# Patient Record
Sex: MALE | Race: WHITE | NOT HISPANIC OR LATINO | Employment: FULL TIME | URBAN - METROPOLITAN AREA
[De-identification: names, ages, dates, MRNs, and addresses within clinical notes are randomized per-mention and may not be internally consistent; named-entity substitution may affect disease eponyms.]

---

## 2017-05-04 ENCOUNTER — HISTORICAL (OUTPATIENT)
Dept: ADMINISTRATIVE | Facility: HOSPITAL | Age: 52
End: 2017-05-04

## 2019-05-28 ENCOUNTER — HISTORICAL (OUTPATIENT)
Dept: LAB | Facility: HOSPITAL | Age: 54
End: 2019-05-28

## 2019-11-13 ENCOUNTER — HISTORICAL (OUTPATIENT)
Dept: ADMINISTRATIVE | Facility: HOSPITAL | Age: 54
End: 2019-11-13

## 2019-11-13 LAB
ABS NEUT (OLG): 5.2 X10(3)/MCL (ref 2.1–9.2)
ALBUMIN SERPL-MCNC: 4.4 GM/DL (ref 3.4–5)
ALBUMIN/GLOB SERPL: 1.69 {RATIO} (ref 1.5–2.5)
ALP SERPL-CCNC: 43 UNIT/L (ref 38–126)
ALT SERPL-CCNC: 37 UNIT/L (ref 7–52)
APPEARANCE, UA: CLEAR
AST SERPL-CCNC: 36 UNIT/L (ref 15–37)
BACTERIA #/AREA URNS AUTO: NORMAL /HPF
BILIRUB SERPL-MCNC: 0.7 MG/DL (ref 0.2–1)
BILIRUB UR QL STRIP: NEGATIVE MG/DL
BILIRUBIN DIRECT+TOT PNL SERPL-MCNC: 0.1 MG/DL (ref 0–0.5)
BILIRUBIN DIRECT+TOT PNL SERPL-MCNC: 0.6 MG/DL
BUN SERPL-MCNC: 16 MG/DL (ref 7–18)
CALCIUM SERPL-MCNC: 9 MG/DL (ref 8.5–10)
CHLORIDE SERPL-SCNC: 99 MMOL/L (ref 98–107)
CHOLEST SERPL-MCNC: 232 MG/DL (ref 0–200)
CHOLEST/HDLC SERPL: 4.4 {RATIO}
CO2 SERPL-SCNC: 29 MMOL/L (ref 21–32)
COLOR UR: YELLOW
CREAT SERPL-MCNC: 1 MG/DL (ref 0.6–1.3)
ERYTHROCYTE [DISTWIDTH] IN BLOOD BY AUTOMATED COUNT: 12.1 % (ref 11.5–17)
GLOBULIN SER-MCNC: 2.6 GM/DL (ref 1.2–3)
GLUCOSE (UA): NEGATIVE MG/DL
GLUCOSE SERPL-MCNC: 109 MG/DL (ref 74–106)
HCT VFR BLD AUTO: 41.8 % (ref 42–52)
HDLC SERPL-MCNC: 53 MG/DL (ref 35–60)
HGB BLD-MCNC: 14.4 GM/DL (ref 14–18)
HGB UR QL STRIP: NEGATIVE UNIT/L
KETONES UR QL STRIP: NEGATIVE MG/DL
LDLC SERPL CALC-MCNC: 172 MG/DL (ref 0–129)
LEUKOCYTE ESTERASE UR QL STRIP: NEGATIVE UNIT/L
LYMPHOCYTES # BLD AUTO: 2.9 X10(3)/MCL (ref 0.6–3.4)
LYMPHOCYTES NFR BLD AUTO: 32.4 % (ref 13–40)
MCH RBC QN AUTO: 31.4 PG (ref 27–31.2)
MCHC RBC AUTO-ENTMCNC: 34 GM/DL (ref 32–36)
MCV RBC AUTO: 91 FL (ref 80–94)
MONOCYTES # BLD AUTO: 0.9 X10(3)/MCL (ref 0.1–1.3)
MONOCYTES NFR BLD AUTO: 9.5 % (ref 0.1–24)
NEUTROPHILS NFR BLD AUTO: 58.1 % (ref 47–80)
NITRITE UR QL STRIP.AUTO: NEGATIVE
PH UR STRIP: 5 [PH]
PLATELET # BLD AUTO: 273 X10(3)/MCL (ref 130–400)
PMV BLD AUTO: 8.7 FL (ref 9.4–12.4)
POTASSIUM SERPL-SCNC: 4 MMOL/L (ref 3.5–5.1)
PROT SERPL-MCNC: 7 GM/DL (ref 6.4–8.2)
PROT UR QL STRIP: NEGATIVE MG/DL
PSA SERPL-MCNC: 5.82 NG/ML (ref 0–3.5)
RBC # BLD AUTO: 4.58 X10(6)/MCL (ref 4.7–6.1)
RBC #/AREA URNS HPF: NORMAL /HPF
SODIUM SERPL-SCNC: 134 MMOL/L (ref 136–145)
SP GR UR STRIP: 1.02
SQUAMOUS EPITHELIAL, UA: NORMAL /LPF
TESTOST SERPL-MCNC: 311 NG/DL (ref 300–1060)
TRIGL SERPL-MCNC: 148 MG/DL (ref 30–150)
UROBILINOGEN UR STRIP-ACNC: 0.2 MG/DL
VLDLC SERPL CALC-MCNC: 29.6 MG/DL
WBC # SPEC AUTO: 9 X10(3)/MCL (ref 4.5–11.5)
WBC #/AREA URNS AUTO: NORMAL /[HPF]

## 2020-03-02 ENCOUNTER — HISTORICAL (OUTPATIENT)
Dept: ADMINISTRATIVE | Facility: HOSPITAL | Age: 55
End: 2020-03-02

## 2020-03-02 LAB
FLUAV AG NPH QL IA: POSITIVE
FLUBV AG NPH QL IA: NEGATIVE

## 2022-04-11 ENCOUNTER — HISTORICAL (OUTPATIENT)
Dept: ADMINISTRATIVE | Facility: HOSPITAL | Age: 57
End: 2022-04-11

## 2022-04-27 VITALS
DIASTOLIC BLOOD PRESSURE: 70 MMHG | HEIGHT: 74 IN | SYSTOLIC BLOOD PRESSURE: 120 MMHG | WEIGHT: 240.75 LBS | OXYGEN SATURATION: 95 % | BODY MASS INDEX: 30.9 KG/M2

## 2022-05-05 NOTE — HISTORICAL [facility]ER
This is a historical note converted from Sonia. Formatting and pictures may have been removed.  Please reference Sonia for original formatting and attached multimedia. Chief Complaint  Needs pcp  History of Present Illness  Patient presents to establish care in the internal medicine clinic.? He has a past medical history hypertension, tobacco use, and most recently,?left great toe amputation secondary to acute osteomyelitis?in March 2017.? Patients last surgery clinic appointment was April 11, 2017 at which time he was released to go back to work.? 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 However, he admits to a new?ulceration on his left?2nd toe now.? This 1st started about 2 weeks ago?and he was able to express a significant amount of?pus.? 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 He denies fever, chills, nausea, vomiting, headaches, diarrhea or abdominal pain. ?Has been using topical mupirocin?2% ointment.  Review of Systems  ????Constitutional: No fever, No chills, No weakness, No fatigue.  ?????Eye: No recent visual problem.  ?????Respiratory: No shortness of breath, No cough, No sputum production, No wheezing.  ?????Cardiovascular: No chest pain, No palpitations, No peripheral edema.  ?????Gastrointestinal: No nausea, No vomiting, No diarrhea, No constipation, No heartburn, No abdominal pain.  ?????Genitourinary: No dysuria.  ?????Endocrine: No excessive thirst, No polyuria, No cold intolerance, No heat intolerance.  ?????Musculoskeletal: No back pain, No joint pain, No decreased range of motion.  ?????Integumentary: No rash, No pruritus.? 2nd left toe ulcer.  ?????Neurologic: Alert and oriented X4, No numbness, No tingling, No headache.  ?????Psychiatric: No anxiety, No depression.  Physical Exam  Vitals & Measurements  T:?37.0? ?C ?(Oral)?  RR:?18? BP:?122/77? HT:?188?cm? HT:?188?cm? HT:?188?cm? WT:?94.0?kg? WT:?94.0?kg? WT:?94.0?kg? BMI:?26.6?  General: Alert and oriented, No acute distress.  ?????Eye: Pupils are equal, round and reactive to light, Extraocular movements are intact, Normal conjunctiva.  ?????HENT: Normocephalic, Oral mucosa is moist, No pharyngeal erythema.  ?????Neck: Supple, Non-tender.  ?????Respiratory: Lungs are clear to auscultation, Respirations are non-labored.  ?????Cardiovascular: Normal rate, Regular rhythm, No murmur, Good pulses equal in all extremities, No edema.  ?????Gastrointestinal: Soft, Non-tender, Non-distended, Normal bowel sounds.  ?????Musculoskeletal: Normal range of motion, Normal strength.  ?????Integumentary: Warm, Dry.? Well-healed left great toe amputation site.? 2nd?left?toe?reveals edema, erythema,?1 cm x 1 cm?ulceration?distally?with underlying?yellow?material, but no?discharge could be expressed?on manual?palpation.  ?????Neurologic: Alert, Oriented.  ?????Cognition and Speech: Oriented, Speech clear and coherent.  ?????Psychiatric: Cooperative, Appropriate mood & affect.  Assessment/Plan  Cellulitis of second toe of left foot  Ordered:  clindamycin, 300 mg = 1 cap(s), Oral, q6hr, X 10 day(s), # 40 cap(s), 0 Refill(s), Pharmacy: [facility] [place], [place]  XR Foot Second Digit Left Min 2 Views, Routine, *Est. 05/04/17 3:00:00 CDT, Inflammation, Left second toe cellulitis, Hx of L great toe osteomyelitis, None, Wheelchair, Patient Has IV?, Rad Type, Order for future visit, Cellulitis of second toe of left foot, On Exactly, *Est. 05/04/17 3:00...  ?  Orders:  lisinopril, 10 mg = 1 tab(s), Oral, Daily, # 30 tab(s), 3 Refill(s), [facility] Follow up, *Est. 06/29/17 3:00:00 CDT, Order for future visit, Wellness examination  Hypertension, [facility]  Lipid Panel, Routine collect, *Est. 06/22/17 3:00:00 CDT, Blood, Order for future visit, *Est. Stop date  06/22/17 3:00:00 CDT, Lab Collect, Wellness examination  Hypertension, 7, week(s), In Approximately, 05/04/17 8:14:00 CDT  Occult Blood Stool #2 Screening, Routine collect, *Est. 06/22/17 3:00:00 CDT, Stool, Order for future visit, *Est. Stop date 06/22/17 3:00:00 CDT, Nurse collect, Colon cancer screening, day(s), 7, week(s), In Approximately, 05/04/17 8:14:00 CDT  Occult Blood Stool #3 Screening, Routine collect, *Est. 06/22/17 3:00:00 CDT, Stool, Order for future visit, *Est. Stop date 06/22/17 3:00:00 CDT, Nurse collect, Colon cancer screening, day(s), 7, week(s), In Approximately, 05/04/17 8:14:00 CDT  Occult Blood Stool Screening Test, Routine collect, *Est. 06/22/17 3:00:00 CDT, Stool, Order for future visit, *Est. Stop date 06/22/17 3:00:00 CDT, Nurse collect, Colon cancer screening, day(s), 7, week(s), In Approximately, 05/04/17 8:14:00 CDT  ?  1. ?Hypertension: Well controlled on lisinopril?10 mg by mouth daily, no changes today.  2.? Recent history of?left great toe amputation:?Patient has been released to go back to work by surgeons.  3. ?Wellness: Labs as above.  4. ?Tobacco use: Strongly advised on cessation.  5. 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 X-ray today?to rule out?underlying osteomyelitis. ?Start oral antibiotic?as above,?clindamycin 300 mg by mouth 4 times a day??10 days.  ?  Screening:?FOBT ?3.  ?  Patient voiced understanding.   Problem List/Past Medical History  Essential hypertension  High blood pressure  Neuropathy of lower limb  Tobacco user  Tobacco user  Historical  No historical problems  Procedure/Surgical History  Amputation Toe (Left) (03/13/2017), Detachment at Left 1st Toe, Complete, Open Approach (03/13/2017), herniated disc repaired (01/01/2015), left femur break (01/01/2008), colostomy (1981), sciatic nerve repair (01/01/1980), surgery to left  leg (1980).  Medications  lisinopril 10 mg oral tablet, 10 mg, 1 tab(s), Oral, Daily, 3 refills  MELOXICAM TAB 7.5MG, 7.5 mg, 1 tab(s), Oral, BID  mupirocin 2% topical ointment, 1 adolfo, TOP, TID,? ?Not Taking, Completed Rx  Allergies  No Known Allergies  Social History  Alcohol  Current, Beer, 1-2 times per week  Employment/School  Employed, Highest education level: Some college.  Home/Environment  Lives with Alone. Living situation: Home/Independent. Alcohol abuse in household: No. Substance abuse in household: No. Smoker in household: No. Feels unsafe at home: No. Safe place to go: Yes. Family/Friends available for support: Yes. Concern for family members at home: No. Major illness in household: No.  Nutrition/Health  Regular, Caffeine intake amount: Coffee. Wants to lose weight: No. Sleeping concerns: No. Feels highly stressed: No.  Substance Abuse  Never  Tobacco  Current some day smoker, Cigarettes, 1 per day. Started age 18.0 Years. Ready to change: Yes.  Family History  : Mother and Father.  Health Maintenance  Colonoscopy: UTD at age of 49, within normal limits per patient.      Adding Cipro 500mg PO BID x10 days. Will be seen in wound care clinic today at 2:30pm.

## 2022-05-05 NOTE — HISTORICAL [facility] CERNER
This is a historical note converted from Sonia. Formatting and pictures may have been removed.  Please reference Sonia for original formatting and attached multimedia. Chief Complaint  neuropathic foot wound  History of Present Illness  51M with LLE neuropathy from nerve injury as a child. Had L great toe infection that required amputation 2 months ago. Now has callus and ulcer of 2nd toe. No fevers. Seen in medicine clinic, given clinda rx and sent  Physical Exam  Vitals & Measurements  T:?36.6? ?C ?(Oral)? RR:?20? BP:?139/92? WT:?94.6?kg? WT:?94.0?kg? WT:?94.6?kg?  NAD  reg pulse  resp nonlabored  LLE with calf atrophy, decreased hair, 2+ DP/PT pulses, minimal sensation  1st toe stump well healed  2nd toe erythema with callus at tip, and toe enlargement  ?  Callus and nail removed  underlying ulcer with fibrinous exudate  1x1.4 cm with yellow base  Assessment/Plan  51M with neuropathy and 2nd L toe ulcer  - callus and nail removed  - pt to start abx today  - advised to wear off-loading shoe and daily foot checks  - wound care- silver alginate  - return to wound care clinic next week  ?  Tiffanie Shah, PGY3   Problem List/Past Medical History  Essential hypertension  High blood pressure  Neuropathy of lower limb  Tobacco user  Tobacco user  Tobacco user  Historical  No historical problems  Procedure/Surgical History  Amputation Toe (Left) (03/13/2017), Detachment at Left 1st Toe, Complete, Open Approach (03/13/2017), herniated disc repaired (01/01/2015), left femur break (01/01/2008), colostomy (1981), sciatic nerve repair (01/01/1980), surgery to left leg (01/01/1980).  Medications  Inpatient  No active inpatient medications  Home  Cipro 500 mg oral tablet, 500 mg, 1 tab(s), Oral, q12hr  clindamycin 300 mg oral capsule, 300 mg, 1 cap(s), Oral, q6hr  lisinopril 10 mg oral tablet, 10 mg, 1 tab(s), Oral, Daily, 3 refills  MELOXICAM TAB 7.5MG, 7.5 mg, 1 tab(s), Oral, BID  mupirocin 2% topical ointment, 1 adolfo,  TOP, TID,? ?Not Taking, Completed Rx  Allergies  No Known Allergies  Social History  Alcohol  Current, Beer, 1-2 times per week  Employment/School  Employed, Highest education level: Some college.  Home/Environment  Lives with Alone. Living situation: Home/Independent. Alcohol abuse in household: No. Substance abuse in household: No. Smoker in household: No. Feels unsafe at home: No. Safe place to go: Yes. Family/Friends available for support: Yes. Concern for family members at home: No. Major illness in household: No.  Nutrition/Health  Regular, Caffeine intake amount: Coffee. Wants to lose weight: No. Sleeping concerns: No. Feels highly stressed: No.  Substance Abuse  Never  Tobacco  Current some day smoker, Cigarettes, 1 per day. Started age 18.0 Years. Ready to change: Yes.  Family History  : Mother and Father.

## 2022-05-05 NOTE — HISTORICAL [facility] CERNER
This is a historical note converted from Sonia. Formatting and pictures may have been removed.  Please reference Sonia for original formatting and attached multimedia. Chief Complaint  + Flu; Fell @ home while getting out of the bathtub- left side rib pain.  History of Present Illness  Pt fell and hit his left side on the tub last Thursday. He is having?a good bit of pain, especially with coughing, sneezing and movement.  Review of Systems  He feels like he has the flu. + chills. + emesis x 4. + Body aches. + malaise. + rhinorrhea. + cough. He woke up  morning and felt terrible. He started Theraflu.  Physical Exam  Vitals & Measurements  T:?36.8? ?C (Oral)? HR:?120(Peripheral)? BP:?100/70?  HT:?185?cm? WT:?110?kg? BMI:?32.14?  Gen: well developed, well nourished malewho is appears to be in mild distress.  Head/Neck:? normocephalic, atraumatic, supple,-adenopathy,?-stiffness.?  Mouth/Oropharynx:?-normal dentition,?-tongue abnormalities,-mucosal lesions,? + erythema,-exudate,?-edema of posterior pharynx,-edema of tonsils,??  -?tonsillar exudate,-postnasal drainage,-cobblestoning.  Nares:?+erythema,-edema,-discharge: rhinorrhea.  CV:? regular without murmurs, rubs, gallops.?  Chest:?clearto auscultation bilaterally,?-wheezing,-crackles,-rhonchi.??It is uncomfortable to patient to take a deep breath.  Chest Wall: he has an area of significant?tenderness along his left?inferior lateral rib cage, ecchymosis noted. No palpable deformities.  ?  Assessment/Plan  1.?Contusion of rib on left side?S20.212A  He has a rib contusion on his left side secondary to fall.  X-rays pending.  Norco 7.5 m tablet every 6 hours as needed for pain.  May take Tramadol for less severe pain; refills sent.  Ordered:  Office/Outpatient Visit Level 4 Established [id number] PC, Contusion of rib on left side  Flu, [facility], 20 8:59:00 CST  ?  2.?Flu?J11.1  Tamiflu 50 mg twice daily x5 days.  Multisymptom relief  product.  Backslash fluids.  ?  Ordered:  Office/Outpatient Visit Level 4 Established [id number] PC, Contusion of rib on left side  Flu, [facility], 03/02/20 8:59:00 CST  ?  Orders:  acetaminophen-HYDROcodone, 1 tab(s), Oral, q6hr, # 20 tab(s), 0 Refill(s), [facility] [place], 185, cm, Height/Length Dosing, 03/02/20 8:02:00 CST, 110, kg, Weight Dosing, 03/02/20 8:02:00 CST  oseltamivir, 75 mg = 1 cap(s), Oral, BID, X 5 day(s), # 10 cap(s), 0 Refill(s), [facility] [place], 185, cm, Height/Length Dosing, 03/02/20 8:02:00 CST, 110, kg, Weight Dosing, 03/02/20 8:02:00 CST  traMADol, See Instructions, TAKE ONE TABLET TWICE A DAY, # 60 tab(s), 1 Refill(s), [facility] [place], 185, cm, Height/Length Dosing, 03/02/20 8:02:00 CST, 110, kg, Weight Dosing, 03/02/20 8:02:00 CST  Clinic Follow-up PRN, 03/02/20 8:59:00 CST, [facility], Future Order  XR Ribs Left W PA Chest, Routine, 03/02/20 8:10:00 CST, Follow Up Trauma, None, Ambulatory, Patient Has IV?, Rad Type, Rib pain, [facility], 03/02/20 8:10:00 CST  Referrals  Clinic Follow-up PRN, 03/02/20 8:59:00 CST, [facility], Future Order   Problem List/Past Medical History  Ongoing  Essential hypertension  Neuropathy of lower limb  Obesity  Historical  High blood pressure  Tobacco user  Procedure/Surgical History  Amputation Toe (Left) (03/13/2017)  Detachment at Left 1st Toe, Complete, Open Approach (03/13/2017)  herniated disc repaired (01/01/2015)  left femur break (01/01/2008)  colostomy (1981)  sciatic nerve repair (01/01/1980)  surgery to left leg (01/01/1980)   Medications  lisinopril 10 mg oral tablet, 10 mg= 1 tab(s), Oral, Daily, 5 refills  Norco 7.5 mg-325 mg oral tablet, 1 tab(s), Oral, q6hr  sildenafil 20 mg oral tablet, See Instructions  Tamiflu 75 mg oral capsule, 75 mg= 1 cap(s), Oral, BID  traMADol 50 mg oral tablet, See Instructions, 1 refills  Allergies  No Known  Allergies  Social History  Abuse/Neglect  No, 2020  Alcohol  Current, Beer, 1-2 times per week, 2017  Employment/School  Employed, Work/School description: [employer]., 2019  Employed, Highest education level: Some college., 2017  Exercise  Exercise frequency: 3-4 times/week. Exercise type: Swimming, Weight lifting, BIKE RIDING., 2019  Home/Environment  Lives with Alone. Living situation: Home/Independent. Alcohol abuse in household: No. Substance abuse in household: No. Smoker in household: No. Feels unsafe at home: No. Safe place to go: Yes. Family/Friends available for support: Yes. Concern for family members at home: No. Major illness in household: No., 2017  Nutrition/Health  Regular, Good, 2019  Substance Use  Never, 2017  Tobacco  4 or less cigarettes(less than 1/4 pack)/day in last 30 days, Cigarettes, No, 1 per day. 18 Years (Age started)., 2020  Current some day smoker, Cigarettes, 1 per day. Started age 18.0 Years. Ready to change: Yes., 2017  Family History  : Mother and Father.  Immunizations  Vaccine Date Status   tetanus/diphtheria/pertussis, acel(Tdap) 2019 Given   tetanus/diphtheria/pertussis, acel(Tdap) 2019 Given   influenza virus vaccine, inactivated 2015 Recorded   Health Maintenance  Health Maintenance  ???Pending?(in the next year)  ??? ??OverDue  ??? ? ? ?Diabetes Screening due??and every?  ??? ? ? ?ADL Screening due??18??and every 1??year(s)  ??? ? ? ?Depression Screening due??08/10/18??and every 1??year(s)  ??? ? ? ?Alcohol Misuse Screening due??20??and every 1??year(s)  ??? ??Due?  ??? ? ? ?Aspirin Therapy for CVD Prevention due??20??and every 1??year(s)  ??? ? ? ?Colorectal Screening due??20??and every?  ??? ? ? ?Hypertension Management-Education due??20??and every 1??year(s)  ??? ??Due In Future?  ??? ? ? ?Hypertension Management-BMP not due  until??11/12/20??and every 1??year(s)  ??? ? ? ?Obesity Screening not due until??01/01/21??and every 1??year(s)  ???Satisfied?(in the past 1 year)  ??? ??Satisfied?  ??? ? ? ?Blood Pressure Screening on??03/02/20.??Satisfied by Aida Jasmine LPN  ??? ? ? ?Body Mass Index Check on??03/02/20.??Satisfied by Aida Jasmine LPN  ??? ? ? ?Diabetes Screening on??11/13/19.??Satisfied by Jarad Menchaca  ??? ? ? ?Hypertension Management-Blood Pressure on??03/02/20.??Satisfied by Aida Jasmine LPN  ??? ? ? ?Lipid Screening on??11/13/19.??Satisfied by Jarad Menchaca  ??? ? ? ?Obesity Screening on??03/02/20.??Satisfied by Aida Jasmine LPN  ??? ? ? ?Tetanus Vaccine on??11/13/19.??Satisfied by Aida Jasmine LPN  ?  Lab Results  Test Name Test Result Date/Time   Influ A Ag Positive (Abnormal) 03/02/2020 08:34 CST   Influ B Ag Negative 03/02/2020 08:34 CST       X-rays?reveal nondisplaced?fractures?of the tips of the left eighth and ninth ribs.

## 2022-07-15 PROBLEM — M25.552 BILATERAL HIP PAIN: Status: ACTIVE | Noted: 2022-07-15

## 2022-07-15 PROBLEM — G89.29 CHRONIC MIDLINE LOW BACK PAIN WITHOUT SCIATICA: Status: ACTIVE | Noted: 2022-07-15

## 2022-07-15 PROBLEM — M54.50 CHRONIC MIDLINE LOW BACK PAIN WITHOUT SCIATICA: Status: ACTIVE | Noted: 2022-07-15

## 2022-07-15 PROBLEM — M25.551 BILATERAL HIP PAIN: Status: ACTIVE | Noted: 2022-07-15

## 2022-11-03 PROBLEM — Z00.00 ENCOUNTER FOR WELLNESS EXAMINATION IN ADULT: Status: ACTIVE | Noted: 2022-11-03

## 2022-11-03 PROBLEM — Z23 IMMUNIZATION DUE: Status: ACTIVE | Noted: 2022-11-03

## 2022-11-03 PROCEDURE — 84100 ASSAY OF PHOSPHORUS: CPT | Performed by: FAMILY MEDICINE

## 2022-11-03 PROCEDURE — 85651 RBC SED RATE NONAUTOMATED: CPT | Performed by: FAMILY MEDICINE

## 2022-11-03 PROCEDURE — 86141 C-REACTIVE PROTEIN HS: CPT | Performed by: FAMILY MEDICINE

## 2023-01-08 PROBLEM — Z01.818 PRE-OP EXAMINATION: Status: ACTIVE | Noted: 2023-01-08

## 2023-01-09 PROBLEM — M16.0 PRIMARY OSTEOARTHRITIS OF BOTH HIPS: Status: ACTIVE | Noted: 2023-01-09

## 2023-01-09 PROBLEM — R73.9 HYPERGLYCEMIA: Status: ACTIVE | Noted: 2023-01-09

## 2023-02-06 PROBLEM — Z00.00 ENCOUNTER FOR WELLNESS EXAMINATION IN ADULT: Status: RESOLVED | Noted: 2022-11-03 | Resolved: 2023-02-06

## 2023-02-12 PROBLEM — Z09 HOSPITAL DISCHARGE FOLLOW-UP: Status: ACTIVE | Noted: 2023-02-12

## 2023-02-14 PROBLEM — Z96.642 HISTORY OF TOTAL HIP REPLACEMENT, LEFT: Status: ACTIVE | Noted: 2023-02-14

## 2023-04-10 PROBLEM — E83.52 HYPERCALCEMIA: Status: ACTIVE | Noted: 2023-04-10

## 2023-04-10 PROBLEM — R60.0 EDEMA OF LEFT LOWER EXTREMITY: Status: ACTIVE | Noted: 2023-04-10

## 2023-05-22 PROBLEM — Z09 HOSPITAL DISCHARGE FOLLOW-UP: Status: RESOLVED | Noted: 2023-02-12 | Resolved: 2023-05-22

## 2023-10-03 PROBLEM — M16.11 PRIMARY OSTEOARTHRITIS OF RIGHT HIP: Status: ACTIVE | Noted: 2023-10-03

## 2024-03-04 ENCOUNTER — HOSPITAL ENCOUNTER (EMERGENCY)
Facility: HOSPITAL | Age: 59
Discharge: HOME | End: 2024-03-04
Attending: INTERNAL MEDICINE
Payer: COMMERCIAL

## 2024-03-04 VITALS
BODY MASS INDEX: 33.13 KG/M2 | HEART RATE: 88 BPM | OXYGEN SATURATION: 99 % | WEIGHT: 250 LBS | DIASTOLIC BLOOD PRESSURE: 97 MMHG | RESPIRATION RATE: 20 BRPM | SYSTOLIC BLOOD PRESSURE: 151 MMHG | TEMPERATURE: 97 F | HEIGHT: 73 IN

## 2024-03-04 DIAGNOSIS — J06.9 VIRAL URI WITH COUGH: Primary | ICD-10-CM

## 2024-03-04 DIAGNOSIS — R03.0 ELEVATED BLOOD PRESSURE READING: ICD-10-CM

## 2024-03-04 LAB
FLUAV AG UPPER RESP QL IA.RAPID: NOT DETECTED
FLUBV AG UPPER RESP QL IA.RAPID: NOT DETECTED
SARS-COV-2 RNA RESP QL NAA+PROBE: NOT DETECTED

## 2024-03-04 PROCEDURE — 99284 EMERGENCY DEPT VISIT MOD MDM: CPT | Mod: 25

## 2024-03-04 PROCEDURE — 0240U COVID/FLU A&B PCR: CPT | Performed by: INTERNAL MEDICINE

## 2024-03-04 PROCEDURE — 93005 ELECTROCARDIOGRAM TRACING: CPT

## 2024-03-04 NOTE — ED PROVIDER NOTES
Source of History:  Patient, no limitations    Chief complaint:  Cough (Pt complaint of cough and sinus congestion)      HPI:  Kamaljit Frias is a 58 y.o. male presenting with Cough (Pt complaint of cough and sinus congestion)       Only wants COVID testing, refusing treatment  Patient presents for evaluation of congestion, sore throat. Onset of symptoms was 1 week ago, and has been gradually improving since that time. Symptoms include congestion. The symptoms are worse with change in weather and cold symptoms. The patient denies complaints of fever. The patient has a past medical history of No pertinent additional PMH. Fluid intake has been good. Care prior to arrival consisted of  None      Review of Systems   Constitutional symptoms:  Negative except as documented in HPI.   Skin symptoms:  Negative except as documented in HPI.   HEENT symptoms:  Negative except as documented in HPI.   Respiratory symptoms:  Negative except as documented in HPI.   Cardiovascular symptoms:  Negative except as documented in HPI.   Gastrointestinal symptoms:  Negative except as documented in HPI.    Genitourinary symptoms:  Negative except as documented in HPI.   Musculoskeletal symptoms:  Negative except as documented in HPI.   Neurologic symptoms:  Negative except as documented in HPI.   Psychiatric symptoms:  Negative except as documented in HPI.   Allergy/immunologic symptoms:  Negative except as documented in HPI.             Additional review of systems information: All other systems reviewed and otherwise negative.      Review of patient's allergies indicates:  No Known Allergies    PMH:  As per HPI and below:    Past Medical History:   Diagnosis Date    Essential (primary) hypertension     Neuropathy of lower extremity         Family History   Problem Relation Age of Onset    No Known Problems Mother     Stroke Father     Fibromyalgia Sister     No Known Problems Sister        Past Surgical History:   Procedure  "Laterality Date    COLOSTOMY  1981    Detachment at Left 1st Toe, Complete, Open Approach Left 03/13/2017    herniated disc repaired   2015    left femur break   2008    left hip replacement  02/02/2023    sciatic nerve repair  Left 1980       Social History     Tobacco Use    Smoking status: Former    Smokeless tobacco: Never    Tobacco comments:     Age started: 18; Age stopped: 54   Substance Use Topics    Alcohol use: Yes     Comment: 1-2 times per week    Drug use: Never       Patient Active Problem List   Diagnosis    Essential (primary) hypertension    Chronic midline low back pain without sciatica    Neuropathy of lower extremity    Immunization due    Pre-op examination    Primary osteoarthritis of both hips    Hyperglycemia    History of total hip replacement, left    Edema of left lower extremity    Hypercalcemia    Primary osteoarthritis of right hip        Physical Exam:    BP (!) 151/97   Pulse 88   Temp 97 °F (36.1 °C) (Oral)   Resp 20   Ht 6' 1" (1.854 m)   Wt 113.4 kg (250 lb)   SpO2 99%   BMI 32.98 kg/m²     Nursing note and vital signs reviewed.    General:  Alert, no acute distress.   Skin: Normal for Ethnic Origin, No cyanosis  HEENT: Normocephalic and atraumatic, Vision unchanged, Pupils symmetric, No icterus , Nasal mucosa is pink and moist  Cardiovascular:  Regular rate and rhythm, No edema  Chest Wall: No deformity, equal chest rise  Respiratory:  Lungs are clear to auscultation, respirations are non-labored.    Musculoskeletal:  No deformity, Normal perfusion to all extremities  Gastrointestinal:  Soft, Non distended  Neurological:  Alert and oriented, normal motor observed, normal speech observed.    Psychiatric:  Cooperative, appropriate mood & affect.        Labs that have been ordered have been independently reviewed and interpreted by myself.     Old Chart Reviewed.      Initial Impression/ Differential Dx:  Viral upper respiratory infection, sinusitis, allergic rhinitis, " bronchitis, influenza, pneumonia, dental infection, pharyngitis       MDM:      Reviewed Nurses Note.    Reviewed Pertinent old records.    Orders Placed This Encounter    X-Ray Chest PA And Lateral    COVID/FLU A&B PCR    EKG 12-lead                    Labs Reviewed   COVID/FLU A&B PCR - Normal    Narrative:     The Xpert Xpress SARS-CoV-2/FLU/RSV plus is a rapid, multiplexed real-time PCR test intended for the simultaneous qualitative detection and differentiation of SARS-CoV-2, Influenza A, Influenza B, and respiratory syncytial virus (RSV) viral RNA in either nasopharyngeal swab or nasal swab specimens.                X-Ray Chest PA And Lateral   Final Result      No acute chest disease is identified.         Electronically signed by: Eleazar Davis   Date:    03/04/2024   Time:    08:56           Admission on 03/04/2024, Discharged on 03/04/2024   Component Date Value Ref Range Status    Influenza A PCR 03/04/2024 Not Detected  Not Detected Final    Influenza B PCR 03/04/2024 Not Detected  Not Detected Final    SARS-CoV-2 PCR 03/04/2024 Not Detected  Not Detected, Negative Final       Imaging Results              X-Ray Chest PA And Lateral (Final result)  Result time 03/04/24 08:56:25      Final result by Eleazar Davis MD (03/04/24 08:56:25)                   Impression:      No acute chest disease is identified.      Electronically signed by: Eleazar Davis  Date:    03/04/2024  Time:    08:56               Narrative:    EXAMINATION:  XR CHEST PA AND LATERAL    CLINICAL HISTORY:  cough;, .    COMPARISON:  March 2, 2020    FINDINGS:  No alveolar consolidation, effusion, or pneumothorax is seen.   The thoracic aorta is normal  cardiac silhouette, central pulmonary vessels and mediastinum are normal in size and are grossly unremarkable.   visualized osseous structures are grossly unremarkable.                                        ECG Results              EKG 12-lead (Preliminary result)  Result time  03/04/24 08:50:06      Wet Read by Pedro Ayala DO (03/04/24 08:50:06, [facility], Emergency Medicine)    Independent ECG Interpretation:    Normal sinus rhythm at rate of 90. Normal intervals. Normal QRS. No acute ST or T wave abnormalities. Overall impression: No evidence of acute ischemia or arrhythmia.                                                                      Diagnostic Impression:    1. Viral URI with cough    2. Elevated blood pressure reading         ED Disposition Condition    Discharge Stable             Follow-up Information       [facility]    Specialty: Emergency Medicine  Why: If symptoms worsen  Contact information:  [address]  [facility] [id number] [phone]                            ED Prescriptions    None       Follow-up Information       Follow up With Specialties Details Why Contact Info    [facility] Emergency Medicine  If symptoms worsen [address]  [facility] [id number] [phone]             Pedro Ayala DO  03/04/24 1301

## 2024-03-05 LAB
OHS QRS DURATION: 86 MS
OHS QTC CALCULATION: 430 MS

## 2024-04-04 ENCOUNTER — HOSPITAL ENCOUNTER (EMERGENCY)
Facility: HOSPITAL | Age: 59
Discharge: HOME | End: 2024-04-04
Attending: EMERGENCY MEDICINE
Payer: COMMERCIAL

## 2024-04-04 VITALS
HEART RATE: 104 BPM | SYSTOLIC BLOOD PRESSURE: 140 MMHG | WEIGHT: 250 LBS | RESPIRATION RATE: 20 BRPM | DIASTOLIC BLOOD PRESSURE: 99 MMHG | HEIGHT: 73 IN | TEMPERATURE: 99 F | OXYGEN SATURATION: 96 % | BODY MASS INDEX: 33.13 KG/M2

## 2024-04-04 DIAGNOSIS — M79.672 FOOT PAIN, LEFT: Primary | ICD-10-CM

## 2024-04-04 PROCEDURE — 99282 EMERGENCY DEPT VISIT SF MDM: CPT

## 2024-04-04 PROCEDURE — 25000003 PHARM REV CODE 250: Performed by: EMERGENCY MEDICINE

## 2024-04-04 RX ADMIN — BACITRACIN ZINC, NEOMYCIN SULFATE, AND POLYMYXIN B SULFATE: 400; 3.5; 5 OINTMENT TOPICAL at 02:04

## 2024-04-04 NOTE — ED PROVIDER NOTES
Encounter Date: 4/4/2024       History     Chief Complaint   Patient presents with    Foot Pain     Reports of left foot pain/wound that he first noticed about 3-4 days ago. Patient states when he was taking a shower he thinks it was a blister that popped and he noticed blood. Denies hx of diabetes. Pt states he is living in his car and his feet are always hanging downward so he thinks it may be from the pressure placed on the foot.      58-year-old male comes to emergency room stating that he has been taking a shower at [facility] because he is living in his car.  He states he is on diuretics for lower extremity edema, and when he stepped on a towel at [facility] he felt some pain in the noticed some skin that it come off of the heel of his foot.  He states it hurts to put pressure on this area.  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      Review of patient's allergies indicates:  No Known Allergies  Past Medical History:   Diagnosis Date    Essential (primary) hypertension     Neuropathy of lower extremity      Past Surgical History:   Procedure Laterality Date    COLOSTOMY  1981    Detachment at Left 1st Toe, Complete, Open Approach Left 03/13/2017    herniated disc repaired   2015    left femur break   2008    left hip replacement  02/02/2023    sciatic nerve repair  Left 1980     Family History   Problem Relation Age of Onset    No Known Problems Mother     Stroke Father     Fibromyalgia Sister     No Known Problems Sister      Social History     Tobacco Use    Smoking status: Former    Smokeless tobacco: Never    Tobacco comments:     Age started: 18; Age stopped: 54   Substance Use Topics    Alcohol use: Yes     Comment: 1-2 times per week    Drug use: Never     Review of Systems   All other systems reviewed and are negative.      Physical Exam     Initial Vitals [04/04/24 1238]   BP Pulse Resp Temp SpO2   (!)  140/99 104 20 98.8 °F (37.1 °C) 96 %      MAP       --         Physical Exam    Nursing note and vitals reviewed.  Constitutional: He appears well-developed.   HENT:   Head: Normocephalic.   Eyes: Conjunctivae are normal.   Cardiovascular:  Normal rate, regular rhythm and normal heart sounds.           Pulmonary/Chest: Breath sounds normal.   Abdominal: Abdomen is soft. Bowel sounds are normal. He exhibits no distension. There is no abdominal tenderness.   Musculoskeletal:         General: No edema.      Comments: The heel of the left foot has a 7 cm area consistent with a denuded blister.  The underlying tissue shows no signs of infection.  There is mild tenderness to this area.  There is no ecchymosis, and he is neurovascularly intact     Lymphadenopathy:     He has no cervical adenopathy.   Neurological: He is alert.   Skin: Skin is warm.   Psychiatric: He has a normal mood and affect.         ED Course   Procedures  Labs Reviewed - No data to display       Imaging Results    None          Medications - No data to display  Medical Decision Making  Medical Decision Making  Problem list/ differential diagnosis including but not limited to:  Infection, ischemia, trauma     Patient's chronic illnesses impacting care:  chronic edema        Diagnostic test considered but not ordered: none     Radiology reports  na     Discussion of case with external qualified healthcare professionals:  Not applicable        Review of external notes( inpt, ems, NH, clinic): in epic        Decision rules/scores:     Medications reviewed: all  Medications ordered in the ER: neosporin  Discharge prescriptions: none     Social variables possible impacting patient's healthcare: none     Code status/discussion     Shared decision making:     Consideration for admission versus discharge: stable for discharge      Amount and/or Complexity of Data Reviewed  Meds, old records           Amount and/or Complexity of Data Reviewed  External Data  Reviewed: notes.                                      Clinical Impression:  Final diagnoses:  [M79.672] Foot pain, left - secondary to ruptured blister (Primary)          ED Disposition Condition    Discharge Stable          ED Prescriptions    None       Follow-up Information       Follow up With Specialties Details Why Contact Info    Jian Walter MD Family Medicine In 2 days  [address]  [facility] [address]  [phone]               Paresh Blackwell Jr., MD  04/04/24 8585

## 2024-04-07 NOTE — PROGRESS NOTES
"left foot blister and Edema (X 1 week)       HPI:    Pt has been living in his car for a month. He lost his roommate and he has been working much. His left foot has been swollen x 3 weeks. His foot has gotten red starting 2 days ago. He has had drainage x 1 week from his left heel after blister develop.  He went to the ER at [facility] 1 week ago and had foot cleaned up.  He was given bandages to dress his heel.  No medications were prescribed; no evidence of infection at that time.  Patient has been able to shower once a week.  Patient with history of neuropathy; he has significantly decreased sensation of this foot.  He has a history of left great toe amputation.      Current Outpatient Medications   Medication Instructions    lisinopriL (PRINIVIL,ZESTRIL) 20 mg, Oral    tadalafiL (CIALIS) 20 mg, Oral, Daily         ROS:    See HPI.      PE:    ..Visit Vitals  /74   Pulse 95   Temp 99.1 °F (37.3 °C)   Ht 6' 1" (1.854 m)   Wt 123.8 kg (273 lb)   SpO2 (!) 93%   BMI 36.02 kg/m²        General:  He is well-developed well-nourished obese white male in no apparent distress.  He is alert and oriented.  He interacts appropriately.    Left lower extremity:  Patient has difficulty with getting shoe off secondary to swelling.  He has a sock which is wet with drainage.  Foot is diffusely swollen with cellulitis noted.  There is a large denuded area over his heel.  There is adjacent maceration of margins noted there is foul-smelling drainage.        1. Ulcer of left foot, unspecified ulcer stage  Overview:  Patient with denuded area to left heel; margins with maceration and adjacent cellulitis.  There is foul-smelling drainage noted.  He has cellulitis extending from foot to distal calf region.  Will send to ER to be admitted.      2. Cellulitis of left lower extremity  Overview:  Patient with erythema, pain and swelling to left lower extremity from foot to distal calf.  Patient has had foot swelling for weeks " secondary to living in car.  He subsequently developed a blister on his heel which ruptured.  He now has a denuded area over his left heel adjacent macerated skin and cellulitis.  There is foul-smelling drainage dressing.  Will send patient to hospital to be admitted for further evaluation and wound management.  Patient will need IV antibiotics and wound care.      3. Edema of left lower extremity  Overview:  Patient advised to take Lasix as needed or every other day to control edema.    07/10/2023:  He has not had any issues with lower extremity edema; he does not recall the last time he took Lasix.    04/10/2024: Patient with marked edema to left lower extremity for week secondary to living in automobile.  Patient has 3+ pitting edema from his foot to his proximal shin on examination.                ..Follow up if symptoms worsen or fail to improve.       No future appointments.

## 2024-04-10 ENCOUNTER — OFFICE VISIT (OUTPATIENT)
Dept: PRIMARY CARE CLINIC | Facility: CLINIC | Age: 59
End: 2024-04-10
Payer: COMMERCIAL

## 2024-04-10 ENCOUNTER — HOSPITAL ENCOUNTER (INPATIENT)
Facility: HOSPITAL | Age: 59
LOS: 4 days | Discharge: HOME | DRG: 603 | End: 2024-04-15
Attending: EMERGENCY MEDICINE | Admitting: INTERNAL MEDICINE
Payer: COMMERCIAL

## 2024-04-10 VITALS
BODY MASS INDEX: 36.18 KG/M2 | HEART RATE: 95 BPM | DIASTOLIC BLOOD PRESSURE: 74 MMHG | HEIGHT: 73 IN | WEIGHT: 273 LBS | TEMPERATURE: 99 F | OXYGEN SATURATION: 93 % | SYSTOLIC BLOOD PRESSURE: 132 MMHG

## 2024-04-10 DIAGNOSIS — R60.0 EDEMA OF LEFT LOWER EXTREMITY: ICD-10-CM

## 2024-04-10 DIAGNOSIS — T14.8XXA WOUND INFECTION: ICD-10-CM

## 2024-04-10 DIAGNOSIS — L97.529 ULCER OF LEFT FOOT, UNSPECIFIED ULCER STAGE: ICD-10-CM

## 2024-04-10 DIAGNOSIS — L98.499 INFECTED ULCER OF SKIN, UNSPECIFIED ULCER STAGE: ICD-10-CM

## 2024-04-10 DIAGNOSIS — L97.529 ULCER OF LEFT FOOT, UNSPECIFIED ULCER STAGE: Primary | ICD-10-CM

## 2024-04-10 DIAGNOSIS — L03.818 CELLULITIS OF OTHER SPECIFIED SITE: Primary | ICD-10-CM

## 2024-04-10 DIAGNOSIS — L08.9 WOUND INFECTION: ICD-10-CM

## 2024-04-10 DIAGNOSIS — L08.9 INFECTED ULCER OF SKIN, UNSPECIFIED ULCER STAGE: ICD-10-CM

## 2024-04-10 DIAGNOSIS — L03.90 CELLULITIS, UNSPECIFIED CELLULITIS SITE: ICD-10-CM

## 2024-04-10 DIAGNOSIS — M79.89 LEFT LEG SWELLING: ICD-10-CM

## 2024-04-10 DIAGNOSIS — L03.116 CELLULITIS OF LEFT LOWER EXTREMITY: ICD-10-CM

## 2024-04-10 LAB
ALBUMIN SERPL-MCNC: 3.5 G/DL (ref 3.4–4.8)
ALBUMIN/GLOB SERPL: 0.8 RATIO (ref 1.1–2)
ALP SERPL-CCNC: 62 UNIT/L (ref 40–150)
ALT SERPL-CCNC: 32 UNIT/L (ref 0–55)
AST SERPL-CCNC: 27 UNIT/L (ref 5–34)
BASOPHILS # BLD AUTO: 0.07 X10(3)/MCL
BASOPHILS NFR BLD AUTO: 0.6 %
BILIRUB SERPL-MCNC: 1 MG/DL
BUN SERPL-MCNC: 12.7 MG/DL (ref 8.4–25.7)
CALCIUM SERPL-MCNC: 9.4 MG/DL (ref 8.8–10)
CHLORIDE SERPL-SCNC: 101 MMOL/L (ref 98–107)
CO2 SERPL-SCNC: 23 MMOL/L (ref 23–31)
CREAT SERPL-MCNC: 1.07 MG/DL (ref 0.73–1.18)
EOSINOPHIL # BLD AUTO: 0.29 X10(3)/MCL (ref 0–0.9)
EOSINOPHIL NFR BLD AUTO: 2.7 %
ERYTHROCYTE [DISTWIDTH] IN BLOOD BY AUTOMATED COUNT: 13 % (ref 11.5–17)
GFR SERPLBLD CREATININE-BSD FMLA CKD-EPI: >60 MLS/MIN/1.73/M2
GLOBULIN SER-MCNC: 4.4 GM/DL (ref 2.4–3.5)
GLUCOSE SERPL-MCNC: 112 MG/DL (ref 82–115)
HCT VFR BLD AUTO: 43.4 % (ref 42–52)
HGB BLD-MCNC: 14.7 G/DL (ref 14–18)
IMM GRANULOCYTES # BLD AUTO: 0.07 X10(3)/MCL (ref 0–0.04)
IMM GRANULOCYTES NFR BLD AUTO: 0.6 %
LACTATE SERPL-SCNC: 1.4 MMOL/L (ref 0.5–2.2)
LYMPHOCYTES # BLD AUTO: 2.13 X10(3)/MCL (ref 0.6–4.6)
LYMPHOCYTES NFR BLD AUTO: 19.7 %
MCH RBC QN AUTO: 33 PG (ref 27–31)
MCHC RBC AUTO-ENTMCNC: 33.9 G/DL (ref 33–36)
MCV RBC AUTO: 97.3 FL (ref 80–94)
MONOCYTES # BLD AUTO: 1.07 X10(3)/MCL (ref 0.1–1.3)
MONOCYTES NFR BLD AUTO: 9.9 %
NEUTROPHILS # BLD AUTO: 7.18 X10(3)/MCL (ref 2.1–9.2)
NEUTROPHILS NFR BLD AUTO: 66.5 %
NRBC BLD AUTO-RTO: 0 %
PLATELET # BLD AUTO: 297 X10(3)/MCL (ref 130–400)
PMV BLD AUTO: 8.8 FL (ref 7.4–10.4)
POTASSIUM SERPL-SCNC: 4 MMOL/L (ref 3.5–5.1)
PROT SERPL-MCNC: 7.9 GM/DL (ref 5.8–7.6)
RBC # BLD AUTO: 4.46 X10(6)/MCL (ref 4.7–6.1)
SODIUM SERPL-SCNC: 136 MMOL/L (ref 136–145)
WBC # SPEC AUTO: 10.81 X10(3)/MCL (ref 4.5–11.5)

## 2024-04-10 PROCEDURE — 96374 THER/PROPH/DIAG INJ IV PUSH: CPT

## 2024-04-10 PROCEDURE — 83605 ASSAY OF LACTIC ACID: CPT | Performed by: PHYSICIAN ASSISTANT

## 2024-04-10 PROCEDURE — 25000003 PHARM REV CODE 250: Performed by: PHYSICIAN ASSISTANT

## 2024-04-10 PROCEDURE — 85025 COMPLETE CBC W/AUTO DIFF WBC: CPT | Performed by: PHYSICIAN ASSISTANT

## 2024-04-10 PROCEDURE — 99285 EMERGENCY DEPT VISIT HI MDM: CPT | Mod: 25

## 2024-04-10 PROCEDURE — 80053 COMPREHEN METABOLIC PANEL: CPT | Performed by: PHYSICIAN ASSISTANT

## 2024-04-10 PROCEDURE — 99214 OFFICE O/P EST MOD 30 MIN: CPT | Mod: ,,, | Performed by: FAMILY MEDICINE

## 2024-04-10 PROCEDURE — 87040 BLOOD CULTURE FOR BACTERIA: CPT | Performed by: PHYSICIAN ASSISTANT

## 2024-04-10 PROCEDURE — 96375 TX/PRO/DX INJ NEW DRUG ADDON: CPT

## 2024-04-10 RX ORDER — ACETAMINOPHEN 500 MG
1000 TABLET ORAL
Status: COMPLETED | OUTPATIENT
Start: 2024-04-10 | End: 2024-04-10

## 2024-04-10 RX ADMIN — ACETAMINOPHEN 1000 MG: 500 TABLET ORAL at 05:04

## 2024-04-10 NOTE — FIRST PROVIDER EVALUATION
"Medical screening examination initiated.  I have conducted a focused provider triage encounter, findings are as follows:    Chief Complaint   Patient presents with    Leg Swelling     C/o RLE leg swelling x3 weeks. Swelling, redness and shine noted to LLE. Hx of neuropathy. Denies SOB, CP, & N/V/D. Dopper DP pulse of 119.      Brief history of present illness:  58 y.o. male presents to the ED with LLE leg swelling for the last 3 weeks with worsening wound to the left heel. Hx of neuropathy due to previous injury, no hx of diabetes. Recently went to [facility] where they debrided wound. States he saw PCP today and told to come in     Vitals:    04/10/24 1727 04/10/24 1730   BP: (!) 167/87    Pulse: (!) 120    Resp: (!) 22    Temp: 100.1 °F (37.8 °C) 100.1 °F (37.8 °C)   TempSrc: Oral    SpO2: 95%    Weight: 113.4 kg (250 lb)    Height: 6' 1" (1.854 m)      Pertinent physical exam:  Awake, alert, ambulatory with limp, non-labored respirations, wound to heel with foul odor noted     Brief workup plan:  labs, XR, meds    Preliminary workup initiated; this workup will be continued and followed by the physician or advanced practice provider that is assigned to the patient when roomed.  "

## 2024-04-11 LAB
ALBUMIN SERPL-MCNC: 3 G/DL (ref 3.4–4.8)
ALBUMIN/GLOB SERPL: 1 RATIO (ref 1.1–2)
ALP SERPL-CCNC: 51 UNIT/L (ref 40–150)
ALT SERPL-CCNC: 26 UNIT/L (ref 0–55)
AST SERPL-CCNC: 21 UNIT/L (ref 5–34)
BASOPHILS # BLD AUTO: 0.05 X10(3)/MCL
BASOPHILS NFR BLD AUTO: 0.5 %
BILIRUB SERPL-MCNC: 0.9 MG/DL
BUN SERPL-MCNC: 13.4 MG/DL (ref 8.4–25.7)
CALCIUM SERPL-MCNC: 8 MG/DL (ref 8.8–10)
CHLORIDE SERPL-SCNC: 103 MMOL/L (ref 98–107)
CO2 SERPL-SCNC: 24 MMOL/L (ref 23–31)
CREAT SERPL-MCNC: 0.95 MG/DL (ref 0.73–1.18)
CRP SERPL-MCNC: 69.4 MG/L
EOSINOPHIL # BLD AUTO: 0.25 X10(3)/MCL (ref 0–0.9)
EOSINOPHIL NFR BLD AUTO: 2.5 %
ERYTHROCYTE [DISTWIDTH] IN BLOOD BY AUTOMATED COUNT: 12.8 % (ref 11.5–17)
GFR SERPLBLD CREATININE-BSD FMLA CKD-EPI: >60 MLS/MIN/1.73/M2
GLOBULIN SER-MCNC: 3 GM/DL (ref 2.4–3.5)
GLUCOSE SERPL-MCNC: 159 MG/DL (ref 82–115)
HCT VFR BLD AUTO: 38.2 % (ref 42–52)
HGB BLD-MCNC: 12.7 G/DL (ref 14–18)
IMM GRANULOCYTES # BLD AUTO: 0.05 X10(3)/MCL (ref 0–0.04)
IMM GRANULOCYTES NFR BLD AUTO: 0.5 %
LYMPHOCYTES # BLD AUTO: 1.41 X10(3)/MCL (ref 0.6–4.6)
LYMPHOCYTES NFR BLD AUTO: 14.2 %
MAGNESIUM SERPL-MCNC: 2 MG/DL (ref 1.6–2.6)
MCH RBC QN AUTO: 32.4 PG (ref 27–31)
MCHC RBC AUTO-ENTMCNC: 33.2 G/DL (ref 33–36)
MCV RBC AUTO: 97.4 FL (ref 80–94)
MONOCYTES # BLD AUTO: 0.96 X10(3)/MCL (ref 0.1–1.3)
MONOCYTES NFR BLD AUTO: 9.7 %
MRSA PCR SCRN (OHS): NOT DETECTED
NEUTROPHILS # BLD AUTO: 7.2 X10(3)/MCL (ref 2.1–9.2)
NEUTROPHILS NFR BLD AUTO: 72.6 %
NRBC BLD AUTO-RTO: 0 %
PHOSPHATE SERPL-MCNC: 3.6 MG/DL (ref 2.3–4.7)
PLATELET # BLD AUTO: 259 X10(3)/MCL (ref 130–400)
PMV BLD AUTO: 8.8 FL (ref 7.4–10.4)
POTASSIUM SERPL-SCNC: 4.1 MMOL/L (ref 3.5–5.1)
PROT SERPL-MCNC: 6 GM/DL (ref 5.8–7.6)
RBC # BLD AUTO: 3.92 X10(6)/MCL (ref 4.7–6.1)
SODIUM SERPL-SCNC: 136 MMOL/L (ref 136–145)
WBC # SPEC AUTO: 9.92 X10(3)/MCL (ref 4.5–11.5)

## 2024-04-11 PROCEDURE — 87186 SC STD MICRODIL/AGAR DIL: CPT

## 2024-04-11 PROCEDURE — 84100 ASSAY OF PHOSPHORUS: CPT | Performed by: NURSE PRACTITIONER

## 2024-04-11 PROCEDURE — 86140 C-REACTIVE PROTEIN: CPT | Performed by: EMERGENCY MEDICINE

## 2024-04-11 PROCEDURE — 11000001 HC ACUTE MED/SURG PRIVATE ROOM

## 2024-04-11 PROCEDURE — 87077 CULTURE AEROBIC IDENTIFY: CPT

## 2024-04-11 PROCEDURE — 83735 ASSAY OF MAGNESIUM: CPT | Performed by: NURSE PRACTITIONER

## 2024-04-11 PROCEDURE — 63600175 PHARM REV CODE 636 W HCPCS: Performed by: EMERGENCY MEDICINE

## 2024-04-11 PROCEDURE — 87077 CULTURE AEROBIC IDENTIFY: CPT | Performed by: PHYSICIAN ASSISTANT

## 2024-04-11 PROCEDURE — 25000003 PHARM REV CODE 250: Performed by: PHYSICIAN ASSISTANT

## 2024-04-11 PROCEDURE — 25000003 PHARM REV CODE 250: Performed by: NURSE PRACTITIONER

## 2024-04-11 PROCEDURE — 63600175 PHARM REV CODE 636 W HCPCS: Performed by: NURSE PRACTITIONER

## 2024-04-11 PROCEDURE — 87070 CULTURE OTHR SPECIMN AEROBIC: CPT | Performed by: PHYSICIAN ASSISTANT

## 2024-04-11 PROCEDURE — 63600175 PHARM REV CODE 636 W HCPCS: Performed by: PHYSICIAN ASSISTANT

## 2024-04-11 PROCEDURE — 85025 COMPLETE CBC W/AUTO DIFF WBC: CPT | Performed by: NURSE PRACTITIONER

## 2024-04-11 PROCEDURE — 25000003 PHARM REV CODE 250: Performed by: EMERGENCY MEDICINE

## 2024-04-11 PROCEDURE — 87641 MR-STAPH DNA AMP PROBE: CPT | Performed by: INTERNAL MEDICINE

## 2024-04-11 PROCEDURE — 80053 COMPREHEN METABOLIC PANEL: CPT | Performed by: NURSE PRACTITIONER

## 2024-04-11 RX ORDER — HYDROMORPHONE HYDROCHLORIDE 2 MG/ML
1 INJECTION, SOLUTION INTRAMUSCULAR; INTRAVENOUS; SUBCUTANEOUS
Status: COMPLETED | OUTPATIENT
Start: 2024-04-11 | End: 2024-04-11

## 2024-04-11 RX ORDER — IBUPROFEN 200 MG
24 TABLET ORAL
Status: DISCONTINUED | OUTPATIENT
Start: 2024-04-11 | End: 2024-04-15 | Stop reason: HOSPADM

## 2024-04-11 RX ORDER — ONDANSETRON HYDROCHLORIDE 2 MG/ML
4 INJECTION, SOLUTION INTRAVENOUS EVERY 4 HOURS PRN
Status: DISCONTINUED | OUTPATIENT
Start: 2024-04-11 | End: 2024-04-15 | Stop reason: HOSPADM

## 2024-04-11 RX ORDER — HYDROCODONE BITARTRATE AND ACETAMINOPHEN 7.5; 325 MG/1; MG/1
1 TABLET ORAL EVERY 6 HOURS PRN
Status: DISCONTINUED | OUTPATIENT
Start: 2024-04-11 | End: 2024-04-15 | Stop reason: HOSPADM

## 2024-04-11 RX ORDER — VANCOMYCIN HCL IN 5 % DEXTROSE 1G/250ML
1000 PLASTIC BAG, INJECTION (ML) INTRAVENOUS
Status: DISCONTINUED | OUTPATIENT
Start: 2024-04-11 | End: 2024-04-11

## 2024-04-11 RX ORDER — ENOXAPARIN SODIUM 100 MG/ML
40 INJECTION SUBCUTANEOUS EVERY 24 HOURS
Status: DISCONTINUED | OUTPATIENT
Start: 2024-04-11 | End: 2024-04-15 | Stop reason: HOSPADM

## 2024-04-11 RX ORDER — IBUPROFEN 200 MG
16 TABLET ORAL
Status: DISCONTINUED | OUTPATIENT
Start: 2024-04-11 | End: 2024-04-15 | Stop reason: HOSPADM

## 2024-04-11 RX ORDER — SODIUM CHLORIDE 0.9 % (FLUSH) 0.9 %
10 SYRINGE (ML) INJECTION EVERY 12 HOURS PRN
Status: DISCONTINUED | OUTPATIENT
Start: 2024-04-11 | End: 2024-04-15 | Stop reason: HOSPADM

## 2024-04-11 RX ORDER — ONDANSETRON HYDROCHLORIDE 2 MG/ML
4 INJECTION, SOLUTION INTRAVENOUS
Status: COMPLETED | OUTPATIENT
Start: 2024-04-11 | End: 2024-04-11

## 2024-04-11 RX ORDER — KETOROLAC TROMETHAMINE 30 MG/ML
15 INJECTION, SOLUTION INTRAMUSCULAR; INTRAVENOUS
Status: COMPLETED | OUTPATIENT
Start: 2024-04-11 | End: 2024-04-11

## 2024-04-11 RX ORDER — GLUCAGON 1 MG
1 KIT INJECTION
Status: DISCONTINUED | OUTPATIENT
Start: 2024-04-11 | End: 2024-04-15 | Stop reason: HOSPADM

## 2024-04-11 RX ORDER — ACETAMINOPHEN 325 MG/1
650 TABLET ORAL EVERY 4 HOURS PRN
Status: DISCONTINUED | OUTPATIENT
Start: 2024-04-11 | End: 2024-04-15 | Stop reason: HOSPADM

## 2024-04-11 RX ORDER — ACETAMINOPHEN 325 MG/1
650 TABLET ORAL EVERY 8 HOURS PRN
Status: DISCONTINUED | OUTPATIENT
Start: 2024-04-11 | End: 2024-04-15 | Stop reason: HOSPADM

## 2024-04-11 RX ADMIN — DEXTROSE MONOHYDRATE 1750 MG: 5 INJECTION, SOLUTION INTRAVENOUS at 04:04

## 2024-04-11 RX ADMIN — ENOXAPARIN SODIUM 40 MG: 40 INJECTION SUBCUTANEOUS at 04:04

## 2024-04-11 RX ADMIN — HYDROCODONE BITARTRATE AND ACETAMINOPHEN 1 TABLET: 7.5; 325 TABLET ORAL at 04:04

## 2024-04-11 RX ADMIN — KETOROLAC TROMETHAMINE 15 MG: 30 INJECTION, SOLUTION INTRAMUSCULAR at 02:04

## 2024-04-11 RX ADMIN — ONDANSETRON 4 MG: 2 INJECTION INTRAMUSCULAR; INTRAVENOUS at 02:04

## 2024-04-11 RX ADMIN — SODIUM CHLORIDE 2000 ML: 9 INJECTION, SOLUTION INTRAVENOUS at 02:04

## 2024-04-11 RX ADMIN — ACETAMINOPHEN 650 MG: 325 TABLET, FILM COATED ORAL at 11:04

## 2024-04-11 RX ADMIN — PIPERACILLIN SODIUM AND TAZOBACTAM SODIUM 4.5 G: 4; .5 INJECTION, POWDER, LYOPHILIZED, FOR SOLUTION INTRAVENOUS at 09:04

## 2024-04-11 RX ADMIN — HYDROMORPHONE HYDROCHLORIDE 1 MG: 2 INJECTION INTRAMUSCULAR; INTRAVENOUS; SUBCUTANEOUS at 02:04

## 2024-04-11 RX ADMIN — PIPERACILLIN SODIUM AND TAZOBACTAM SODIUM 4.5 G: 4; .5 INJECTION, POWDER, LYOPHILIZED, FOR SOLUTION INTRAVENOUS at 03:04

## 2024-04-11 RX ADMIN — PIPERACILLIN SODIUM AND TAZOBACTAM SODIUM 4.5 G: 4; .5 INJECTION, POWDER, LYOPHILIZED, FOR SOLUTION INTRAVENOUS at 11:04

## 2024-04-11 RX ADMIN — VANCOMYCIN HYDROCHLORIDE 2500 MG: 1.25 INJECTION, POWDER, LYOPHILIZED, FOR SOLUTION INTRAVENOUS at 04:04

## 2024-04-11 NOTE — H&P
[facility] Hospital Medicine History & Physical Examination       Patient Name: Kamaljit Frias  MRN: [id number]  Patient Class: IP- Inpatient   Admission Date: 4/10/2024   Admitting Physician: Ilsa Hernandez MD   Length of Stay: 0  Attending Physician: Sho Heard DO  Primary Care Provider: Jian Walter MD  Face-to-Face encounter date: 04/11/2024  Code Status: Full Code   Chief Complaint: Leg Swelling (C/o RLE leg swelling x3 weeks. Swelling, redness and shine noted to LLE. Hx of neuropathy. Denies SOB, CP, & N/V/D. Dopper DP pulse of 119. )        Patient information was obtained from patient, patient's family, past medical records and ER records.     HISTORY OF PRESENT ILLNESS:   Kamaljit Frias is a 58 y.o. 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 The patient presented to [facility] on 4/10/2024 with a primary complaint of  left lower extremity swelling and redness with wound left foot with drainage.  He reports noticing a blister to left heel paroxysmally week ago which popped on its own.  Reports having bloody drainage but for the last 2-3 days symptoms worsened prompting him to present to his primary care provider yesterday (4/10/2024) where it was recommended he present to the ED for admission.  Patient reports drinking alcohol since he has been living out of his car but does not quantify how often or how much.  He does report his last drink was a few days ago.    Upon presentation to the ED, temperature 100.1° F, heart rate 120, blood pressure 167/87, respiratory rate 22 and SpO2 95% on room air.  Labs significant for WBC 10.81.  Venous ultrasound of left lower extremity with subcutaneous edema located to the proximal calf and distal calf vein but no DVT noted.  He received Tylenol, Dilaudid, Toradol, Zofran,  Zosyn and vancomycin.  Patient is admitted to hospital medicine services for further medical management.    PAST MEDICAL HISTORY:     Past Medical History:   Diagnosis Date    Essential (primary) hypertension     Neuropathy of lower extremity        PAST SURGICAL HISTORY:     Past Surgical History:   Procedure Laterality Date    COLOSTOMY  1981    Detachment at Left 1st Toe, Complete, Open Approach Left 03/13/2017    herniated disc repaired   2015    left femur break   2008    left hip replacement  02/02/2023    sciatic nerve repair  Left 1980   Bilateral hip replacement       ALLERGIES:   Patient has no known allergies.    FAMILY HISTORY:   Reviewed and negative    SOCIAL HISTORY:   Former smoker  Drinks alcohol   Denies illicit drug use     HOME MEDICATIONS:     Prior to Admission medications    Medication Sig Start Date End Date Taking? Authorizing Provider   lisinopriL (PRINIVIL,ZESTRIL) 20 MG tablet Take 1 tablet by mouth once daily. 3/25/24  Yes Jian Walter MD   tadalafiL (CIALIS) 20 MG Tab Take 1 tablet (20 mg total) by mouth once daily.  Patient not taking: Reported on 4/10/2024 4/25/23 4/24/24  Jian Walter MD       REVIEW OF SYSTEMS:   Except as documented, all other systems reviewed and negative     PHYSICAL EXAM:     VITAL SIGNS: 24 HRS MIN & MAX LAST   Temp  Min: 97.8 °F (36.6 °C)  Max: 100.1 °F (37.8 °C) 97.8 °F (36.6 °C)   BP  Min: 128/78  Max: 167/87 (!) 139/93   Pulse  Min: 81  Max: 120  88   Resp  Min: 18  Max: 22 18   SpO2  Min: 93 %  Max: 97 % 96 %       General appearance: Well-developed, well-nourished male in no apparent distress.  No family at bedside.  HEENT: Atraumatic head. Moist mucous membranes of oral cavity.  Lungs: Clear to auscultation bilaterally.   Heart: Regular rate and rhythm.  2+ pitting edema bilateral lower extremities.  Abdomen: Soft, non-distended.  Extremities: No cyanosis, clubbing. No deformities.  Skin: No Rash. Warm and dry.  Be media for picture of left foot  wound.  Neuro: Awake, alert and oriented. Motor and sensory exams grossly intact.  Psych/mental status: Appropriate mood and affect. Cooperative. Responds appropriately to questions.       LABS AND IMAGING:     Recent Labs   Lab 04/10/24  2308 04/11/24  0529   WBC 10.81 9.92   RBC 4.46* 3.92*   HGB 14.7 12.7*   HCT 43.4 38.2*   MCV 97.3* 97.4*   MCH 33.0* 32.4*   MCHC 33.9 33.2   RDW 13.0 12.8    259   MPV 8.8 8.8       Recent Labs   Lab 04/10/24  2308 04/11/24  0529    136   K 4.0 4.1   CO2 23 24   BUN 12.7 13.4   CREATININE 1.07 0.95   CALCIUM 9.4 8.0*   MG  --  2.00   ALBUMIN 3.5 3.0*   ALKPHOS 62 51   ALT 32 26   AST 27 21   BILITOT 1.0 0.9       Microbiology Results (last 7 days)       Procedure Component Value Units Date/Time    Wound Culture [[id number]]     Order Status: Sent Specimen: Wound from Foot, Heel Left     Blood culture #1 **CANNOT BE ORDERED STAT** [[id number]] Collected: 04/10/24 2308    Order Status: Resulted Specimen: Blood Updated: 04/10/24 2322    Blood culture #2 **CANNOT BE ORDERED STAT** [[id number]] Collected: 04/10/24 2308    Order Status: Resulted Specimen: Blood Updated: 04/10/24 2322             CV Ultrasound doppler venous DVT leg left  There was no evidence of deep or superficial vein thrombosis in the left   lower extremity.  X-Ray Foot Complete Left  Narrative: EXAMINATION:  XR FOOT COMPLETE 3 VIEW LEFT    CLINICAL HISTORY:  Leg swelling.    TECHNIQUE:  Three views    COMPARISON:  None available.    FINDINGS:  There is previous amputation of the great toe.  There is also chronic deformity of distal aspect of the 2nd toe with corticated margins.  No acute lytic destructive changes identified.  No acute fracture or dislocation.  There is soft tissue inflammation.  Impression: No acute osseous abnormality identified.    Electronically signed by: Sandro Lomas  Date:    04/10/2024  Time:    18:28        ASSESSMENT & PLAN:   Assessment:  Left lower extremity  cellulitis  Left foot wound  History of hypertension and neuropathy     Plan:  Presents for leg swelling and currently being treated for cellulitis; nonseptic at this time and labs without evidence of leukocytosis/neutrophilia.  Xrays without destruction noted. No plans for further imaging at this time but will followup with podiatrist recommendations.  Followup on blood culture results.  Continue care as noted.   Followup with recommendations of podiatrist   - Podiatry consulted.  Appreciate recommendations  - Continue with vancomycin and Zosyn   - Follow results of blood and wound culture  - Norco as needed for pain  - Wound care consulted  - Resume appropriate home medications when deemed necessary   - Labs in AM      VTE Prophylaxis: will be placed on Lovenox for DVT prophylaxis and will be advised to be as mobile as possible and sit in a chair as tolerated      __________________________________________________________________________  INPATIENT LIST OF MEDICATIONS     Current Facility-Administered Medications:     acetaminophen tablet 650 mg, 650 mg, Oral, Q8H PRN, Ailyn Morgan, PA-C, 650 mg at 04/11/24 1145    acetaminophen tablet 650 mg, 650 mg, Oral, Q4H PRN, Ailyn Morgan, PA-EVELYNE    dextrose 10% bolus 125 mL 125 mL, 12.5 g, Intravenous, PRN, Ailyn Morgan, PA-C    dextrose 10% bolus 250 mL 250 mL, 25 g, Intravenous, PRNEddie Kallie E., PA-EVELYNE    enoxaparin injection 40 mg, 40 mg, Subcutaneous, Daily, Ailyn Morgan, PA-EVELYNE    glucagon (human recombinant) injection 1 mg, 1 mg, Intramuscular, PRNEddie Kallie E., PA-EVELYNE    glucose chewable tablet 16 g, 16 g, Oral, PRNEddie Kallie E., PA-C    glucose chewable tablet 24 g, 24 g, Oral, PRN, Ailyn Morgan, PA-C    ondansetron injection 4 mg, 4 mg, Intravenous, Q4H PRN, Ailyn Morgan, PA-C    piperacillin-tazobactam (ZOSYN) 4.5 g in dextrose 5 % in water (D5W) 100 mL IVPB (MB+), 4.5 g, Intravenous, Q8H, Ree Shaw, AGABERTP-BC, Last  Rate: 25 mL/hr at 04/11/24 1151, 4.5 g at 04/11/24 1151    sodium chloride 0.9% flush 10 mL, 10 mL, Intravenous, Q12H PRN, Ailyn Morgan PA-C    vancomycin 1.75 g in 5 % dextrose 500 mL IVPB, 1,750 mg, Intravenous, Q12H, Colin Ree G, AGACNP-BC      Scheduled Meds:   enoxparin  40 mg Subcutaneous Daily    piperacillin-tazobactam (Zosyn) IV (PEDS and ADULTS) (extended infusion is not appropriate)  4.5 g Intravenous Q8H    vancomycin (VANCOCIN) IV (PEDS and ADULTS)  1,750 mg Intravenous Q12H     Continuous Infusions:  PRN Meds:.acetaminophen, acetaminophen, dextrose 10%, dextrose 10%, glucagon (human recombinant), glucose, glucose, ondansetron, sodium chloride 0.9%      Discharge Planning and Disposition: Anticipated discharge to be determined.    IAilyn PA, have reviewed and discussed the case with Dr. Sho Heard, DO      Please see the following addendum for further assessment and plan from there attending MD.    Ailyn Morgan PA-C  04/11/2024    MD Addendum:  IDr. Heard assumed care of this patient today.  For the patient encounter, I performed the substantive portion of the visit, I reviewed the NP/PA documentation, treatment plan, and medical decision making.I will continue to monitor closely and make adjustments to medical management as needed.     Dr. Sho Heard DO    I have spent >30 minutes on the day of the visit; time spent includes face to face time and non-face to face time preparing to see the patient (eg, review of tests), independently reviewing and interpreting medical records, both past and current; documenting clinical information in the electronic or other health record, and communicating results to the patient/family/caregiver and care coordinator and nursing team.

## 2024-04-11 NOTE — NURSING
Nurses Note -- 4 Eyes      4/11/2024   10:47 AM      Skin assessed during: Admit      [] No Altered Skin Integrity Present    []Prevention Measures Documented      [x] Yes- Altered Skin Integrity Present or Discovered   [x] LDA Added if Not in Epic (Describe Wound)   [x] New Altered Skin Integrity was Present on Admit and Documented in LDA   [x] Wound Image Taken    Wound Care Consulted? Yes    Attending Nurse: Katlyn Mishra RN/Staff Member:   HUONG Love

## 2024-04-11 NOTE — NURSING
[facility] Observation [facility]  Wound Care    Patient Name:  Kamaljit Frias   MRN:  [id number]  Date: 4/11/2024  Diagnosis: <principal problem not specified>    History:     Past Medical History:   Diagnosis Date    Essential (primary) hypertension     Neuropathy of lower extremity        Social History     Socioeconomic History    Marital status:     Number of children: 0   Occupational History    Occupation: oil    Tobacco Use    Smoking status: Former    Smokeless tobacco: Never    Tobacco comments:     Age started: 18; Age stopped: 54   Substance and Sexual Activity    Alcohol use: Yes     Comment: 1-2 times per week    Drug use: Never       Precautions:     Allergies as of 04/10/2024    (No Known Allergies)       WO Assessment Details/Treatment   Consult received for left heel wound. Patient awake, alert, in bed with left foot elevated on pillows. Patient states this wound started about 3 weeks ago, then developed to a blister, not sure if it was due to him sleeping in his car and having his feet down all the time. Patient with amputation of left first toe states was about 10 years ago, due to getting an infection in the toe. Patient states he has had neuropathy in the left leg since the age of 16 due to nerve damage in back. Bilateral lower legs with 3+edema, palpable pulses. Left heel with dry brown skin periwound, dry skin trimmed to prevent catching on dressings. Instructed patient on need to keep off of heel and protect from pressure. Instructed on the appropriate use of heel boots only to be worn in bed. Patient states understanding.      04/11/24 1205   WOCN Assessment   WOCN Total Time (mins) 60   Visit Date 04/11/24   Visit Time 1205   Consult Type New   WOCN Speciality Wound   Wound neuropathic   Number of Wounds 1   Intervention assessed;changed;applied;chart review;orders;coordination of care   Skin Interventions   Pressure Reduction Techniques heels elevated off  bed   Positioning   Body Position position changed independently   Head of Bed (HOB) Positioning HOB at 30 degrees        Altered Skin Integrity 04/11/24 0130 Left plantar Heel Ulceration   Date First Assessed/Time First Assessed: 04/11/24 0130   Altered Skin Integrity Present on Admission - Did Patient arrive to the hospital with altered skin?: yes  Side: Left  Orientation: plantar  Location: Heel  Primary Wound Type: (c) Ulceration   Wound Image     Dressing Appearance Open to air   Drainage Amount Moderate   Drainage Characteristics/Odor Clear;Serous   Appearance Slough;Eschar;Not granulating   Tissue loss description Full thickness   Black (%), Wound Tissue Color 20 %   Red (%), Wound Tissue Color 30 %   Yellow (%), Wound Tissue Color 50 %   Periwound Area Edematous;Redness   Wound Edges Defined   Wound Length (cm) 5.5 cm   Wound Width (cm) 7 cm   Wound Depth (cm) 0.2 cm   Wound Volume (cm^3) 7.7 cm^3   Wound Surface Area (cm^2) 38.5 cm^2   Care Wound cleanser  (Vashe)   Dressing Applied  (Vashe moistened mesalt, gauze, abd, kerlix, tape)         Recommendations made to primary team for wound care with vashe moistened mesalt, cover with gauze, abd pad, wrap with kerlix. Heel boot to left foot while in bed only, patient is not to walk with boot on. Suggest nurse primary may want consult to podiatry . Orders placed.     04/11/2024

## 2024-04-11 NOTE — ED PROVIDER NOTES
Encounter Date: 4/10/2024    SCRIBE #1 NOTE: I, Maria Ines Vanessa, am scribing for, and in the presence of,  Jared Rodriguez MD. I have scribed the following portions of the note - Other sections scribed: HPI, ROS, PE.       History     Chief Complaint   Patient presents with    Leg Swelling     C/o RLE leg swelling x3 weeks. Swelling, redness and shine noted to LLE. Hx of neuropathy. Denies SOB, CP, & N/V/D. Dopper DP pulse of 119.      58 year old male with history of HTN and neuropathy presents to the ED with complaints of pain and swelling to his left ankle and foot. Pt reports that the swelling began a few weeks ago, and last week he noticed a blister on his left heel. He states that he went to the ED on 4/4/24 and was told to follow up with his PCP. Since the ED visit, the redness has radiated up his leg and the blister is now ruptured with foul smelling drainage. Pt states that his appointment with his PCP was yesterday (4/10) and he sent pt here. Pt also complains of chills. Pt notes that he has been living in his car for the last month.     The history is provided by the patient. No  was used.     Review of patient's allergies indicates:  No Known Allergies  Past Medical History:   Diagnosis Date    Essential (primary) hypertension     Neuropathy of lower extremity      Past Surgical History:   Procedure Laterality Date    COLOSTOMY  1981    Detachment at Left 1st Toe, Complete, Open Approach Left 03/13/2017    herniated disc repaired   2015    left femur break   2008    left hip replacement  02/02/2023    sciatic nerve repair  Left 1980     Family History   Problem Relation Age of Onset    No Known Problems Mother     Stroke Father     Fibromyalgia Sister     No Known Problems Sister      Social History     Tobacco Use    Smoking status: Former    Smokeless tobacco: Never    Tobacco comments:     Age started: 18; Age stopped: 54   Substance Use Topics    Alcohol use: Yes     Comment: 1-2  times per week    Drug use: Never     Review of Systems   Constitutional:  Positive for chills.   Musculoskeletal:         Pain, redness, and swelling to LLE.    Skin:         Drainage and foul odor from blister on left heel.        Physical Exam     Initial Vitals [04/10/24 1727]   BP Pulse Resp Temp SpO2   (!) 167/87 (!) 120 (!) 22 100.1 °F (37.8 °C) 95 %      MAP       --         Physical Exam    Constitutional: He appears well-developed and well-nourished. No distress.   HENT:   Head: Normocephalic and atraumatic.   Cardiovascular:  Normal rate.           Pulmonary/Chest: No respiratory distress. He has no wheezes. He has no rhonchi. He exhibits no tenderness.   Abdominal: Abdomen is soft. He exhibits no distension. There is no abdominal tenderness. There is no rebound and no guarding.   Musculoskeletal:         General: Normal range of motion.     Neurological: He is alert and oriented to person, place, and time. He has normal strength.   Skin: Skin is warm and dry.   Ruptured blister with thick purulent drainage and eschar around the edges on the left heel. Erythema to left heel and ankle with streaking up the leg.      Psychiatric: He has a normal mood and affect.         ED Course   Procedures  Labs Reviewed   COMPREHENSIVE METABOLIC PANEL - Abnormal; Notable for the following components:       Result Value    Protein Total 7.9 (*)     Globulin 4.4 (*)     Albumin/Globulin Ratio 0.8 (*)     All other components within normal limits   CBC WITH DIFFERENTIAL - Abnormal; Notable for the following components:    RBC 4.46 (*)     MCV 97.3 (*)     MCH 33.0 (*)     IG# 0.07 (*)     All other components within normal limits   LACTIC ACID, PLASMA - Normal   BLOOD CULTURE [facility]   BLOOD CULTURE [facility]   CBC W/ AUTO DIFFERENTIAL    Narrative:     The following orders were created for panel order CBC auto differential.  Procedure                               Abnormality         Status                     ---------                                -----------         ------                     CBC with Differential[[id number]]       Abnormal            Final result                 Please view results for these tests on the individual orders.   C-REACTIVE PROTEIN          Imaging Results              X-Ray Foot Complete Left (Final result)  Result time 04/10/24 18:28:24      Final result by Sandro Lomas MD (04/10/24 18:28:24)                   Impression:      No acute osseous abnormality identified.      Electronically signed by: Sandro Lomas  Date:    04/10/2024  Time:    18:28               Narrative:    EXAMINATION:  XR FOOT COMPLETE 3 VIEW LEFT    CLINICAL HISTORY:  Leg swelling.    TECHNIQUE:  Three views    COMPARISON:  None available.    FINDINGS:  There is previous amputation of the great toe.  There is also chronic deformity of distal aspect of the 2nd toe with corticated margins.  No acute lytic destructive changes identified.  No acute fracture or dislocation.  There is soft tissue inflammation.                                       Medications   sodium chloride 0.9% bolus 2,000 mL 2,000 mL (2,000 mLs Intravenous New Bag 4/11/24 0242)   acetaminophen tablet 1,000 mg (1,000 mg Oral Given 4/10/24 1730)   ketorolac injection 15 mg (15 mg Intravenous Given 4/11/24 0243)   HYDROmorphone (PF) injection 1 mg (1 mg Intravenous Given 4/11/24 0243)   ondansetron injection 4 mg (4 mg Intravenous Given 4/11/24 0243)     Medical Decision Making  Differential diagnosis includes but is not limited to trench foot, ulcer, cellulitis       Problems Addressed:  Cellulitis, unspecified cellulitis site: acute illness or injury that poses a threat to life or bodily functions  Infected ulcer of skin, unspecified ulcer stage: acute illness or injury that poses a threat to life or bodily functions    Amount and/or Complexity of Data Reviewed  External Data Reviewed: notes.  Labs: ordered.  Radiology: ordered.    Risk  Prescription drug  management.  Parenteral controlled substances.              Attending Attestation:           Physician Attestation for Scribe:  Physician Attestation Statement for Scribe #1: I, Jared Rodriguez MD, reviewed documentation, as scribed by Maria Ines Vanessa in my presence, and it is both accurate and complete.                                    Clinical Impression:  Final diagnoses:  [T14.8XXA, L08.9] Wound infection  [M79.89] Left leg swelling  [L98.499, L08.9] Infected ulcer of skin, unspecified ulcer stage (Primary)  [L03.90] Cellulitis, unspecified cellulitis site - left lower leg          ED Disposition Condition    Admit Stable                Jared Rodriguez MD  04/11/24 7019

## 2024-04-11 NOTE — PROGRESS NOTES
"Pharmacokinetic Initial Assessment: IV Vancomycin    Assessment/Plan:    Initiate intravenous vancomycin with loading dose of 2500 mg once followed by a maintenance dose of vancomycin 1750 mg IV every 12 hours  Desired empiric serum trough concentration is 15 to 20 mcg/mL  Draw vancomycin trough level 60 min prior to fourth dose on 04/12 at approximately 1500  Pharmacy will continue to follow and monitor vancomycin.      Please contact pharmacy at extension [phone] with any questions regarding this assessment.     Thank you for the consult,   Paresh Valdes       Patient brief summary:  Kamaljit Frias is a 58 y.o. male initiated on antimicrobial therapy with IV Vancomycin for treatment of suspected skin & soft tissue infection    Drug Allergies:   Review of patient's allergies indicates:  No Known Allergies    Actual Body Weight:   113.4kg    Renal Function:   Estimated Creatinine Clearance: 96.9 mL/min (based on SCr of 1.07 mg/dL).,     Dialysis Method (if applicable):  N/A    CBC (last 72 hours):  Recent Labs   Lab Result Units 04/10/24  2308   WBC x10(3)/mcL 10.81   Hgb g/dL 14.7   Hct % 43.4   Platelet x10(3)/mcL 297   Mono % % 9.9   Eos % % 2.7   Basophil % % 0.6       Metabolic Panel (last 72 hours):  Recent Labs   Lab Result Units 04/10/24  2308   Sodium Level mmol/L 136   Potassium Level mmol/L 4.0   Chloride mmol/L 101   Carbon Dioxide mmol/L 23   Glucose Level mg/dL 112   Blood Urea Nitrogen mg/dL 12.7   Creatinine mg/dL 1.07   Albumin Level g/dL 3.5   Bilirubin Total mg/dL 1.0   Alkaline Phosphatase unit/L 62   Aspartate Aminotransferase unit/L 27   Alanine Aminotransferase unit/L 32       Drug levels (last 3 results):  No results for input(s): "VANCOMYCINRA", "VANCORANDOM", "VANCOMYCINPE", "VANCOPEAK", "VANCOMYCINTR", "VANCOTROUGH" in the last 72 hours.    Microbiologic Results:  Microbiology Results (last 7 days)       Procedure Component Value Units Date/Time    Blood culture #1 **CANNOT BE ORDERED STAT** " [[id number]] Collected: 04/10/24 2308    Order Status: Resulted Specimen: Blood Updated: 04/10/24 2322    Blood culture #2 **CANNOT BE ORDERED STAT** [[id number]] Collected: 04/10/24 2308    Order Status: Resulted Specimen: Blood Updated: 04/10/24 2322

## 2024-04-11 NOTE — ED NOTES
Lobby Round. Pt alert and oriented at this time. Pt complaining of left lower extremity swelling and redness x3 days. Vitals assessed. Pt has noted significant swelling, redness, to extremity. Pt reports weeping around ankle and into shoe. Pt foot is bandaged at this time. Pain rated per pt 8/10 on pain scale while standing and 5/10 while sitting. Pt states he had a wound on bottom of foot over last several days that has progressively gotten worse. Pt denies any other complaints at this time.
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
## 2024-04-12 LAB
ALBUMIN SERPL-MCNC: 2.8 G/DL (ref 3.4–4.8)
ALBUMIN/GLOB SERPL: 0.9 RATIO (ref 1.1–2)
ALP SERPL-CCNC: 51 UNIT/L (ref 40–150)
ALT SERPL-CCNC: 21 UNIT/L (ref 0–55)
AST SERPL-CCNC: 19 UNIT/L (ref 5–34)
BASOPHILS # BLD AUTO: 0.06 X10(3)/MCL
BASOPHILS NFR BLD AUTO: 0.8 %
BILIRUB SERPL-MCNC: 0.7 MG/DL
BUN SERPL-MCNC: 11.3 MG/DL (ref 8.4–25.7)
CALCIUM SERPL-MCNC: 8.4 MG/DL (ref 8.8–10)
CHLORIDE SERPL-SCNC: 102 MMOL/L (ref 98–107)
CO2 SERPL-SCNC: 26 MMOL/L (ref 23–31)
CREAT SERPL-MCNC: 1.09 MG/DL (ref 0.73–1.18)
EOSINOPHIL # BLD AUTO: 0.42 X10(3)/MCL (ref 0–0.9)
EOSINOPHIL NFR BLD AUTO: 5.4 %
ERYTHROCYTE [DISTWIDTH] IN BLOOD BY AUTOMATED COUNT: 12.9 % (ref 11.5–17)
GFR SERPLBLD CREATININE-BSD FMLA CKD-EPI: >60 MLS/MIN/1.73/M2
GLOBULIN SER-MCNC: 3.1 GM/DL (ref 2.4–3.5)
GLUCOSE SERPL-MCNC: 134 MG/DL (ref 82–115)
HCT VFR BLD AUTO: 38.7 % (ref 42–52)
HGB BLD-MCNC: 12.8 G/DL (ref 14–18)
IMM GRANULOCYTES # BLD AUTO: 0.04 X10(3)/MCL (ref 0–0.04)
IMM GRANULOCYTES NFR BLD AUTO: 0.5 %
LYMPHOCYTES # BLD AUTO: 1.11 X10(3)/MCL (ref 0.6–4.6)
LYMPHOCYTES NFR BLD AUTO: 14.2 %
MCH RBC QN AUTO: 32.5 PG (ref 27–31)
MCHC RBC AUTO-ENTMCNC: 33.1 G/DL (ref 33–36)
MCV RBC AUTO: 98.2 FL (ref 80–94)
MONOCYTES # BLD AUTO: 0.78 X10(3)/MCL (ref 0.1–1.3)
MONOCYTES NFR BLD AUTO: 10 %
NEUTROPHILS # BLD AUTO: 5.41 X10(3)/MCL (ref 2.1–9.2)
NEUTROPHILS NFR BLD AUTO: 69.1 %
NRBC BLD AUTO-RTO: 0 %
PLATELET # BLD AUTO: 255 X10(3)/MCL (ref 130–400)
PMV BLD AUTO: 8.6 FL (ref 7.4–10.4)
POTASSIUM SERPL-SCNC: 4.5 MMOL/L (ref 3.5–5.1)
PROT SERPL-MCNC: 5.9 GM/DL (ref 5.8–7.6)
RBC # BLD AUTO: 3.94 X10(6)/MCL (ref 4.7–6.1)
SODIUM SERPL-SCNC: 137 MMOL/L (ref 136–145)
VANCOMYCIN TROUGH SERPL-MCNC: 18.4 UG/ML (ref 15–20)
WBC # SPEC AUTO: 7.82 X10(3)/MCL (ref 4.5–11.5)

## 2024-04-12 PROCEDURE — 80202 ASSAY OF VANCOMYCIN: CPT | Performed by: STUDENT IN AN ORGANIZED HEALTH CARE EDUCATION/TRAINING PROGRAM

## 2024-04-12 PROCEDURE — 25000003 PHARM REV CODE 250: Performed by: PHYSICIAN ASSISTANT

## 2024-04-12 PROCEDURE — 85025 COMPLETE CBC W/AUTO DIFF WBC: CPT | Performed by: PHYSICIAN ASSISTANT

## 2024-04-12 PROCEDURE — 80053 COMPREHEN METABOLIC PANEL: CPT | Performed by: PHYSICIAN ASSISTANT

## 2024-04-12 PROCEDURE — 25000003 PHARM REV CODE 250: Performed by: NURSE PRACTITIONER

## 2024-04-12 PROCEDURE — 63600175 PHARM REV CODE 636 W HCPCS: Performed by: PHYSICIAN ASSISTANT

## 2024-04-12 PROCEDURE — 11000001 HC ACUTE MED/SURG PRIVATE ROOM

## 2024-04-12 PROCEDURE — 25000003 PHARM REV CODE 250: Performed by: INTERNAL MEDICINE

## 2024-04-12 PROCEDURE — 63600175 PHARM REV CODE 636 W HCPCS: Performed by: NURSE PRACTITIONER

## 2024-04-12 RX ORDER — LISINOPRIL 20 MG/1
20 TABLET ORAL DAILY
Status: DISCONTINUED | OUTPATIENT
Start: 2024-04-12 | End: 2024-04-15 | Stop reason: HOSPADM

## 2024-04-12 RX ADMIN — ENOXAPARIN SODIUM 40 MG: 40 INJECTION SUBCUTANEOUS at 05:04

## 2024-04-12 RX ADMIN — DEXTROSE MONOHYDRATE 1750 MG: 5 INJECTION, SOLUTION INTRAVENOUS at 06:04

## 2024-04-12 RX ADMIN — LISINOPRIL 20 MG: 20 TABLET ORAL at 09:04

## 2024-04-12 RX ADMIN — HYDROCODONE BITARTRATE AND ACETAMINOPHEN 1 TABLET: 7.5; 325 TABLET ORAL at 05:04

## 2024-04-12 RX ADMIN — HYDROCODONE BITARTRATE AND ACETAMINOPHEN 1 TABLET: 7.5; 325 TABLET ORAL at 06:04

## 2024-04-12 RX ADMIN — PIPERACILLIN SODIUM AND TAZOBACTAM SODIUM 4.5 G: 4; .5 INJECTION, POWDER, LYOPHILIZED, FOR SOLUTION INTRAVENOUS at 04:04

## 2024-04-12 RX ADMIN — PIPERACILLIN SODIUM AND TAZOBACTAM SODIUM 4.5 G: 4; .5 INJECTION, POWDER, LYOPHILIZED, FOR SOLUTION INTRAVENOUS at 01:04

## 2024-04-12 RX ADMIN — PIPERACILLIN SODIUM AND TAZOBACTAM SODIUM 4.5 G: 4; .5 INJECTION, POWDER, LYOPHILIZED, FOR SOLUTION INTRAVENOUS at 08:04

## 2024-04-12 NOTE — PLAN OF CARE
Problem: Adult Inpatient Plan of Care  Goal: Plan of Care Review  Outcome: Ongoing, Progressing  Flowsheets (Taken 4/12/2024 0305)  Plan of Care Reviewed With: patient  Goal: Absence of Hospital-Acquired Illness or Injury  Outcome: Ongoing, Progressing  Goal: Optimal Comfort and Wellbeing  Outcome: Ongoing, Progressing     Problem: Fall Injury Risk  Goal: Absence of Fall and Fall-Related Injury  Outcome: Ongoing, Progressing     Problem: Pain Acute  Goal: Acceptable Pain Control and Functional Ability  Outcome: Ongoing, Progressing
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
## 2024-04-12 NOTE — PLAN OF CARE
Pt is homelss, his sister was present in the room  [place] [phone].  He is looking for a placement, he may end up with his sister, as well

## 2024-04-12 NOTE — PROGRESS NOTES
Pharmacokinetic Assessment Follow Up: IV Vancomycin    Vancomycin serum concentration assessment(s):    The trough level was drawn correctly and can be used to guide therapy at this time. The measurement is within the desired definitive target range of 10 to 20 mcg/mL.    Vancomycin Regimen Plan:    Continue 1750 mg q12h.  Check trough on 4/14 at 1700.    Drug levels (last 3 results):  Recent Labs   Lab Result Units 04/12/24  1631   Vancomycin Trough ug/ml 18.4        Patient brief summary:  Kamaljit Frias is a 58 y.o. male initiated on antimicrobial therapy with IV Vancomycin for treatment of skin & soft tissue infection      Drug Allergies:   Review of patient's allergies indicates:  No Known Allergies    Actual Body Weight:   113.4 kg    Renal Function:   Estimated Creatinine Clearance: 95.1 mL/min (based on SCr of 1.09 mg/dL).,     Dialysis Method (if applicable):  N/A    CBC (last 72 hours):  Recent Labs   Lab Result Units 04/10/24  2308 04/11/24  0529 04/12/24  0528   WBC x10(3)/mcL 10.81 9.92 7.82   Hgb g/dL 14.7 12.7* 12.8*   Hct % 43.4 38.2* 38.7*   Platelet x10(3)/mcL 297 259 255   Mono % % 9.9 9.7 10.0   Eos % % 2.7 2.5 5.4   Basophil % % 0.6 0.5 0.8       Metabolic Panel (last 72 hours):  Recent Labs   Lab Result Units 04/10/24  2308 04/11/24  0529 04/12/24  0528   Sodium Level mmol/L 136 136 137   Potassium Level mmol/L 4.0 4.1 4.5   Chloride mmol/L 101 103 102   Carbon Dioxide mmol/L 23 24 26   Glucose Level mg/dL 112 159* 134*   Blood Urea Nitrogen mg/dL 12.7 13.4 11.3   Creatinine mg/dL 1.07 0.95 1.09   Albumin Level g/dL 3.5 3.0* 2.8*   Bilirubin Total mg/dL 1.0 0.9 0.7   Alkaline Phosphatase unit/L 62 51 51   Aspartate Aminotransferase unit/L 27 21 19   Alanine Aminotransferase unit/L 32 26 21   Magnesium Level mg/dL  --  2.00  --    Phosphorus Level mg/dL  --  3.6  --        Vancomycin Administrations:  vancomycin given in the last 96 hours                     vancomycin 1.75 g in 5 % dextrose 500  mL IVPB (mg) 1,750 mg New Bag 04/12/24 0619     1,750 mg New Bag 04/11/24 1640    vancomycin (VANCOCIN) 2,500 mg in dextrose 5 % (D5W) 500 mL IVPB (mg) 2,500 mg New Bag 04/11/24 0432                    Microbiologic Results:  Microbiology Results (last 7 days)       Procedure Component Value Units Date/Time    Wound Culture [[id number]] Collected: 04/11/24 1538    Order Status: Completed Specimen: Wound from Foot, Heel Left Updated: 04/12/24 0707     Wound Culture No Growth At 24 Hours    Blood culture #1 **CANNOT BE ORDERED STAT** [[id number]]  (Normal) Collected: 04/10/24 2308    Order Status: Completed Specimen: Blood Updated: 04/12/24 0300     CULTURE, BLOOD ([facility]) No Growth At 24 Hours    Blood culture #2 **CANNOT BE ORDERED STAT** [[id number]]  (Normal) Collected: 04/10/24 2308    Order Status: Completed Specimen: Blood Updated: 04/12/24 0300     CULTURE, BLOOD ([facility]) No Growth At 24 Hours

## 2024-04-12 NOTE — PROGRESS NOTES
[facility]                       [address] [place] [address]    PATIENT NAME:       JULISSA SCOTT   YOB: 1965  CSN:                [id number]   MRN:                [id number]  ADMIT DATE:         04/10/2024 17:31:00  PHYSICIAN:          Get Enriquez DPM                            PROGRESS NOTE    DATE:  04/12/2024 00:00:00    SUBJECTIVE:  The patient is seen today.  He is doing much better.  Tolerating   the antibiotics.  No GI complaints.    PHYSICAL EXAMINATION:  VITAL SIGNS:  Stable and afebrile.    Edema is markedly improved.  Erythema diminished, significantly improved from   marked off areas.  Wound is clean and viable.  No pus.  No odor.    ASSESSMENT:  Left heel wound infection with secondary cellulitis.    PLAN:  Continue with current care.  Labs reviewed.  White cell counts remain   normalized at 7.8.  Cultures are pending.  Dressings were changed.  Continue   nonweightbearing on the foot.        ______________________________  Get Enriquez DPM    GAS/AQS  DD:  04/12/2024  Time:  05:20PM  DT:  04/12/2024  Time:  06:46PM  Job #:  [id number]      PROGRESS NOTE

## 2024-04-13 PROCEDURE — 25000003 PHARM REV CODE 250: Performed by: NURSE PRACTITIONER

## 2024-04-13 PROCEDURE — 63600175 PHARM REV CODE 636 W HCPCS: Performed by: NURSE PRACTITIONER

## 2024-04-13 PROCEDURE — 63600175 PHARM REV CODE 636 W HCPCS: Performed by: PHYSICIAN ASSISTANT

## 2024-04-13 PROCEDURE — 11000001 HC ACUTE MED/SURG PRIVATE ROOM

## 2024-04-13 PROCEDURE — 25000003 PHARM REV CODE 250: Performed by: INTERNAL MEDICINE

## 2024-04-13 PROCEDURE — 25000003 PHARM REV CODE 250: Performed by: PHYSICIAN ASSISTANT

## 2024-04-13 PROCEDURE — 97162 PT EVAL MOD COMPLEX 30 MIN: CPT

## 2024-04-13 RX ADMIN — HYDROCODONE BITARTRATE AND ACETAMINOPHEN 1 TABLET: 7.5; 325 TABLET ORAL at 12:04

## 2024-04-13 RX ADMIN — DEXTROSE MONOHYDRATE 1750 MG: 5 INJECTION, SOLUTION INTRAVENOUS at 06:04

## 2024-04-13 RX ADMIN — HYDROCODONE BITARTRATE AND ACETAMINOPHEN 1 TABLET: 7.5; 325 TABLET ORAL at 06:04

## 2024-04-13 RX ADMIN — PIPERACILLIN SODIUM AND TAZOBACTAM SODIUM 4.5 G: 4; .5 INJECTION, POWDER, LYOPHILIZED, FOR SOLUTION INTRAVENOUS at 03:04

## 2024-04-13 RX ADMIN — DEXTROSE MONOHYDRATE 1750 MG: 5 INJECTION, SOLUTION INTRAVENOUS at 05:04

## 2024-04-13 RX ADMIN — LISINOPRIL 20 MG: 20 TABLET ORAL at 09:04

## 2024-04-13 RX ADMIN — ENOXAPARIN SODIUM 40 MG: 40 INJECTION SUBCUTANEOUS at 05:04

## 2024-04-13 RX ADMIN — PIPERACILLIN SODIUM AND TAZOBACTAM SODIUM 4.5 G: 4; .5 INJECTION, POWDER, LYOPHILIZED, FOR SOLUTION INTRAVENOUS at 09:04

## 2024-04-13 RX ADMIN — HYDROCODONE BITARTRATE AND ACETAMINOPHEN 1 TABLET: 7.5; 325 TABLET ORAL at 08:04

## 2024-04-13 RX ADMIN — PIPERACILLIN SODIUM AND TAZOBACTAM SODIUM 4.5 G: 4; .5 INJECTION, POWDER, LYOPHILIZED, FOR SOLUTION INTRAVENOUS at 12:04

## 2024-04-13 NOTE — PROGRESS NOTES
[facility] Hospital Medicine Progress Note        Chief Complaint: Inpatient Follow-up for left heel wound with cellulitis    HPI per admitting team: Kamaljit Frias is a 58 y.o. 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 The patient presented to [facility] on 4/10/2024 with a primary complaint of  left lower extremity swelling and redness with wound left foot with drainage.  He reports noticing a blister to left heel paroxysmally week ago which popped on its own.  Reports having bloody drainage but for the last 2-3 days symptoms worsened prompting him to present to his primary care provider yesterday (4/10/2024) where it was recommended he present to the ED for admission.  Patient reports drinking alcohol since he has been living out of his car but does not quantify how often or how much.  He does report his last drink was a few days ago.  Upon presentation to the ED, temperature 100.1° F, heart rate 120, blood pressure 167/87, respiratory rate 22 and SpO2 95% on room air.  Labs significant for WBC 10.81.  Venous ultrasound of left lower extremity with subcutaneous edema located to the proximal calf and distal calf vein but no DVT noted.  He received Tylenol, Dilaudid, Toradol, Zofran, Zosyn and vancomycin.  Patient is admitted to hospital medicine services for further medical management.    Interval Hx:   Patient is working with therapy this morning, walking with crutches in learning the new ways.  Reports he feels good.  No family is at bedside  Case was discussed with patient's nurse on the floor.    Objective/physical exam:  General: In no acute distress, afebrile, learning to walk with crutches  Chest: Clear to auscultation bilaterally anteriorly  Heart: RRR, +S1, S2, no appreciable murmur  Abdomen: Soft, nontender, BS  +  MSK: Warm, trace pitting edema bilateral lower extremity   Skin:  Redness has markedly regressed from the markings.  Dressing on left foot/heel area    Neurologic: Alert and oriented x4, Cranial nerve II-XII intact, Strength 5/5 in all 4 extremities    VITAL SIGNS: 24 HRS MIN & MAX LAST   Temp  Min: 98.2 °F (36.8 °C)  Max: 99 °F (37.2 °C) 98.6 °F (37 °C)   BP  Min: 126/93  Max: 143/87 137/85   Pulse  Min: 76  Max: 87  76   Resp  Min: 18  Max: 20 18   SpO2  Min: 91 %  Max: 96 % (!) 91 %     I reviewed the labs below:  Recent Labs   Lab 04/10/24  2308 04/11/24  0529 04/12/24  0528   WBC 10.81 9.92 7.82   RBC 4.46* 3.92* 3.94*   HGB 14.7 12.7* 12.8*   HCT 43.4 38.2* 38.7*   MCV 97.3* 97.4* 98.2*   MCH 33.0* 32.4* 32.5*   MCHC 33.9 33.2 33.1   RDW 13.0 12.8 12.9    259 255   MPV 8.8 8.8 8.6     Recent Labs   Lab 04/10/24  2308 04/11/24  0529 04/12/24  0528    136 137   K 4.0 4.1 4.5   CO2 23 24 26   BUN 12.7 13.4 11.3   CREATININE 1.07 0.95 1.09   CALCIUM 9.4 8.0* 8.4*   MG  --  2.00  --    ALBUMIN 3.5 3.0* 2.8*   ALKPHOS 62 51 51   ALT 32 26 21   AST 27 21 19   BILITOT 1.0 0.9 0.7     Microbiology Results (last 7 days)       Procedure Component Value Units Date/Time    Wound Culture [[id number]]  (Abnormal) Collected: 04/11/24 1538    Order Status: Completed Specimen: Wound from Foot, Heel Left Updated: 04/13/24 1132     Wound Culture Moderate Gram-negative Rods     Comment: with normal skin missy       Blood culture #1 **CANNOT BE ORDERED STAT** [[id number]]  (Normal) Collected: 04/10/24 2308    Order Status: Completed Specimen: Blood Updated: 04/13/24 0300     CULTURE, BLOOD ([facility]) No Growth At 48 Hours    Blood culture #2 **CANNOT BE ORDERED STAT** [[id number]]  (Normal) Collected: 04/10/24 2308    Order Status: Completed Specimen: Blood Updated: 04/13/24 0300     CULTURE, BLOOD ([facility]) No Growth At 48 Hours           See below for Radiology    Current Med:  Current Facility-Administered Medications    Medication Dose Route Frequency Provider Last Rate Last Admin    acetaminophen tablet 650 mg  650 mg Oral Q8H PRN Ailyn Morgan PA-C   650 mg at 04/11/24 1145    acetaminophen tablet 650 mg  650 mg Oral Q4H PRN Ailyn Morgan PA-EVELYNE        dextrose 10% bolus 125 mL 125 mL  12.5 g Intravenous PRN Ailyn Morgan, PA-EVELYNE        dextrose 10% bolus 250 mL 250 mL  25 g Intravenous PRN Ailyn Morgan PA-C        enoxaparin injection 40 mg  40 mg Subcutaneous Daily Ailyn Morgan PA-C   40 mg at 04/12/24 1700    glucagon (human recombinant) injection 1 mg  1 mg Intramuscular PRN Ailyn Morgan PA-C        glucose chewable tablet 16 g  16 g Oral PRN Ailyn Morgan, PA-EVELYNE        glucose chewable tablet 24 g  24 g Oral PRN Ailyn Morgan PA-EVELYNE        HYDROcodone-acetaminophen 7.5-325 mg per tablet 1 tablet  1 tablet Oral Q6H PRN Ailyn Morgan PA-EVELYNE   1 tablet at 04/13/24 1245    lisinopriL tablet 20 mg  20 mg Oral Daily Carlos Boudreaux MD   20 mg at 04/13/24 0902    ondansetron injection 4 mg  4 mg Intravenous Q4H PRN Ailyn Morgan PA-EVELYNE        piperacillin-tazobactam (ZOSYN) 4.5 g in dextrose 5 % in water (D5W) 100 mL IVPB (MB+)  4.5 g Intravenous Q8H Ree Shaw AGACNP-BC 25 mL/hr at 04/13/24 1249 4.5 g at 04/13/24 1249    sodium chloride 0.9% flush 10 mL  10 mL Intravenous Q12H PRN Ailyn Morgan PA-C        vancomycin - pharmacy to dose   Intravenous pharmacy to manage frequency Sho Heard DO        vancomycin 1.75 g in 5 % dextrose 500 mL IVPB  1,750 mg Intravenous Q12H Ree Shaw AGACNP-BC   Stopped at 04/13/24 0810      Intake and Output:    Intake/Output Summary (Last 24 hours) at 4/13/2024 1440  Last data filed at 4/13/2024 0622  Gross per 24 hour   Intake 1570 ml   Output 3075 ml   Net -1505 ml       Assessment/Plan:  Left heel wound with secondary cellulitis - POA  History of hypertension and neuropathy due to sciatic nerve damage from accident      Admitted  as inpatient on 04/11  Currently on vancomycin and Zosyn - day 3  No leukocytosis, afebrile  Blood cultures negative at 48 hours  Wound culture- moderate Gram-negative rods- identification and susceptibility pending, will wait for final cultures, if no staph, will discontinue vancomycin  Dr Enriquez on board, appreciate recs, recommended needs to be nonweightbearing on this extremity.  PT doing the weight training now  Xrays left foot without destructive changes or gas in the tissues.  No foreign bodies  Wound care on board, appreciate recommendations  Norco as needed for pain  Resumed appropriate home medication for chronic medical conditions  Morning CBC, BMP ordered    VTE prophylaxis:  Lovenox                       Patient condition:  Stable    Anticipated discharge and Disposition:   Awaiting final cultures, probably in the next 24-48 hours      All diagnosis and differential diagnosis have been reviewed; assessment and plan has been documented; I have personally reviewed the labs and test results that are presently available; I have reviewed the patients medication list; I have reviewed the consulting providers response and recommendations. I have reviewed or attempted to review medical records based upon their availability    All of the patient's questions have been  addressed and answered. Patient's is agreeable to the above stated plan. I will continue to monitor closely and make adjustments to medical management as needed.    Portions of this note dictated using EMR integrated voice recognition software, and may be subject to voice recognition errors not corrected at proofreading. Please contact writer for clarification if needed.   _____________________________________________________________________    Nutrition Status:  -  Radiology:   I have personally reviewed the images and agree with radiologist report  CV Ultrasound doppler venous DVT leg left  There was no evidence of deep or superficial vein  thrombosis in the left   lower extremity.  X-Ray Foot Complete Left  Narrative: EXAMINATION:  XR FOOT COMPLETE 3 VIEW LEFT    CLINICAL HISTORY:  Leg swelling.    TECHNIQUE:  Three views    COMPARISON:  None available.    FINDINGS:  There is previous amputation of the great toe.  There is also chronic deformity of distal aspect of the 2nd toe with corticated margins.  No acute lytic destructive changes identified.  No acute fracture or dislocation.  There is soft tissue inflammation.  Impression: No acute osseous abnormality identified.    Electronically signed by: Sandro Lomas  Date:    04/10/2024  Time:    18:28        Carlos Boudreaux MD  Department of Hospital Medicine   [facility]   04/13/2024

## 2024-04-13 NOTE — PROGRESS NOTES
[facility]                       [address] [address]    PATIENT NAME:       JULISSA SCOTT   YOB: 1965  CSN:                [id number]   MRN:                [id number]  ADMIT DATE:         04/10/2024 17:31:00  PHYSICIAN:          Get Enriquez DPM                            PROGRESS NOTE    DATE:  04/13/2024 00:00:00    SUBJECTIVE:  The patient is seen today.  He is doing well.  He is in good   spirits.  Not voicing any complaints.  No GI discomfort or diarrhea.  Eating   well.  No chest pain or shortness of breath.    PHYSICAL EXAMINATION:  VITAL SIGNS:  Stable and afebrile.    No labs posted for today.    Updates on cultures negative at 24 hours.  Nothing has been updated as yet for   today.    Edema markedly improved.  Erythema very little remaining up around the medial   ankle and heel area.  Wound is clean and viable.  Scant exudate.  No fluctuance,   crepitation, bogginess to the tissues.    ASSESSMENT:  Left heel wound infection and cellulitis, improving.    PLAN:  Continue with current care and strict offloading.  Dressings were   changed.  Await for updates on cultures.  Hopefully can discharge within the   next 24-48 hours.        ______________________________  Get Enriquez DPM    GAS/AQS  DD:  04/13/2024  Time:  10:29AM  DT:  04/13/2024  Time:  10:54AM  Job #:  [id number]      PROGRESS NOTE

## 2024-04-13 NOTE — PT/OT/SLP EVAL
Physical Therapy Evaluation    Patient Name:  Kamaljit Frias   MRN:  [id number]    Recommendations:     Discharge therapy intensity: No Therapy Indicated   Discharge Equipment Recommendations: shower chair, crutches, axillary   Barriers to discharge: Impaired mobility and Ongoing medical needs    Assessment:     Kamaljit Frias is a 58 y.o. male admitted with a medical diagnosis of LLE leg swelling x3 weeks.  He presents with the following impairments/functional limitations: impaired endurance, impaired functional mobility, gait instability, decreased lower extremity function, decreased safety awareness.    Pt very impulsive throughout evaluation. Pt currently homeless living out of his car however he states he will be going to live with his sister upon discharge who lives in a [facility] with 4 steps to enter w/ railing. He states his sister is retired so will be available around the clock to assist him. Pt Mod I supine <> sit, SBA STS w/ crutches, and CGA ambulation 150' w/ hop-to pattern. Pt needing frequent reminders of NWB precautions for LLE.     Rehab Prognosis: Good; patient would benefit from acute skilled PT services to address these deficits and reach maximum level of function.    Recent Surgery: * No surgery found *      Plan:     During this hospitalization, patient to be seen 5 x/week to address the identified rehab impairments via gait training, therapeutic activities, therapeutic exercises and progress toward the following goals:    Plan of Care Expires:  05/13/24    Subjective     Chief Complaint: SOB with ambulation  Patient/Family Comments/goals: none stated  Pain/Comfort:  Location - Side 1: Left  Location 1: foot  Pain Addressed 1: Reposition, Distraction    Patients cultural, spiritual, Restoration conflicts given the current situation: no    Living Environment:  Currently homeless. States will be going to stay with his sister upon d/c.  Prior to admission, patients level of function was independent.   Equipment used at home: none.  DME owned (not currently used): none.  Upon discharge, patient will have assistance from possibly sister.    Objective:     Communicated with nurse prior to session.  Patient found HOB elevated with peripheral IV, telemetry  upon PT entry to room.    General Precautions: Standard, fall  Orthopedic Precautions:LLE non weight bearing   Braces: N/A  Respiratory Status: Room air  Blood Pressure: NT      Exams:  RLE ROM: WNL  RLE Strength: WNL  LLE ROM: WNL except ankle impaired due to wound dressing  LLE Strength: WNL except ankle NT  Skin integrity:  L foot bandaged with gauze      Functional Mobility:  Bed Mobility:     Supine to Sit: independence  Sit to Supine: independence  Transfers:     Sit to Stand:  stand by assistance with axillary crutches  Gait: 150' w/ axillary crutches and CGA, hop-to pattern to maintain LLE NWB precautions. Pt needing short standi ng rest break midway due to fatigue/SOB.      AM-PAC 6 CLICK MOBILITY  Total Score:19       Treatment & Education:  Education Provided:  Role and goals of PT, transfer training, bed mobility, gait training, balance training, safety awareness, assistive device, strengthening exercises, and importance of participating in PT to return to PLOF.      Understanding was verbalized, however additional teaching warranted.     Patient left HOB elevated with all lines intact and call button in reach.    GOALS:   Multidisciplinary Problems       Physical Therapy Goals          Problem: Physical Therapy    Goal Priority Disciplines Outcome Goal Variances Interventions   Physical Therapy Goal     PT, PT/OT Ongoing, Progressing     Description: Goals to be met by: 24     Patient will increase functional independence with mobility by performin. Sit to stand transfer with Modified Meagher  2. Gait  x 200 feet with Modified Meagher using Crutches.   3. Ascend/descend 4 stair with right Handrails Stand-by Assistance using  Crutches.                          History:     Past Medical History:   Diagnosis Date    Essential (primary) hypertension     Neuropathy of lower extremity        Past Surgical History:   Procedure Laterality Date    COLOSTOMY  1981    Detachment at Left 1st Toe, Complete, Open Approach Left 03/13/2017    herniated disc repaired   2015    left femur break   2008    left hip replacement  02/02/2023    sciatic nerve repair  Left 1980       Time Tracking:     PT Received On: 04/13/24  PT Start Time: 0858     PT Stop Time: 0915  PT Total Time (min): 17 min     Billable Minutes: Evaluation 17      04/13/2024

## 2024-04-13 NOTE — PLAN OF CARE
Problem: Physical Therapy  Goal: Physical Therapy Goal  Description: Goals to be met by: 24     Patient will increase functional independence with mobility by performin. Sit to stand transfer with Modified Raymond  2. Gait  x 200 feet with Modified Raymond using Crutches.   3. Ascend/descend 4 stair with right Handrails Stand-by Assistance using Crutches.     Outcome: Ongoing, Progressing

## 2024-04-14 LAB
ALBUMIN SERPL-MCNC: 2.8 G/DL (ref 3.4–4.8)
ALBUMIN/GLOB SERPL: 0.7 RATIO (ref 1.1–2)
ALP SERPL-CCNC: 44 UNIT/L (ref 40–150)
ALT SERPL-CCNC: 20 UNIT/L (ref 0–55)
AST SERPL-CCNC: 21 UNIT/L (ref 5–34)
BILIRUB SERPL-MCNC: 0.6 MG/DL
BUN SERPL-MCNC: 11.4 MG/DL (ref 8.4–25.7)
CALCIUM SERPL-MCNC: 9.1 MG/DL (ref 8.8–10)
CHLORIDE SERPL-SCNC: 100 MMOL/L (ref 98–107)
CO2 SERPL-SCNC: 28 MMOL/L (ref 23–31)
CREAT SERPL-MCNC: 0.99 MG/DL (ref 0.73–1.18)
ERYTHROCYTE [DISTWIDTH] IN BLOOD BY AUTOMATED COUNT: 12.6 % (ref 11.5–17)
GFR SERPLBLD CREATININE-BSD FMLA CKD-EPI: >60 MLS/MIN/1.73/M2
GLOBULIN SER-MCNC: 4 GM/DL (ref 2.4–3.5)
GLUCOSE SERPL-MCNC: 122 MG/DL (ref 82–115)
HCT VFR BLD AUTO: 39.1 % (ref 42–52)
HGB BLD-MCNC: 13.4 G/DL (ref 14–18)
MCH RBC QN AUTO: 33.2 PG (ref 27–31)
MCHC RBC AUTO-ENTMCNC: 34.3 G/DL (ref 33–36)
MCV RBC AUTO: 96.8 FL (ref 80–94)
NRBC BLD AUTO-RTO: 0 %
PLATELET # BLD AUTO: 261 X10(3)/MCL (ref 130–400)
PMV BLD AUTO: 8.4 FL (ref 7.4–10.4)
POTASSIUM SERPL-SCNC: 4.2 MMOL/L (ref 3.5–5.1)
PROT SERPL-MCNC: 6.8 GM/DL (ref 5.8–7.6)
RBC # BLD AUTO: 4.04 X10(6)/MCL (ref 4.7–6.1)
SODIUM SERPL-SCNC: 137 MMOL/L (ref 136–145)
WBC # SPEC AUTO: 6.91 X10(3)/MCL (ref 4.5–11.5)

## 2024-04-14 PROCEDURE — 25000003 PHARM REV CODE 250: Performed by: INTERNAL MEDICINE

## 2024-04-14 PROCEDURE — 25000003 PHARM REV CODE 250: Performed by: PHYSICIAN ASSISTANT

## 2024-04-14 PROCEDURE — 25000003 PHARM REV CODE 250: Performed by: NURSE PRACTITIONER

## 2024-04-14 PROCEDURE — 80053 COMPREHEN METABOLIC PANEL: CPT | Performed by: INTERNAL MEDICINE

## 2024-04-14 PROCEDURE — 11000001 HC ACUTE MED/SURG PRIVATE ROOM

## 2024-04-14 PROCEDURE — 63600175 PHARM REV CODE 636 W HCPCS: Performed by: NURSE PRACTITIONER

## 2024-04-14 PROCEDURE — 85027 COMPLETE CBC AUTOMATED: CPT | Performed by: INTERNAL MEDICINE

## 2024-04-14 RX ADMIN — DEXTROSE MONOHYDRATE 1750 MG: 5 INJECTION, SOLUTION INTRAVENOUS at 10:04

## 2024-04-14 RX ADMIN — PIPERACILLIN SODIUM AND TAZOBACTAM SODIUM 4.5 G: 4; .5 INJECTION, POWDER, LYOPHILIZED, FOR SOLUTION INTRAVENOUS at 03:04

## 2024-04-14 RX ADMIN — HYDROCODONE BITARTRATE AND ACETAMINOPHEN 1 TABLET: 7.5; 325 TABLET ORAL at 12:04

## 2024-04-14 RX ADMIN — DEXTROSE MONOHYDRATE 1750 MG: 5 INJECTION, SOLUTION INTRAVENOUS at 11:04

## 2024-04-14 RX ADMIN — LISINOPRIL 20 MG: 20 TABLET ORAL at 09:04

## 2024-04-14 RX ADMIN — HYDROCODONE BITARTRATE AND ACETAMINOPHEN 1 TABLET: 7.5; 325 TABLET ORAL at 07:04

## 2024-04-14 RX ADMIN — HYDROCODONE BITARTRATE AND ACETAMINOPHEN 1 TABLET: 7.5; 325 TABLET ORAL at 05:04

## 2024-04-14 NOTE — PROGRESS NOTES
[facility]  Hospital Medicine Progress Note        Chief Complaint: Inpatient Follow-up for left heel wound with cellulitis    HPI per admitting team: Kamaljit Frias is a 58 y.o. 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 The patient presented to [facility] on 4/10/2024 with a primary complaint of  left lower extremity swelling and redness with wound left foot with drainage.  He reports noticing a blister to left heel paroxysmally week ago which popped on its own.  Reports having bloody drainage but for the last 2-3 days symptoms worsened prompting him to present to his primary care provider yesterday (4/10/2024) where it was recommended he present to the ED for admission.  Patient reports drinking alcohol since he has been living out of his car but does not quantify how often or how much.  He does report his last drink was a few days ago.  Upon presentation to the ED, temperature 100.1° F, heart rate 120, blood pressure 167/87, respiratory rate 22 and SpO2 95% on room air.  Labs significant for WBC 10.81.  Venous ultrasound of left lower extremity with subcutaneous edema located to the proximal calf and distal calf vein but no DVT noted.  He received Tylenol, Dilaudid, Toradol, Zofran, Zosyn and vancomycin.  Patient is admitted to hospital medicine services for further medical management.    Interval Hx:   Patient is sitting at the side of the bed, eating his breakfast.  Reports he feels much better.  Discussed that therapy recommended he does not need any training as he is doing well.  Patient reported  He wants to be discharged tomorrow because he will be living out of his car and he will visit his sister's home once a day to get the dressing done.  Stated he needs training with the food as well as straining to get up the  stairs  No family is at bedside  Case was discussed with patient's nurse on the floor.    Objective/physical exam:  General: In no acute distress, afebrile, sitting comfortably, feeling good  Chest: Clear to auscultation bilaterally anteriorly  Heart: RRR, +S1, S2, no appreciable murmur  Abdomen: Soft, nontender, BS +  MSK: Warm, trace pitting edema bilateral lower extremity   Skin:  Redness has markedly regressed from the markings.  Dressing on left foot/heel area    Neurologic: Alert and oriented x4, Cranial nerve II-XII intact, Strength 5/5 in all 4 extremities    VITAL SIGNS: 24 HRS MIN & MAX LAST   Temp  Min: 97.5 °F (36.4 °C)  Max: 99.2 °F (37.3 °C) 98.3 °F (36.8 °C)   BP  Min: 122/77  Max: 155/96 (!) 142/92   Pulse  Min: 77  Max: 89  89   Resp  Min: 18  Max: 20 18   SpO2  Min: 92 %  Max: 96 % 95 %     I reviewed the labs below:  Recent Labs   Lab 04/11/24 0529 04/12/24  0528 04/14/24  0401   WBC 9.92 7.82 6.91   RBC 3.92* 3.94* 4.04*   HGB 12.7* 12.8* 13.4*   HCT 38.2* 38.7* 39.1*   MCV 97.4* 98.2* 96.8*   MCH 32.4* 32.5* 33.2*   MCHC 33.2 33.1 34.3   RDW 12.8 12.9 12.6    255 261   MPV 8.8 8.6 8.4     Recent Labs   Lab 04/11/24 0529 04/12/24  0528 04/14/24  0401    137 137   K 4.1 4.5 4.2   CO2 24 26 28   BUN 13.4 11.3 11.4   CREATININE 0.95 1.09 0.99   CALCIUM 8.0* 8.4* 9.1   MG 2.00  --   --    ALBUMIN 3.0* 2.8* 2.8*   ALKPHOS 51 51 44   ALT 26 21 20   AST 21 19 21   BILITOT 0.9 0.7 0.6     Microbiology Results (last 7 days)       Procedure Component Value Units Date/Time    Wound Culture [[id number]]  (Abnormal) Collected: 04/11/24 1538    Order Status: Completed Specimen: Wound from Foot, Heel Left Updated: 04/14/24 0904     Wound Culture Moderate Gram-negative Rods     Comment: with normal skin missy         Rare Staphylococcus aureus    Blood culture #1 **CANNOT BE ORDERED STAT** [[id number]]  (Normal) Collected: 04/10/24 2308    Order Status: Completed Specimen: Blood Updated: 04/14/24  0301     CULTURE, BLOOD ([facility]) No Growth At 72 Hours    Blood culture #2 **CANNOT BE ORDERED STAT** [[id number]]  (Normal) Collected: 04/10/24 2308    Order Status: Completed Specimen: Blood Updated: 04/14/24 0301     CULTURE, BLOOD ([facility]) No Growth At 72 Hours           See below for Radiology    Current Med:  Current Facility-Administered Medications   Medication Dose Route Frequency Provider Last Rate Last Admin    acetaminophen tablet 650 mg  650 mg Oral Q8H PRN Ailyn Morgan, PA-C   650 mg at 04/11/24 1145    acetaminophen tablet 650 mg  650 mg Oral Q4H PRN Ailyn Morgan, PA-C        dextrose 10% bolus 125 mL 125 mL  12.5 g Intravenous PRN Ailyn Morgan, PA-C        dextrose 10% bolus 250 mL 250 mL  25 g Intravenous PRN Ailyn Morgan, PA-C        enoxaparin injection 40 mg  40 mg Subcutaneous Daily Ailyn Morgan PA-C   40 mg at 04/13/24 1727    glucagon (human recombinant) injection 1 mg  1 mg Intramuscular PRN Ailyn Morgan, PA-C        glucose chewable tablet 16 g  16 g Oral PRN Ailyn Morgan, PA-C        glucose chewable tablet 24 g  24 g Oral PRN Ailyn Morgan, PA-C        HYDROcodone-acetaminophen 7.5-325 mg per tablet 1 tablet  1 tablet Oral Q6H PRN Ailyn Morgan, PA-C   1 tablet at 04/14/24 1211    lisinopriL tablet 20 mg  20 mg Oral Daily Carlos Boudreaux MD   20 mg at 04/14/24 0941    ondansetron injection 4 mg  4 mg Intravenous Q4H PRN Ailyn Morgan, PA-C        piperacillin-tazobactam (ZOSYN) 4.5 g in dextrose 5 % in water (D5W) 100 mL IVPB (MB+)  4.5 g Intravenous Q8H Ree Shaw AGACNP-BC   Stopped at 04/14/24 0732    sodium chloride 0.9% flush 10 mL  10 mL Intravenous Q12H PRN Ailyn Morgan PA-C        vancomycin - pharmacy to dose   Intravenous pharmacy to manage frequency Sho Heard,         vancomycin 1.75 g in 5 % dextrose 500 mL IVPB  1,750 mg Intravenous Q12H Ree Shaw, BIBIANAP- mL/hr at 04/14/24 1101 1,750 mg at 04/14/24  1101      Intake and Output:    Intake/Output Summary (Last 24 hours) at 4/14/2024 1317  Last data filed at 4/14/2024 0640  Gross per 24 hour   Intake 1570 ml   Output 2950 ml   Net -1380 ml       Assessment/Plan:  Left heel wound with secondary cellulitis - POA  History of hypertension and neuropathy due to sciatic nerve damage from accident      Admitted as inpatient on 04/11  Currently on vancomycin and Zosyn - day 4  No leukocytosis, afebrile  Blood cultures negative at 72 hours  Wound culture- moderate Gram-negative rods and rare Staphylococcus aureus-final identification and susceptibility pending  Dr Enriquez on board, appreciate recs, recommended needs to be nonweightbearing on this extremity.  PT doing the weight training now  Xrays left foot without destructive changes or gas in the tissues.  No foreign bodies  Wound care on board, appreciate recommendations  Norco as needed for pain  Resumed appropriate home medication for chronic medical conditions  Morning lab stable    VTE prophylaxis:  Lovenox                       Patient condition:  Stable    Anticipated discharge and Disposition:   Awaiting final cultures, probably in the next 24 hours      All diagnosis and differential diagnosis have been reviewed; assessment and plan has been documented; I have personally reviewed the labs and test results that are presently available; I have reviewed the patients medication list; I have reviewed the consulting providers response and recommendations. I have reviewed or attempted to review medical records based upon their availability    All of the patient's questions have been  addressed and answered. Patient's is agreeable to the above stated plan. I will continue to monitor closely and make adjustments to medical management as needed.    Portions of this note dictated using EMR integrated voice recognition software, and may be subject to voice recognition errors not corrected at proofreading. Please contact  writer for clarification if needed.   _____________________________________________________________________    Nutrition Status:  -  Radiology:   I have personally reviewed the images and agree with radiologist report  CV Ultrasound doppler venous DVT leg left  There was no evidence of deep or superficial vein thrombosis in the left   lower extremity.  X-Ray Foot Complete Left  Narrative: EXAMINATION:  XR FOOT COMPLETE 3 VIEW LEFT    CLINICAL HISTORY:  Leg swelling.    TECHNIQUE:  Three views    COMPARISON:  None available.    FINDINGS:  There is previous amputation of the great toe.  There is also chronic deformity of distal aspect of the 2nd toe with corticated margins.  No acute lytic destructive changes identified.  No acute fracture or dislocation.  There is soft tissue inflammation.  Impression: No acute osseous abnormality identified.    Electronically signed by: Sandro Lomas  Date:    04/10/2024  Time:    18:28        Carlos Boudreaux MD  Department of Hospital Medicine   [facility]   04/14/2024

## 2024-04-14 NOTE — PROGRESS NOTES
[facility]                       [address] [place] [address]    PATIENT NAME:       JULISSA SCOTT   YOB: 1965  CSN:                [id number]   MRN:                [id number]  ADMIT DATE:         04/10/2024 17:31:00  PHYSICIAN:          Get Enriquez DPM                            PROGRESS NOTE    DATE:  04/14/2024 00:00:00    SUBJECTIVE:  The patient is seen today.  He is doing well, not voicing any   complaints.  Remains on vancomycin and Zosyn.  No GI complaints.  No fevers or   chills.  No chest pain or shortness of breath.    PHYSICAL EXAMINATION:  Vital signs are stable.  He is currently afebrile.    LABORATORY DATA:  Reviewed.  White cell count today 6.9, H and H 13.4 and 39.1.    Wound cultures preliminary, but growing out both Staph aureus and gram-negative   karin.  Blood cultures negative.    Dressings are intact.  Wound is clean and granular plantar aspect of the heel.    Currently, some evidence of epithelialization at the margins.  Scant exudate.    Inflammatory changes to the leg, markedly improved.  Overall, edema is much   improved to both extremities.    ASSESSMENT:  Left heel wound infection cellulitis, improving.  Cultures pending.    PLAN:  Continue with current care.  Dressings changed.  Offload with ambulation.    Hopefully can discharge within next 24 hours.        ______________________________  Get Enriquez DPM    GAS/AQS  DD:  04/14/2024  Time:  09:46AM  DT:  04/14/2024  Time:  11:01AM  Job #:  [id number]      PROGRESS NOTE

## 2024-04-15 VITALS
HEIGHT: 73 IN | WEIGHT: 250 LBS | DIASTOLIC BLOOD PRESSURE: 84 MMHG | TEMPERATURE: 98 F | SYSTOLIC BLOOD PRESSURE: 136 MMHG | RESPIRATION RATE: 14 BRPM | HEART RATE: 101 BPM | BODY MASS INDEX: 33.13 KG/M2 | OXYGEN SATURATION: 92 %

## 2024-04-15 PROBLEM — L03.90 CELLULITIS: Status: ACTIVE | Noted: 2024-04-15

## 2024-04-15 PROBLEM — L08.9: Status: ACTIVE | Noted: 2024-04-15

## 2024-04-15 PROBLEM — S90.822A: Status: ACTIVE | Noted: 2024-04-15

## 2024-04-15 PROBLEM — L03.90 CELLULITIS: Status: RESOLVED | Noted: 2024-04-15 | Resolved: 2024-04-15

## 2024-04-15 LAB — VANCOMYCIN TROUGH SERPL-MCNC: 18.4 UG/ML (ref 15–20)

## 2024-04-15 PROCEDURE — 80202 ASSAY OF VANCOMYCIN: CPT | Performed by: INTERNAL MEDICINE

## 2024-04-15 PROCEDURE — 25000003 PHARM REV CODE 250: Performed by: INTERNAL MEDICINE

## 2024-04-15 PROCEDURE — 97116 GAIT TRAINING THERAPY: CPT | Mod: CQ

## 2024-04-15 PROCEDURE — 97110 THERAPEUTIC EXERCISES: CPT | Mod: CQ

## 2024-04-15 PROCEDURE — 25000003 PHARM REV CODE 250: Performed by: NURSE PRACTITIONER

## 2024-04-15 PROCEDURE — 25000003 PHARM REV CODE 250: Performed by: PHYSICIAN ASSISTANT

## 2024-04-15 PROCEDURE — 63600175 PHARM REV CODE 636 W HCPCS: Performed by: NURSE PRACTITIONER

## 2024-04-15 RX ORDER — DOXYCYCLINE 100 MG/1
100 CAPSULE ORAL 2 TIMES DAILY
Qty: 28 CAPSULE | Refills: 0 | Status: SHIPPED | OUTPATIENT
Start: 2024-04-15 | End: 2024-04-29

## 2024-04-15 RX ORDER — CIPROFLOXACIN 500 MG/1
500 TABLET ORAL EVERY 12 HOURS
Qty: 28 TABLET | Refills: 0 | Status: SHIPPED | OUTPATIENT
Start: 2024-04-15 | End: 2024-04-29

## 2024-04-15 RX ADMIN — HYDROCODONE BITARTRATE AND ACETAMINOPHEN 1 TABLET: 7.5; 325 TABLET ORAL at 09:04

## 2024-04-15 RX ADMIN — PIPERACILLIN SODIUM AND TAZOBACTAM SODIUM 4.5 G: 4; .5 INJECTION, POWDER, LYOPHILIZED, FOR SOLUTION INTRAVENOUS at 09:04

## 2024-04-15 RX ADMIN — LISINOPRIL 20 MG: 20 TABLET ORAL at 09:04

## 2024-04-15 RX ADMIN — PIPERACILLIN SODIUM AND TAZOBACTAM SODIUM 4.5 G: 4; .5 INJECTION, POWDER, LYOPHILIZED, FOR SOLUTION INTRAVENOUS at 12:04

## 2024-04-15 NOTE — DISCHARGE INSTRUCTIONS
Wound care instructions: Left plantar heel-Cleanse with Vashe. Apply vashe moistened mesalt to open wound bed, cover with gauze, abd, wrap with kerlix. Change daily and as needed.

## 2024-04-15 NOTE — PROGRESS NOTES
[facility]                       [address] [place] [address]    PATIENT NAME:       JULISSA SCOTT   YOB: 1965  CSN:                [id number]   MRN:                [id number]  ADMIT DATE:         04/10/2024 17:31:00  PHYSICIAN:          Get Enriquez DPM                            PROGRESS NOTE    DATE:  04/15/2024 00:00:00    SUBJECTIVE:  The patient was seen today, doing well.  He is getting ready to be   discharged today.  No reported fevers, chills.  No pain.  He tolerated all   treatments thus far.    PHYSICAL EXAMINATION:  VITAL SIGNS:  Stable and afebrile.    Dressings are intact.  Wound looks great.  Inflammatory changes pretty much   resolved and edema is back to baseline.  No fluctuance, crepitation, or   bogginess.  The feet and the legs are warm.  Some decreased anterior compartment   function on the left side.    Cultures grew out MRSA and Bordetella hinzii, which is being shipped out to   reference lab.    PLAN:  The patient instructed to find physical exam.  From my standpoint, the   patient is stable for discharge on doxycycline and Cipro to cover the Bordetella   gram-negative organism.  We will plan on seeing him back in about a week.  My   feeling is it is going to be reference out to [place], which will take at least a   week to get this back.  Continue with all other care.  Unfortunately, he is   being discharged and will be living in his car again.  His sister will be   helping to provide care.        ______________________________  Get Enriquez DPM    GAS/AQS  DD:  04/15/2024  Time:  02:46PM  DT:  04/15/2024  Time:  06:19PM  Job #:  [id number]      PROGRESS NOTE

## 2024-04-15 NOTE — PT/OT/SLP PROGRESS
Physical Therapy Treatment    Patient Name:  Kamaljit Frias   MRN:  [id number]    Recommendations:     Discharge therapy intensity: No Therapy Indicated   Discharge Equipment Recommendations: shower chair, crutches, axillary  Barriers to discharge: None    Assessment:     Kamaljit Frias is a 58 y.o. male.  He presents with the following impairments/functional limitations: impaired endurance, impaired functional mobility, gait instability, decreased lower extremity function, decreased safety awareness.    Rehab Prognosis: Good; patient would benefit from acute skilled PT services to address these deficits and reach maximum level of function.    Recent Surgery: * No surgery found *      Plan:     During this hospitalization, patient to be seen 5 x/week to address the identified rehab impairments via gait training, therapeutic activities, therapeutic exercises and progress toward the following goals:    Plan of Care Expires:  05/13/24    Subjective     Chief Complaint: none    Objective:     Communicated with nurse prior to session.  Patient found supine with   upon PT entry to room.     General Precautions: Standard, fall  Orthopedic Precautions: LLE non weight bearing  Braces: N/A  Respiratory Status: Room air  Blood Pressure:   Skin Integrity:  dressing to LLE      Functional Mobility:  Mod I with bed mobility and transfers  Gait 200 ft RW mod I  Pt performed LE PRE's to increase strenth, ROM, and endurance to improve overall independence.  Educated on donning and doffing pressure relief boots    Education Provided:  Role and goals of PT, transfer training, bed mobility, gait training, balance training, safety awareness, assistive device, strengthening exercises, and importance of participating in PT to return to PLOF.    Patient left sitting edge of bed with call button in reach    GOALS:   Multidisciplinary Problems       Physical Therapy Goals          Problem: Physical Therapy    Goal Priority Disciplines Outcome  Goal Variances Interventions   Physical Therapy Goal     PT, PT/OT Ongoing, Progressing     Description: Goals to be met by: 24     Patient will increase functional independence with mobility by performin. Sit to stand transfer with Modified [place]  2. Gait  x 200 feet with Modified [place] using Crutches.   3. Ascend/descend 4 stair with right Handrails Stand-by Assistance using Crutches.                          Time Tracking:       Billable Minutes: Gait Training 13 and Therapeutic Exercise 12    Treatment Type: Treatment  PT/PTA: PTA     Number of PTA visits since last PT visit: 1     04/15/2024

## 2024-04-15 NOTE — PROGRESS NOTES
Pharmacokinetic Assessment Follow Up: IV Vancomycin    Vancomycin serum concentration assessment(s):    The trough level was drawn correctly and can be used to guide therapy at this time. The measurement is within the desired definitive target range of 10 to 20 mcg/mL.    Vancomycin Regimen Plan:    Continue regimen to Vancomycin 1750 mg IV every 12 hours with next serum trough concentration measured at 1000 prior to   dose on 04/17.    Drug levels (last 3 results):  Recent Labs   Lab Result Units 04/12/24  1631 04/15/24  0845   Vancomycin Trough ug/ml 18.4 18.4       Pharmacy will continue to follow and monitor vancomycin.    Please contact pharmacy at extension [phone] for questions regarding this assessment.    Thank you for the consult,   Kaley Kumar       Patient brief summary:  Kamaljit Frias is a 58 y.o. male initiated on antimicrobial therapy with IV Vancomycin for treatment of skin & soft tissue infection    The patient's current regimen is 1750mg q12h.    Drug Allergies:   Review of patient's allergies indicates:  No Known Allergies    Actual Body Weight:   113.4kg    Renal Function:   Estimated Creatinine Clearance: 104.7 mL/min (based on SCr of 0.99 mg/dL).,     Dialysis Method (if applicable):  N/A    CBC (last 72 hours):  Recent Labs   Lab Result Units 04/14/24  0401   WBC x10(3)/mcL 6.91   Hgb g/dL 13.4*   Hct % 39.1*   Platelet x10(3)/mcL 261       Metabolic Panel (last 72 hours):  Recent Labs   Lab Result Units 04/14/24  0401   Sodium Level mmol/L 137   Potassium Level mmol/L 4.2   Chloride mmol/L 100   Carbon Dioxide mmol/L 28   Glucose Level mg/dL 122*   Blood Urea Nitrogen mg/dL 11.4   Creatinine mg/dL 0.99   Albumin Level g/dL 2.8*   Bilirubin Total mg/dL 0.6   Alkaline Phosphatase unit/L 44   Aspartate Aminotransferase unit/L 21   Alanine Aminotransferase unit/L 20       Vancomycin Administrations:  vancomycin given in the last 96 hours                     vancomycin 1.75 g in 5 % dextrose 500  mL IVPB (mg) 1,750 mg New Bag 04/14/24 2214     1,750 mg New Bag  1101     1,750 mg New Bag 04/13/24 1727     1,750 mg New Bag  0610     1,750 mg New Bag 04/12/24 1829     1,750 mg New Bag  0619     1,750 mg New Bag 04/11/24 1640                    Microbiologic Results:  Microbiology Results (last 7 days)       Procedure Component Value Units Date/Time    Wound Culture [[id number]]  (Abnormal)  (Susceptibility) Collected: 04/11/24 1538    Order Status: Completed Specimen: Wound from Foot, Heel Left Updated: 04/15/24 0834     Wound Culture Moderate Bordetella rossyi     Comment: with normal skin missy         Rare Methicillin Sensitive Staphylococcus aureus    Blood culture #1 **CANNOT BE ORDERED STAT** [[id number]]  (Normal) Collected: 04/10/24 2308    Order Status: Completed Specimen: Blood Updated: 04/15/24 0300     CULTURE, BLOOD ([facility]) No Growth At 96 Hours    Blood culture #2 **CANNOT BE ORDERED STAT** [[id number]]  (Normal) Collected: 04/10/24 2308    Order Status: Completed Specimen: Blood Updated: 04/15/24 0300     CULTURE, BLOOD ([facility]) No Growth At 96 Hours

## 2024-04-15 NOTE — DISCHARGE SUMMARY
[facility] Hospital Medicine Discharge Summary    Admit Date: 4/10/2024  Discharge Date and Time: 4/15/30586:44 AM  Admitting Physician: YAAKOV Team  Discharging Physician: Carlos Boudreaux MD.  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 The patient presented to [facility] on 4/10/2024 with a primary complaint of  left lower extremity swelling and redness with wound left foot with drainage.  He reports noticing a blister to left heel paroxysmally week ago which popped on its own.  Reports having bloody drainage but for the last 2-3 days symptoms worsened prompting him to present to his primary care provider yesterday (4/10/2024) where it was recommended he present to the ED for admission.  Patient reports drinking alcohol since he has been living out of his car but does not quantify how often or how much.  He does report his last drink was a few days ago.  Upon presentation to the ED, temperature 100.1° F, heart rate 120, blood pressure 167/87, respiratory rate 22 and SpO2 95% on room air.  Labs significant for WBC 10.81.  Venous ultrasound of left lower extremity with subcutaneous edema located to the proximal calf and distal calf vein but no DVT noted.  He received Tylenol, Dilaudid, Toradol, Zofran, Zosyn and vancomycin.  Patient is admitted to hospital medicine services for further medical management.  Admitted as inpatient on 04/11  Currently on vancomycin and Zosyn - day 4  No leukocytosis, afebrile  Blood cultures negative at 72 hours  Wound culture- moderate  Bordetella Henzii and rare MSSA.  Case personally discussed with Dr. Enriquez, recommended Cipro and doxy for 14 days.  Dr Enriquez recommended needs to be nonweightbearing on this extremity.  PT doing the weight training now  Xrays left foot without destructive changes or gas in the tissues.  No foreign bodies  Wound care on board, appreciate recommendations  Dr. Genao be cleared patient for discharge home.  Prescription sent to his primary pharmacy.  Wound care instructions: Left plantar heel-Cleanse with Vashe. Apply vashe moistened mesalt to open wound bed, cover with gauze, abd, wrap with kerlix. Change daily and as needed.    Pt was seen and examined on the day of discharge  Vitals:  VITAL SIGNS: 24 HRS MIN & MAX LAST   Temp  Min: 98 °F (36.7 °C)  Max: 99.3 °F (37.4 °C) 98.8 °F (37.1 °C)   BP  Min: 112/73  Max: 144/97 130/89   Pulse  Min: 80  Max: 89  89   Resp  Min: 14  Max: 18 14   SpO2  Min: 91 %  Max: 95 % (!) 92 %       Physical Exam:  General: In no acute distress, afebrile, sitting comfortably, feeling good  Chest: Clear to auscultation bilaterally anteriorly  Heart: RRR, +S1, S2, no appreciable murmur  Abdomen: Soft, nontender, BS +  MSK: Warm, trace pitting edema bilateral lower extremity   Skin:  Redness has markedly regressed from the markings.  Dressing on left foot/heel area    Neurologic: Alert and oriented x4, Cranial nerve II-XII intact, Strength 5/5 in all 4 extremities    Recent Labs   Lab 04/11/24  0529 04/12/24  0528 04/14/24  0401   WBC 9.92 7.82 6.91   RBC 3.92* 3.94* 4.04*   HGB 12.7* 12.8* 13.4*   HCT 38.2* 38.7* 39.1*   MCV 97.4* 98.2* 96.8*   MCH 32.4* 32.5* 33.2*   MCHC 33.2 33.1 34.3   RDW 12.8 12.9 12.6    255 261   MPV 8.8 8.6 8.4       Recent Labs   Lab 04/11/24  0529 04/12/24  0528 04/14/24  0401    137 137   K 4.1 4.5 4.2   CO2 24 26 28   BUN 13.4 11.3 11.4   CREATININE 0.95 1.09 0.99   CALCIUM 8.0* 8.4* 9.1   MG 2.00  --   --    ALBUMIN 3.0* 2.8* 2.8*   ALKPHOS 51 51  44   ALT 26 21 20   AST 21 19 21   BILITOT 0.9 0.7 0.6        Microbiology Results (last 7 days)       Procedure Component Value Units Date/Time    Wound Culture [[id number]]  (Abnormal)  (Susceptibility) Collected: 04/11/24 1538    Order Status: Completed Specimen: Wound from Foot, Heel Left Updated: 04/15/24 0834     Wound Culture Moderate Bordetella rossyi     Comment: with normal skin missy         Rare Methicillin Sensitive Staphylococcus aureus    Blood culture #1 **CANNOT BE ORDERED STAT** [[id number]]  (Normal) Collected: 04/10/24 2308    Order Status: Completed Specimen: Blood Updated: 04/15/24 0300     CULTURE, BLOOD ([facility]) No Growth At 96 Hours    Blood culture #2 **CANNOT BE ORDERED STAT** [[id number]]  (Normal) Collected: 04/10/24 2308    Order Status: Completed Specimen: Blood Updated: 04/15/24 0300     CULTURE, BLOOD ([facility]) No Growth At 96 Hours             CV Ultrasound doppler venous DVT leg left  There was no evidence of deep or superficial vein thrombosis in the left   lower extremity.  X-Ray Foot Complete Left  Narrative: EXAMINATION:  XR FOOT COMPLETE 3 VIEW LEFT    CLINICAL HISTORY:  Leg swelling.    TECHNIQUE:  Three views    COMPARISON:  None available.    FINDINGS:  There is previous amputation of the great toe.  There is also chronic deformity of distal aspect of the 2nd toe with corticated margins.  No acute lytic destructive changes identified.  No acute fracture or dislocation.  There is soft tissue inflammation.  Impression: No acute osseous abnormality identified.    Electronically signed by: Sandro Lomas  Date:    04/10/2024  Time:    18:28         Medication List        START taking these medications      ciprofloxacin HCl 500 MG tablet  Commonly known as: CIPRO  Take 1 tablet (500 mg total) by mouth every 12 (twelve) hours. for 14 days     doxycycline 100 MG Cap  Commonly known as: VIBRAMYCIN  Take 1 capsule (100 mg total) by mouth 2 (two) times daily. for 14 days            CONTINUE  taking these medications      lisinopriL 20 MG tablet  Commonly known as: PRINIVIL,ZESTRIL  Take 1 tablet by mouth once daily.            STOP taking these medications      tadalafiL 20 MG Tab  Commonly known as: CIALIS               Where to Get Your Medications        These medications were sent to [facility] [address]  [address] [address]      Phone: [phone]   ciprofloxacin HCl 500 MG tablet  doxycycline 100 MG Cap          Explained in detail to the patient about the discharge plan, medications, and follow-up visits. Pt understands and agrees with the treatment plan  Discharge Disposition: Home or Self Care   Discharged Condition: stable  Diet-   Dietary Orders (From admission, onward)       Start     Ordered    04/11/24 0628  Diet Heart Healthy  Diet effective now         04/11/24 0627                   Medications Per DC med rec  Activities as tolerated   Follow-up Information       Jina Walter MD. Schedule an appointment as soon as possible for a visit in 2 week(s).    Specialty: Family Medicine  Why: Office will call patient to schedule follow-up appt.  Contact information:  [address] [place] [address].  [place] [address]  [phone]               Get Enriquez DPM. Go on 4/24/2024.    Specialty: Podiatry  Why: @10:30am.  Contact information:  [address]  [place] [address]  [phone]                           For further questions contact hospitalist office    Discharge time 34 minutes    For worsening symptoms, chest pain, shortness of breath, increased abdominal pain, high grade fever, stroke or stroke like symptoms, immediately go to the nearest Emergency Room or call 911 as soon as possible.    Portions of this note dictated using EMR integrated voice recognition software, and may be subject to voice recognition errors not corrected at proofreading. Please contact writer for clarification if needed    Carlos Boudreaux  MD  Department of Hospital Medicine   [facility]   04/15/2024 9:44 AM

## 2024-04-16 ENCOUNTER — TELEPHONE (OUTPATIENT)
Dept: PRIMARY CARE CLINIC | Facility: CLINIC | Age: 59
End: 2024-04-16
Payer: COMMERCIAL

## 2024-04-16 ENCOUNTER — PATIENT OUTREACH (OUTPATIENT)
Dept: ADMINISTRATIVE | Facility: CLINIC | Age: 59
End: 2024-04-16
Payer: COMMERCIAL

## 2024-04-16 PROBLEM — Z09 HOSPITAL DISCHARGE FOLLOW-UP: Status: ACTIVE | Noted: 2024-04-16

## 2024-04-16 LAB
BACTERIA BLD CULT: NORMAL
BACTERIA BLD CULT: NORMAL

## 2024-04-16 RX ORDER — HYDROCODONE BITARTRATE AND ACETAMINOPHEN 7.5; 325 MG/1; MG/1
1 TABLET ORAL EVERY 6 HOURS PRN
Qty: 20 TABLET | Refills: 0 | Status: SHIPPED | OUTPATIENT
Start: 2024-04-16 | End: 2024-04-23 | Stop reason: SDUPTHER

## 2024-04-16 NOTE — TELEPHONE ENCOUNTER
Pt was just released from the hospital. He stated that he is in a lot of pain. He also stated that he would not be able to take the pain until Thursday. He would like to have some pain medication sent in until his appointment. Thank you.

## 2024-04-16 NOTE — PROGRESS NOTES
C3 nurse attempted to contact Kamaljit Frias  for a TCC post hospital discharge follow up call. No answer. Left voicemail with callback information. The patient has a scheduled HOSFU appointment with Jian Walter MD  on 04/18/2024 @ 145 pm.   Appointment with Dr. Enriquez on 04/24/2024 @ 1030 am.

## 2024-04-17 NOTE — PROGRESS NOTES
"Hospital Follow Up       HPI:    Patient seen for office visit on 04/10/2024.  At that time he had left lower extremity swelling and erythema consistent with cellulitis.  He also had a wound over his left heel with drainage.  I recommend he go to the emergency department to be admitted for further evaluation treatment.  Upon presentation in the emergency department his temp was 100.1°, heart rate 120, blood pressure 167/87, RR 22 and pulse ox 95% on room air.  Labs significant for white count 10.81.  Venous ultrasound of left lower extremity significant for subcutaneous edema but no DVT.  Patient received Tylenol, Dilaudid, Toradol, Zofran, Zosyn and vancomycin.  Blood cultures-72 hours.    Wound culture revealed moderate Bordetella Henzii and rare MSSA.  Dr. SOLO was consulted; he recommended Cipro and doxycycline for 14 days.  He also recommended that patient need to be nonweightbearing on this extremity.  X-rays did not reveal any destructive bony changes or gas in the tissue.  No foreign bodies.  Wound care instructions given.      Current Outpatient Medications   Medication Instructions    ciprofloxacin HCl (CIPRO) 500 mg, Oral, Every 12 hours    doxycycline (VIBRAMYCIN) 100 mg, Oral, 2 times daily    HYDROcodone-acetaminophen (NORCO) 7.5-325 mg per tablet 1 tablet, Oral, Every 6 hours PRN    lisinopriL (PRINIVIL,ZESTRIL) 20 mg, Oral         ROS:    See HPI.      PE:    ..Visit Vitals  /69   Pulse 96   Temp 98.1 °F (36.7 °C)   Ht 6' 2" (1.88 m)   Wt 118.1 kg (260 lb 4.8 oz)   SpO2 95%   BMI 33.42 kg/m²      General:  He is well-developed well-nourished obese white male in no apparent distress.  He is alert and oriented.  He appears much better.  Left lower extremity:  Dressing and wrap noted.  No evidence of cellulitis.          1. Hospital discharge follow-up  Overview:  Patient presents for hospital follow-up.    He is status post left total hip replacement on 02/02/2023.  He has had an uneventful " postoperative recovery.  His blood pressure is elevated today; patient advised to monitor pressures at home.    04/18/2024:   See HPI.  Patient is doing much better.  He is staying at his sister's.  His blister is healing.  His cellulitis is much improved; finish course of ciprofloxacin and doxycycline.  Continue nonweightbearing per Dr Enriquez's recommendations.  Patient has follow-up with him in 1 week.    Patient takes Norco as needed for pain; potential risks and side effects discussed with patient.  Patient advised to call with any questions or concerns.        2. Cellulitis of left lower extremity  Overview:  Patient with erythema, pain and swelling to left lower extremity from foot to distal calf.  Patient has had foot swelling for weeks secondary to living in car.  He subsequently developed a blister on his heel which ruptured.  He now has a denuded area over his left heel adjacent macerated skin and cellulitis.  There is foul-smelling drainage dressing.  Will send patient to hospital to be admitted for further evaluation and wound management.  Patient will need IV antibiotics and wound care.    04/18/2024: See hospital discharge follow-up.    His cellulitis is much improved; finish course of ciprofloxacin and doxycycline.  Continue nonweightbearing left lower extremity per Dr. Enriquez recommendations.      3. Blister of left heel with infection, sequela  Overview:  Patient states his healed blister is healing nicely.  Continue nonweightbearing.    Patient has follow-up with Dr. Enriquez in 1 month.      4. Edema of left lower extremity  Overview:  Patient advised to take Lasix as needed or every other day to control edema.    07/10/2023:  He has not had any issues with lower extremity edema; he does not recall the last time he took Lasix.    04/10/2024: Patient with marked edema to left lower extremity for week secondary to living in automobile.  Patient has 3+ pitting edema from his foot to his proximal  shin on examination.    04/18/2024:  Patient has lower extremity edema is much improved.  He still has 2+ pitting edema of left lower extremity secondary to not elevating extremity.  He has no evidence of cellulitis.                ..Follow up if symptoms worsen or fail to improve.       No future appointments.

## 2024-04-17 NOTE — PROGRESS NOTES
C3 nurse attempted to contact Kamaljit Rodriguez  for a TCC post hospital discharge follow up call. No answer. Left voicemail with callback information. Called emergency contact number for Katarzyna. No answer. Left voicemail with call back number. The patient has a scheduled [facility] [place] appointment with Jian Walter MD  on 04/18/2024 @ 145 pm.   Appointment with Dr. Enriquez on 04/24/2024 @ 1030 am.

## 2024-04-18 ENCOUNTER — OFFICE VISIT (OUTPATIENT)
Dept: PRIMARY CARE CLINIC | Facility: CLINIC | Age: 59
End: 2024-04-18
Payer: COMMERCIAL

## 2024-04-18 VITALS
HEART RATE: 96 BPM | HEIGHT: 74 IN | DIASTOLIC BLOOD PRESSURE: 69 MMHG | WEIGHT: 260.31 LBS | BODY MASS INDEX: 33.41 KG/M2 | TEMPERATURE: 98 F | OXYGEN SATURATION: 95 % | SYSTOLIC BLOOD PRESSURE: 114 MMHG

## 2024-04-18 DIAGNOSIS — Z09 HOSPITAL DISCHARGE FOLLOW-UP: Primary | ICD-10-CM

## 2024-04-18 DIAGNOSIS — R60.0 EDEMA OF LEFT LOWER EXTREMITY: ICD-10-CM

## 2024-04-18 DIAGNOSIS — L03.116 CELLULITIS OF LEFT LOWER EXTREMITY: ICD-10-CM

## 2024-04-18 DIAGNOSIS — S90.822S: ICD-10-CM

## 2024-04-18 DIAGNOSIS — L08.9: ICD-10-CM

## 2024-04-18 PROCEDURE — 99214 OFFICE O/P EST MOD 30 MIN: CPT | Mod: ,,, | Performed by: FAMILY MEDICINE

## 2024-04-19 NOTE — PROGRESS NOTES
C3 nurse attempted to contact Kamaljit Frias  for a TCC post hospital discharge follow up call. No answer. Left voicemail with callback information. The patient had a scheduled HOSFU appointment with Jian Walter MD  on 04/18/2024.   Appointment with Dr. Enriquez on 04/24/2024 @ 1030 am.

## 2024-04-23 RX ORDER — HYDROCODONE BITARTRATE AND ACETAMINOPHEN 7.5; 325 MG/1; MG/1
1 TABLET ORAL EVERY 6 HOURS PRN
Qty: 15 TABLET | Refills: 0 | Status: SHIPPED | OUTPATIENT
Start: 2024-04-23 | End: 2024-05-02 | Stop reason: SDUPTHER

## 2024-04-23 RX ORDER — HYDROCODONE BITARTRATE AND ACETAMINOPHEN 7.5; 325 MG/1; MG/1
1 TABLET ORAL EVERY 6 HOURS PRN
Qty: 15 TABLET | Refills: 0 | Status: SHIPPED | OUTPATIENT
Start: 2024-04-23 | End: 2024-04-23 | Stop reason: SDUPTHER

## 2024-04-26 LAB
BACTERIA WND CULT: ABNORMAL
BACTERIA WND CULT: ABNORMAL
MAYO GENERIC ORDERABLE RESULT: NORMAL

## 2024-05-02 DIAGNOSIS — G89.29 CHRONIC MIDLINE LOW BACK PAIN WITHOUT SCIATICA: Primary | ICD-10-CM

## 2024-05-02 DIAGNOSIS — M54.50 CHRONIC MIDLINE LOW BACK PAIN WITHOUT SCIATICA: Primary | ICD-10-CM

## 2024-05-02 RX ORDER — HYDROCODONE BITARTRATE AND ACETAMINOPHEN 7.5; 325 MG/1; MG/1
1 TABLET ORAL EVERY 6 HOURS PRN
Qty: 10 TABLET | Refills: 0 | Status: SHIPPED | OUTPATIENT
Start: 2024-05-02 | End: 2024-05-03 | Stop reason: SDUPTHER

## 2024-05-02 RX ORDER — HYDROCODONE BITARTRATE AND ACETAMINOPHEN 7.5; 325 MG/1; MG/1
1 TABLET ORAL EVERY 6 HOURS PRN
Qty: 10 TABLET | Refills: 0 | OUTPATIENT
Start: 2024-05-02

## 2024-05-03 RX ORDER — HYDROCODONE BITARTRATE AND ACETAMINOPHEN 7.5; 325 MG/1; MG/1
1 TABLET ORAL EVERY 6 HOURS PRN
Qty: 10 TABLET | Refills: 0 | Status: SHIPPED | OUTPATIENT
Start: 2024-05-03

## 2024-07-22 PROBLEM — Z09 HOSPITAL DISCHARGE FOLLOW-UP: Status: RESOLVED | Noted: 2023-02-12 | Resolved: 2024-07-22

## 2024-10-09 ENCOUNTER — OFFICE VISIT (OUTPATIENT)
Dept: PRIMARY CARE CLINIC | Facility: CLINIC | Age: 59
End: 2024-10-09
Payer: COMMERCIAL

## 2024-10-09 VITALS
TEMPERATURE: 99 F | HEIGHT: 74 IN | DIASTOLIC BLOOD PRESSURE: 77 MMHG | WEIGHT: 266.38 LBS | BODY MASS INDEX: 34.19 KG/M2 | OXYGEN SATURATION: 95 % | HEART RATE: 102 BPM | SYSTOLIC BLOOD PRESSURE: 145 MMHG

## 2024-10-09 DIAGNOSIS — R60.0 EDEMA OF LEFT LOWER EXTREMITY: ICD-10-CM

## 2024-10-09 DIAGNOSIS — Z23 IMMUNIZATION DUE: ICD-10-CM

## 2024-10-09 DIAGNOSIS — L97.521 ULCER OF LEFT FOOT, LIMITED TO BREAKDOWN OF SKIN: Primary | ICD-10-CM

## 2024-10-09 DIAGNOSIS — I10 ESSENTIAL (PRIMARY) HYPERTENSION: ICD-10-CM

## 2024-10-09 PROCEDURE — 90656 IIV3 VACC NO PRSV 0.5 ML IM: CPT | Mod: ,,, | Performed by: FAMILY MEDICINE

## 2024-10-09 PROCEDURE — 90471 IMMUNIZATION ADMIN: CPT | Mod: ,,, | Performed by: FAMILY MEDICINE

## 2024-10-09 PROCEDURE — 99214 OFFICE O/P EST MOD 30 MIN: CPT | Mod: 25,,, | Performed by: FAMILY MEDICINE

## 2024-10-09 RX ORDER — CEFDINIR 300 MG/1
300 CAPSULE ORAL 2 TIMES DAILY
Qty: 20 CAPSULE | Refills: 0 | Status: SHIPPED | OUTPATIENT
Start: 2024-10-09 | End: 2024-10-09 | Stop reason: SDUPTHER

## 2024-10-09 RX ORDER — HYDROCODONE BITARTRATE AND ACETAMINOPHEN 7.5; 325 MG/1; MG/1
1 TABLET ORAL EVERY 6 HOURS PRN
Qty: 20 TABLET | Refills: 0 | Status: SHIPPED | OUTPATIENT
Start: 2024-10-09

## 2024-10-09 RX ORDER — CEFDINIR 300 MG/1
300 CAPSULE ORAL 2 TIMES DAILY
Qty: 28 CAPSULE | Refills: 0 | Status: SHIPPED | OUTPATIENT
Start: 2024-10-09 | End: 2024-10-23

## 2024-10-09 NOTE — Clinical Note
Please send referral to podiatrist, Dr. Get Enriquze.  He has cared for patient previously.  Thank you.

## 2024-10-09 NOTE — PROGRESS NOTES
"Wound Infection (Left heel sore x 1 month /Wants flu vaccine)       HPI:    Pt walked on a piece of glass a few months ago. He has a wound to his heel.  He reports slight pain and erythema. No fever/chills.  Patient with decreased sensation to his feet secondary to neuropathy.  Patient reports drainage.  He has left lower extremity edema which he states is normal.            Current Outpatient Medications   Medication Instructions    cefdinir (OMNICEF) 300 mg, Oral, 2 times daily    HYDROcodone-acetaminophen (NORCO) 7.5-325 mg per tablet 1 tablet, Oral, Every 6 hours PRN    lisinopriL (PRINIVIL,ZESTRIL) 20 mg, Oral         ROS:    He still lives in his vehicle at times.  He occasionally stays in hotels.  Patient took his last blood pressure pill yesterday.  He is going to pharmacy today to refill medication.      PE:    ..Visit Vitals  BP (!) 145/77   Pulse 102   Temp 98.7 °F (37.1 °C)   Ht 6' 2" (1.88 m)   Wt 120.8 kg (266 lb 6.4 oz)   SpO2 95%   BMI 34.20 kg/m²        General: He is a well-developed well-nourished obese white male in no apparent distress.  He is alert and oriented.  He has not ill-appearing.    Left lower extremity:  3+ pitting edema over foot, ankle and pretibial areas.    Foot:  Dime-size ulcerated lesion over plantar aspect of heel, no adjacent erythema.  No tenderness noted.  No discharge noted.  Wound base is clean healthy and pink.        1. Ulcer of left foot, limited to breakdown of skin  Overview:  Patient with denuded area to left heel; margins with maceration and adjacent cellulitis.  There is foul-smelling drainage noted.  He has cellulitis extending from foot to distal calf region.  Will send to ER to be admitted.    10/09/2024: Patient presents with ulcerated areas of left heel.  He states area has been draining.  He denies any fever or chills.  Exam reveals dime-sized ulcerated area to plantar aspect of heel.    Patient advised he will need to elevate left lower extremity when he is " not active.    Start Omnicef 300 mg twice daily times 14 days.    ER precautions given for worsening symptoms.    Refer to Get Enriquez, podiatrist.  He cared for patient's last ulcer.    Orders:  -     Ambulatory referral/consult to Podiatry; Future; Expected date: 10/16/2024    2. Essential (primary) hypertension  Overview:  Usually well controlled; elevated today x2.  Probably secondary to pain.  Patient advised to monitor blood pressures and let us know how they are doing.    Patient advised to monitor pressures and let me know how they are doing in 2 weeks.    07/10/2023: His blood pressure is well controlled; continue current medications.  Refills sent.    Limit sodium consumption.    Patient encouraged to increase physical activity, exercise and lose weight.    10/05/2023: His blood pressure is well controlled; continue current medication.    Limit sodium consumption.    Patient encouraged to lose weight.    10/09/2024:  His blood pressure is usually well controlled.  He did not take his blood pressure medication today; he is going to pharmacy to fill his prescription.  Limit sodium consumption.    Patient encouraged to lose weight.      3. Immunization due  Overview:  Influenza vaccine 0.5 IM administered; benefits/potential risks/side effects discussed with patient.    Orders:  -     (VFC) influenza (Flulaval, Fluzone, Fluarix) 45 mcg/0.5 mL IM vaccine (> or = 6 mo) 0.5 mL    4. Edema of left lower extremity  Overview:  Patient advised to take Lasix as needed or every other day to control edema.    07/10/2023:  He has not had any issues with lower extremity edema; he does not recall the last time he took Lasix.    04/10/2024: Patient with marked edema to left lower extremity for week secondary to living in automobile.  Patient has 3+ pitting edema from his foot to his proximal shin on examination.    04/18/2024:  Patient has lower extremity edema is much improved.  He still has 2+ pitting edema of left  lower extremity secondary to not elevating extremity.  He has no evidence of cellulitis.    10/09/2024: Patient still with pitting edema to left foot, ankle and pretibial area.  Patient advised to elevate lower extremity.    Patient advised to consider compression stocking.    Limit sodium consumption.    Patient declines prescription for Lasix at this time.      Other orders  -     Discontinue: cefdinir (OMNICEF) 300 MG capsule; Take 1 capsule (300 mg total) by mouth 2 (two) times daily. for 10 days  Dispense: 20 capsule; Refill: 0  -     cefdinir (OMNICEF) 300 MG capsule; Take 1 capsule (300 mg total) by mouth 2 (two) times daily. for 14 days  Dispense: 28 capsule; Refill: 0  -     HYDROcodone-acetaminophen (NORCO) 7.5-325 mg per tablet; Take 1 tablet by mouth every 6 (six) hours as needed for Pain.  Dispense: 20 tablet; Refill: 0              ..No follow-ups on file.       No future appointments.

## 2024-10-17 ENCOUNTER — TELEPHONE (OUTPATIENT)
Dept: PRIMARY CARE CLINIC | Facility: CLINIC | Age: 59
End: 2024-10-17
Payer: COMMERCIAL

## 2024-10-17 NOTE — TELEPHONE ENCOUNTER
----- Message from Nurse Huynh sent at 10/17/2024  1:49 PM CDT -----    ----- Message -----  From: Jenifer Squires  Sent: 10/17/2024   1:42 PM CDT  To: Jose Angel [facility] Staff    .Type:  Patient Returning Call    Who Called:pt  Who Left Message for Patient:pt  Does the patient know what this is regarding?:referral   Would the patient rather a call back or a response via [facility]?   Best Call Back Number:[phone]   Additional Information: Please call back about referral. Patient stats he has called several times for this      .Type:  RX Refill Request    Who Called: pt  Refill or New Rx:refill  RX Name and Strength:HYDROcodone-acetaminophen (NORCO) 7.5-325 mg per tablet  How is the patient currently taking it? (ex. 1XDay):2/DAY AS NEEDED  Is this a 30 day or 90 day RX:30  Preferred Pharmacy with phone number:[facility]   Local or Mail Order:Local  Ordering Provider:Jose Angel  Would the patient rather a call back or a response via [facility]?   Best Call Back Number:[phone]   Additional Information: HYDROcodone-acetaminophen (NORCO) 7.5-325 mg per tablet

## 2024-10-17 NOTE — TELEPHONE ENCOUNTER
Spoke with pt. Instructed referral was sent to Dr Enriquez 10/10/24. Instructed to call office and call back with any problems

## 2024-10-21 DIAGNOSIS — M16.11 PRIMARY OSTEOARTHRITIS OF RIGHT HIP: Primary | ICD-10-CM

## 2024-10-21 RX ORDER — HYDROCODONE BITARTRATE AND ACETAMINOPHEN 7.5; 325 MG/1; MG/1
1 TABLET ORAL EVERY 6 HOURS PRN
Qty: 16 TABLET | Refills: 0 | Status: SHIPPED | OUTPATIENT
Start: 2024-10-21

## 2024-10-31 DIAGNOSIS — M16.11 PRIMARY OSTEOARTHRITIS OF RIGHT HIP: ICD-10-CM

## 2024-10-31 RX ORDER — HYDROCODONE BITARTRATE AND ACETAMINOPHEN 7.5; 325 MG/1; MG/1
1 TABLET ORAL EVERY 6 HOURS PRN
Qty: 15 TABLET | Refills: 0 | Status: SHIPPED | OUTPATIENT
Start: 2024-10-31

## 2024-12-08 NOTE — PROGRESS NOTES
"Letter for School/Work (Work Release )       HPI:    Patient presents for work-related.  Patient seen on 10/09/2024 for left foot ulcer.  Patient treated by Dr. Enriquez with antibiotics.  Patient stated his sister's a while but is now back living in his car.  Patient denies any heel pain or drainage.  States ulcer has closed up.  He denies any fever or chills.        Current Outpatient Medications   Medication Instructions    HYDROcodone-acetaminophen (NORCO) 7.5-325 mg per tablet 1 tablet, Oral, Every 6 hours PRN    lisinopriL (PRINIVIL,ZESTRIL) 20 mg, Oral         ROS:    See HPI.      PE:    ..Visit Vitals  BP (!) 152/103   Pulse 85   Temp 98.1 °F (36.7 °C)   Ht 6' 2" (1.88 m)   Wt 116 kg (255 lb 12.8 oz)   SpO2 96%   BMI 32.84 kg/m²        General: He is well-developed well-nourished white male in no apparent distress.  He is alert and oriented.    Chest: Clear to auscultation bilaterally.    CV: Regular rate rhythm without murmurs rubs or gallops.    Left ankle/foot:  Diffusely edematous without evidence of cellulitis.  Plantar surface of foot:  Heel:  Healing ulcer brown eschar, nontender, no drainage, no erythema, fluctuance or induration.  Poor hygiene; foot is not clean.  Dry skin with flaking.        1. Ulcer of left foot, limited to breakdown of skin  Overview:  Patient with denuded area to left heel; margins with maceration and adjacent cellulitis.  There is foul-smelling drainage noted.  He has cellulitis extending from foot to distal calf region.  Will send to ER to be admitted.    10/09/2024: Patient presents with ulcerated areas of left heel.  He states area has been draining.  He denies any fever or chills.  Exam reveals dime-sized ulcerated area to plantar aspect of heel.    Patient advised he will need to elevate left lower extremity when he is not active.    Start Omnicef 300 mg twice daily times 14 days.    ER precautions given for worsening symptoms.    Refer to Get Enriquez, podiatrist.  He " cared for patient's last ulcer.    12/11/2024:  Patient presents for possible work release.  He saw Dr. Enriquez for his foot ulcer.  He sees him again on 12/17/2024.  He states ulcers closed in.  Exam does not reveal any drainage, tenderness or erythema.  However, in general, his heel/plantar surface of foot does not look well.  I advise patient to follow-up with his podiatrist his plan and that he will need to get work clearance from him.  ER precautions given.  I advised patient that it would be very difficult to treat this properly with him living in his car.      2. Essential (primary) hypertension  Overview:  Usually well controlled; elevated today x2.  Probably secondary to pain.  Patient advised to monitor blood pressures and let us know how they are doing.    Patient advised to monitor pressures and let me know how they are doing in 2 weeks.    07/10/2023: His blood pressure is well controlled; continue current medications.  Refills sent.    Limit sodium consumption.    Patient encouraged to increase physical activity, exercise and lose weight.    10/05/2023: His blood pressure is well controlled; continue current medication.    Limit sodium consumption.    Patient encouraged to lose weight.    10/09/2024:  His blood pressure is usually well controlled.  He did not take his blood pressure medication today; he is going to pharmacy to fill his prescription.  Limit sodium consumption.    Patient encouraged to lose weight.    Assessment & Plan:  Patient did not take his blood pressure medication today.  Patient advised to take his medications regularly and to monitor his blood pressures at home.  He is to let us know if his blood pressures are elevated.                ..Follow up if symptoms worsen or fail to improve.       No future appointments.

## 2024-12-11 ENCOUNTER — OFFICE VISIT (OUTPATIENT)
Dept: PRIMARY CARE CLINIC | Facility: CLINIC | Age: 59
End: 2024-12-11
Payer: COMMERCIAL

## 2024-12-11 VITALS
BODY MASS INDEX: 32.83 KG/M2 | WEIGHT: 255.81 LBS | SYSTOLIC BLOOD PRESSURE: 152 MMHG | OXYGEN SATURATION: 96 % | TEMPERATURE: 98 F | HEART RATE: 85 BPM | HEIGHT: 74 IN | DIASTOLIC BLOOD PRESSURE: 103 MMHG

## 2024-12-11 DIAGNOSIS — L97.521 ULCER OF LEFT FOOT, LIMITED TO BREAKDOWN OF SKIN: Primary | ICD-10-CM

## 2024-12-11 DIAGNOSIS — I10 ESSENTIAL (PRIMARY) HYPERTENSION: ICD-10-CM

## 2024-12-11 NOTE — LETTER
December 11, 2024      [address] [facility]  [facility] [address]  Phone: [phone]  Fax: [phone]       Patient: Kamaljit Frias   YOB: 1965  Date of Visit: 12/11/2024    To Whom It May Concern:    

## 2024-12-11 NOTE — ASSESSMENT & PLAN NOTE
Patient did not take his blood pressure medication today.  Patient advised to take his medications regularly and to monitor his blood pressures at home.  He is to let us know if his blood pressures are elevated.

## 2024-12-13 ENCOUNTER — PATIENT MESSAGE (OUTPATIENT)
Dept: PRIMARY CARE CLINIC | Facility: CLINIC | Age: 59
End: 2024-12-13
Payer: COMMERCIAL

## 2024-12-13 RX ORDER — CIPROFLOXACIN 500 MG/1
500 TABLET ORAL EVERY 12 HOURS
Qty: 14 TABLET | Refills: 0 | Status: SHIPPED | OUTPATIENT
Start: 2024-12-13 | End: 2024-12-20

## 2024-12-13 RX ORDER — DOXYCYCLINE HYCLATE 100 MG
100 TABLET ORAL EVERY 12 HOURS
Qty: 14 TABLET | Refills: 0 | Status: SHIPPED | OUTPATIENT
Start: 2024-12-13 | End: 2024-12-20

## 2024-12-13 NOTE — TELEPHONE ENCOUNTER
Please advise patient I sent prescriptions for 2 antibiotics for him to take till he sees podiatrist next week.  Thank you.

## 2025-01-02 ENCOUNTER — HOSPITAL ENCOUNTER (EMERGENCY)
Facility: HOSPITAL | Age: 60
Discharge: HOME | End: 2025-01-02
Attending: EMERGENCY MEDICINE

## 2025-01-02 VITALS
WEIGHT: 250 LBS | SYSTOLIC BLOOD PRESSURE: 163 MMHG | HEART RATE: 97 BPM | HEIGHT: 73 IN | RESPIRATION RATE: 20 BRPM | TEMPERATURE: 98 F | BODY MASS INDEX: 33.13 KG/M2 | DIASTOLIC BLOOD PRESSURE: 109 MMHG | OXYGEN SATURATION: 96 %

## 2025-01-02 DIAGNOSIS — L02.212 BACK ABSCESS: Primary | ICD-10-CM

## 2025-01-02 PROCEDURE — 10060 I&D ABSCESS SIMPLE/SINGLE: CPT

## 2025-01-02 PROCEDURE — 63600175 PHARM REV CODE 636 W HCPCS: Performed by: PHYSICIAN ASSISTANT

## 2025-01-02 PROCEDURE — 99284 EMERGENCY DEPT VISIT MOD MDM: CPT | Mod: 25

## 2025-01-02 RX ORDER — SULFAMETHOXAZOLE AND TRIMETHOPRIM 800; 160 MG/1; MG/1
1 TABLET ORAL 2 TIMES DAILY
Qty: 14 TABLET | Refills: 0 | Status: SHIPPED | OUTPATIENT
Start: 2025-01-02 | End: 2025-01-09

## 2025-01-02 RX ORDER — LIDOCAINE HYDROCHLORIDE 10 MG/ML
5 INJECTION, SOLUTION INFILTRATION; PERINEURAL
Status: COMPLETED | OUTPATIENT
Start: 2025-01-02 | End: 2025-01-02

## 2025-01-02 RX ORDER — HYDROCODONE BITARTRATE AND ACETAMINOPHEN 5; 325 MG/1; MG/1
1 TABLET ORAL EVERY 8 HOURS PRN
Qty: 9 TABLET | Refills: 0 | Status: SHIPPED | OUTPATIENT
Start: 2025-01-02 | End: 2025-01-05

## 2025-01-02 RX ADMIN — LIDOCAINE HYDROCHLORIDE 5 ML: 10 INJECTION, SOLUTION INFILTRATION; PERINEURAL at 12:01

## 2025-01-02 NOTE — ED PROVIDER NOTES
Encounter Date: 1/2/2025       History     Chief Complaint   Patient presents with    Abscess     Pt has abscess on back under the right shoulder. Onset x 1.5 weeks ago. With gradual worsening over the past few days. Pt states lisinopril this am prior to arrival.      59-year-old male presents to ED for evaluation of abscess to his back.  Patient reports that he has had multiple abscess and cyst on his back.  States that this abscess.  1.5 weeks ago.  States the pain has increased.  Denies any drainage.  Denies any fever.  States he is not currently on any antibiotics.    The history is provided by the patient. No  was used.     Review of patient's allergies indicates:  No Known Allergies  Past Medical History:   Diagnosis Date    Essential (primary) hypertension     Neuropathy of lower extremity      Past Surgical History:   Procedure Laterality Date    COLOSTOMY  1981    Detachment at Left 1st Toe, Complete, Open Approach Left 03/13/2017    herniated disc repaired   2015    left femur break   2008    left hip replacement  02/02/2023    sciatic nerve repair  Left 1980     Family History   Problem Relation Name Age of Onset    No Known Problems Mother      Stroke Father      Fibromyalgia Sister      No Known Problems Sister       Social History     Tobacco Use    Smoking status: Former    Smokeless tobacco: Never    Tobacco comments:     Age started: 18; Age stopped: 54   Substance Use Topics    Alcohol use: Yes     Comment: 1-2 times per week    Drug use: Never     Review of Systems   Constitutional:  Negative for fever.   HENT:  Negative for sore throat.    Respiratory:  Negative for shortness of breath.    Cardiovascular:  Negative for chest pain.   Gastrointestinal:  Negative for nausea.   Genitourinary:  Negative for dysuria.   Musculoskeletal:  Negative for back pain.   Skin:  Positive for wound. Negative for rash.   Neurological:  Negative for weakness.   Hematological:  Does not  bruise/bleed easily.       Physical Exam     Initial Vitals [01/02/25 1110]   BP Pulse Resp Temp SpO2   (!) 163/109 97 20 98.1 °F (36.7 °C) 96 %      MAP       --         Physical Exam    Nursing note and vitals reviewed.  Constitutional: He appears well-developed and well-nourished. He is cooperative.   HENT:   Head: Normocephalic and atraumatic.   Right Ear: Tympanic membrane and external ear normal.   Left Ear: Tympanic membrane and external ear normal. Mouth/Throat: Uvula is midline, oropharynx is clear and moist and mucous membranes are normal. No trismus in the jaw. No uvula swelling.   Eyes: Conjunctivae are normal. Pupils are equal, round, and reactive to light.   Neck: Neck supple.   Normal range of motion.  Cardiovascular:  Normal rate, regular rhythm and normal heart sounds.           Pulmonary/Chest: Breath sounds normal. No respiratory distress. He has no wheezes. He has no rhonchi. He has no rales.   Abdominal: Abdomen is soft. Bowel sounds are normal. There is no abdominal tenderness.   Musculoskeletal:         General: Normal range of motion.      Cervical back: Normal range of motion and neck supple.     Neurological: He is alert and oriented to person, place, and time.   Skin: Skin is warm and dry. Capillary refill takes less than 2 seconds.   2 cm circular raised erythematous area noted to right upper back.  Fluctuant.   Psychiatric: He has a normal mood and affect.         ED Course   I & D - Incision and Drainage    Date/Time: 1/2/2025 12:26 PM  Location procedure was performed: [facility] EMERGENCY DEPARTMENT    Performed by: Bere Sanches PA  Authorized by: Bere Sanches PA  Consent Done: Yes  Consent: Verbal consent obtained.  Consent given by: patient  Patient understanding: patient states understanding of the procedure being performed  Patient identity confirmed: name and verbally with patient  Type: abscess  Body area: trunk  Location details: back  Anesthesia: local  infiltration    Anesthesia:  Local Anesthetic: lidocaine 1% without epinephrine  Anesthetic total: 2 mL  Scalpel size: 11  Incision type: single straight  Incision depth: dermal  Complexity: simple  Drainage: pus, purulent and bloody  Drainage amount: moderate  Wound treatment: incision, wound left open, wound packed, deloculation, expression of material, drainage and removal of cyst capsule  Packing material: 1/4 in iodoform gauze  Patient tolerance: Patient tolerated the procedure well with no immediate complications    Incision depth: dermal        Labs Reviewed - No data to display       Imaging Results    None          Medications   LIDOcaine HCL 10 mg/ml (1%) injection 5 mL (5 mLs Infiltration Given 1/2/25 1226)     Medical Decision Making  59-year-old male presents to ED for evaluation of abscess to his back.  Patient reports that he has had multiple abscess and cyst on his back.  States that this abscess.  1.5 weeks ago.  States the pain has increased.  Denies any drainage.  Denies any fever.  States he is not currently on any antibiotics.    Differential diagnosis includes but isn't limited to abscess, cyst    Amount and/or Complexity of Data Reviewed  Discussion of management or test interpretation with external provider(s): Patient presents to ED for evaluation right upper abscess to his back over the last week and a.  Denies any drainage or fever.  Areas erythematous raised and fluctuant.  Patient has had multiple I and D's performed 2 different areas of his back.  I&D performed successfully.  Opened and drained.  Expression of material as well as what appears to be the sac of the cyst.  Patient wound packed.  Will place on antibiotics to cover for possible infection.  Discussed using ibuprofen and Tylenol.  Discussed warm compresses.  Discussed removing packing in 2-3 days.  Discussed return ED precautions.  Patient verbalizes understanding.    Risk  OTC drugs.  Prescription drug management.                                       Clinical Impression:  Final diagnoses:  [L02.212] Back abscess (Primary)          ED Disposition Condition    Discharge Stable          ED Prescriptions       Medication Sig Dispense Start Date End Date Auth. Provider    sulfamethoxazole-trimethoprim 800-160mg (BACTRIM DS) 800-160 mg Tab Take 1 tablet by mouth 2 (two) times daily. for 7 days 14 tablet 1/2/2025 1/9/2025 Bere Sanches PA          Follow-up Information       Follow up With Specialties Details Why Contact Info    Jian Walter MD Family Medicine   [address] [place] [address] 6  [facility] [address]  [phone]               Bere Sanches PA  01/02/25 6856

## 2025-01-02 NOTE — DISCHARGE INSTRUCTIONS
Apply warm compresses to area.  Take full course of antibiotics.  Remove packing in 2-3 days.  Follow up with PCP/dermatology as needed for further evaluation cyst/abscess.

## 2025-02-24 RX ORDER — CIPROFLOXACIN 500 MG/1
500 TABLET ORAL EVERY 12 HOURS
Qty: 28 TABLET | Refills: 0 | Status: SHIPPED | OUTPATIENT
Start: 2025-02-24 | End: 2025-03-10

## 2025-02-24 RX ORDER — DOXYCYCLINE HYCLATE 100 MG
100 TABLET ORAL 2 TIMES DAILY
Qty: 28 TABLET | Refills: 0 | Status: SHIPPED | OUTPATIENT
Start: 2025-02-24 | End: 2025-03-10

## 2025-03-09 ENCOUNTER — HOSPITAL ENCOUNTER (INPATIENT)
Facility: HOSPITAL | Age: 60
LOS: 5 days | Discharge: HOME | DRG: 571 | End: 2025-03-14
Attending: EMERGENCY MEDICINE | Admitting: INTERNAL MEDICINE
Payer: COMMERCIAL

## 2025-03-09 DIAGNOSIS — L08.9: ICD-10-CM

## 2025-03-09 DIAGNOSIS — L08.9 BLISTER OF LEFT HEEL WITH INFECTION, INITIAL ENCOUNTER: ICD-10-CM

## 2025-03-09 DIAGNOSIS — T14.8XXA WOUND INFECTION: ICD-10-CM

## 2025-03-09 DIAGNOSIS — S90.822A BLISTER OF LEFT HEEL WITH INFECTION, INITIAL ENCOUNTER: ICD-10-CM

## 2025-03-09 DIAGNOSIS — M79.672 PAIN OF LEFT HEEL: ICD-10-CM

## 2025-03-09 DIAGNOSIS — L03.116 CELLULITIS OF LEFT LOWER EXTREMITY: ICD-10-CM

## 2025-03-09 DIAGNOSIS — L08.9 WOUND INFECTION: ICD-10-CM

## 2025-03-09 DIAGNOSIS — S90.822S: ICD-10-CM

## 2025-03-09 DIAGNOSIS — M16.11 PRIMARY OSTEOARTHRITIS OF RIGHT HIP: ICD-10-CM

## 2025-03-09 DIAGNOSIS — L08.9 LEFT FOOT INFECTION: Primary | ICD-10-CM

## 2025-03-09 DIAGNOSIS — I82.409 DVT (DEEP VENOUS THROMBOSIS): ICD-10-CM

## 2025-03-09 DIAGNOSIS — N17.9 AKI (ACUTE KIDNEY INJURY): ICD-10-CM

## 2025-03-09 DIAGNOSIS — L03.116 LEFT LEG CELLULITIS: ICD-10-CM

## 2025-03-09 LAB
ALBUMIN SERPL-MCNC: 3.5 G/DL (ref 3.4–4.8)
ALBUMIN/GLOB SERPL: 1 RATIO (ref 1.1–2)
ALP SERPL-CCNC: 58 UNIT/L (ref 40–150)
ALT SERPL-CCNC: 28 UNIT/L (ref 0–55)
ANION GAP SERPL CALC-SCNC: 14 MEQ/L
AST SERPL-CCNC: 48 UNIT/L (ref 5–34)
BACTERIA #/AREA URNS AUTO: ABNORMAL /HPF
BASOPHILS # BLD AUTO: 0.04 X10(3)/MCL
BASOPHILS NFR BLD AUTO: 0.5 %
BILIRUB SERPL-MCNC: 1.2 MG/DL
BILIRUB UR QL STRIP.AUTO: NEGATIVE
BUN SERPL-MCNC: 47.7 MG/DL (ref 8.4–25.7)
CALCIUM SERPL-MCNC: 8.7 MG/DL (ref 8.8–10)
CHLORIDE SERPL-SCNC: 104 MMOL/L (ref 98–107)
CLARITY UR: CLEAR
CO2 SERPL-SCNC: 20 MMOL/L (ref 23–31)
COLOR UR AUTO: COLORLESS
CREAT SERPL-MCNC: 2.51 MG/DL (ref 0.72–1.25)
CREAT UR-MCNC: 48.7 MG/DL (ref 63–166)
CREAT/UREA NIT SERPL: 19
CRP SERPL-MCNC: 66.5 MG/L
EOSINOPHIL # BLD AUTO: 0.21 X10(3)/MCL (ref 0–0.9)
EOSINOPHIL NFR BLD AUTO: 2.8 %
ERYTHROCYTE [DISTWIDTH] IN BLOOD BY AUTOMATED COUNT: 13.5 % (ref 11.5–17)
GFR SERPLBLD CREATININE-BSD FMLA CKD-EPI: 28 ML/MIN/1.73/M2
GLOBULIN SER-MCNC: 3.6 GM/DL (ref 2.4–3.5)
GLUCOSE SERPL-MCNC: 126 MG/DL (ref 82–115)
GLUCOSE UR QL STRIP: NORMAL
HCT VFR BLD AUTO: 39.1 % (ref 42–52)
HGB BLD-MCNC: 13.4 G/DL (ref 14–18)
HGB UR QL STRIP: NEGATIVE
IMM GRANULOCYTES # BLD AUTO: 0.04 X10(3)/MCL (ref 0–0.04)
IMM GRANULOCYTES NFR BLD AUTO: 0.5 %
KETONES UR QL STRIP: NEGATIVE
LACTATE SERPL-SCNC: 1.8 MMOL/L (ref 0.5–2.2)
LEUKOCYTE ESTERASE UR QL STRIP: NEGATIVE
LYMPHOCYTES # BLD AUTO: 1.3 X10(3)/MCL (ref 0.6–4.6)
LYMPHOCYTES NFR BLD AUTO: 17.4 %
MCH RBC QN AUTO: 33.1 PG (ref 27–31)
MCHC RBC AUTO-ENTMCNC: 34.3 G/DL (ref 33–36)
MCV RBC AUTO: 96.5 FL (ref 80–94)
MONOCYTES # BLD AUTO: 0.69 X10(3)/MCL (ref 0.1–1.3)
MONOCYTES NFR BLD AUTO: 9.3 %
MRSA PCR SCRN (OHS): NOT DETECTED
MUCOUS THREADS URNS QL MICRO: ABNORMAL /LPF
NEUTROPHILS # BLD AUTO: 5.17 X10(3)/MCL (ref 2.1–9.2)
NEUTROPHILS NFR BLD AUTO: 69.5 %
NITRITE UR QL STRIP: NEGATIVE
NRBC BLD AUTO-RTO: 0 %
PH UR STRIP: 5 [PH]
PLATELET # BLD AUTO: 153 X10(3)/MCL (ref 130–400)
PMV BLD AUTO: 9 FL (ref 7.4–10.4)
POTASSIUM SERPL-SCNC: 4.4 MMOL/L (ref 3.5–5.1)
PROT SERPL-MCNC: 7.1 GM/DL (ref 5.8–7.6)
PROT UR QL STRIP: NEGATIVE
RBC # BLD AUTO: 4.05 X10(6)/MCL (ref 4.7–6.1)
RBC #/AREA URNS AUTO: ABNORMAL /HPF
SODIUM SERPL-SCNC: 138 MMOL/L (ref 136–145)
SODIUM UR-SCNC: 84 MMOL/L
SP GR UR STRIP.AUTO: 1.01 (ref 1–1.03)
SQUAMOUS #/AREA URNS LPF: ABNORMAL /HPF
UROBILINOGEN UR STRIP-ACNC: NORMAL
WBC # BLD AUTO: 7.45 X10(3)/MCL (ref 4.5–11.5)
WBC #/AREA URNS AUTO: ABNORMAL /HPF

## 2025-03-09 PROCEDURE — 87641 MR-STAPH DNA AMP PROBE: CPT | Performed by: NURSE PRACTITIONER

## 2025-03-09 PROCEDURE — 11000001 HC ACUTE MED/SURG PRIVATE ROOM

## 2025-03-09 PROCEDURE — 96367 TX/PROPH/DG ADDL SEQ IV INF: CPT

## 2025-03-09 PROCEDURE — 63600175 PHARM REV CODE 636 W HCPCS: Performed by: NURSE PRACTITIONER

## 2025-03-09 PROCEDURE — 80053 COMPREHEN METABOLIC PANEL: CPT | Performed by: EMERGENCY MEDICINE

## 2025-03-09 PROCEDURE — 85025 COMPLETE CBC W/AUTO DIFF WBC: CPT | Performed by: EMERGENCY MEDICINE

## 2025-03-09 PROCEDURE — 86140 C-REACTIVE PROTEIN: CPT | Performed by: EMERGENCY MEDICINE

## 2025-03-09 PROCEDURE — 84300 ASSAY OF URINE SODIUM: CPT | Performed by: INTERNAL MEDICINE

## 2025-03-09 PROCEDURE — 83605 ASSAY OF LACTIC ACID: CPT | Performed by: EMERGENCY MEDICINE

## 2025-03-09 PROCEDURE — 87040 BLOOD CULTURE FOR BACTERIA: CPT | Performed by: EMERGENCY MEDICINE

## 2025-03-09 PROCEDURE — 25000003 PHARM REV CODE 250

## 2025-03-09 PROCEDURE — 25000003 PHARM REV CODE 250: Performed by: NURSE PRACTITIONER

## 2025-03-09 PROCEDURE — 87070 CULTURE OTHR SPECIMN AEROBIC: CPT | Performed by: EMERGENCY MEDICINE

## 2025-03-09 PROCEDURE — 25000003 PHARM REV CODE 250: Performed by: EMERGENCY MEDICINE

## 2025-03-09 PROCEDURE — 99285 EMERGENCY DEPT VISIT HI MDM: CPT | Mod: 25

## 2025-03-09 PROCEDURE — 63600175 PHARM REV CODE 636 W HCPCS: Performed by: EMERGENCY MEDICINE

## 2025-03-09 PROCEDURE — 82570 ASSAY OF URINE CREATININE: CPT | Performed by: INTERNAL MEDICINE

## 2025-03-09 PROCEDURE — 81001 URINALYSIS AUTO W/SCOPE: CPT | Performed by: INTERNAL MEDICINE

## 2025-03-09 PROCEDURE — 96375 TX/PRO/DX INJ NEW DRUG ADDON: CPT

## 2025-03-09 PROCEDURE — 96365 THER/PROPH/DIAG IV INF INIT: CPT

## 2025-03-09 RX ORDER — ACETAMINOPHEN 325 MG/1
650 TABLET ORAL EVERY 8 HOURS PRN
Status: DISCONTINUED | OUTPATIENT
Start: 2025-03-09 | End: 2025-03-09

## 2025-03-09 RX ORDER — TRAMADOL HYDROCHLORIDE 50 MG/1
50 TABLET ORAL EVERY 6 HOURS PRN
Status: DISCONTINUED | OUTPATIENT
Start: 2025-03-09 | End: 2025-03-09

## 2025-03-09 RX ORDER — SODIUM CHLORIDE, SODIUM LACTATE, POTASSIUM CHLORIDE, CALCIUM CHLORIDE 600; 310; 30; 20 MG/100ML; MG/100ML; MG/100ML; MG/100ML
INJECTION, SOLUTION INTRAVENOUS CONTINUOUS
Status: DISCONTINUED | OUTPATIENT
Start: 2025-03-09 | End: 2025-03-14 | Stop reason: HOSPADM

## 2025-03-09 RX ORDER — ACETAMINOPHEN 500 MG
1000 TABLET ORAL EVERY 6 HOURS PRN
Status: DISCONTINUED | OUTPATIENT
Start: 2025-03-09 | End: 2025-03-14 | Stop reason: HOSPADM

## 2025-03-09 RX ORDER — ONDANSETRON HYDROCHLORIDE 2 MG/ML
4 INJECTION, SOLUTION INTRAVENOUS
Status: COMPLETED | OUTPATIENT
Start: 2025-03-09 | End: 2025-03-09

## 2025-03-09 RX ORDER — ACETAMINOPHEN 325 MG/1
650 TABLET ORAL EVERY 4 HOURS PRN
Status: DISCONTINUED | OUTPATIENT
Start: 2025-03-09 | End: 2025-03-09

## 2025-03-09 RX ORDER — ONDANSETRON HYDROCHLORIDE 2 MG/ML
4 INJECTION, SOLUTION INTRAVENOUS EVERY 8 HOURS PRN
Status: DISCONTINUED | OUTPATIENT
Start: 2025-03-09 | End: 2025-03-14 | Stop reason: HOSPADM

## 2025-03-09 RX ORDER — HYDROCODONE BITARTRATE AND ACETAMINOPHEN 7.5; 325 MG/1; MG/1
1 TABLET ORAL EVERY 6 HOURS PRN
Refills: 0 | Status: DISCONTINUED | OUTPATIENT
Start: 2025-03-09 | End: 2025-03-11

## 2025-03-09 RX ORDER — MORPHINE SULFATE 4 MG/ML
4 INJECTION, SOLUTION INTRAMUSCULAR; INTRAVENOUS
Refills: 0 | Status: COMPLETED | OUTPATIENT
Start: 2025-03-09 | End: 2025-03-09

## 2025-03-09 RX ADMIN — PIPERACILLIN SODIUM AND TAZOBACTAM SODIUM 4.5 G: 4; .5 INJECTION, POWDER, LYOPHILIZED, FOR SOLUTION INTRAVENOUS at 10:03

## 2025-03-09 RX ADMIN — SODIUM CHLORIDE, POTASSIUM CHLORIDE, SODIUM LACTATE AND CALCIUM CHLORIDE: 600; 310; 30; 20 INJECTION, SOLUTION INTRAVENOUS at 08:03

## 2025-03-09 RX ADMIN — SODIUM CHLORIDE, POTASSIUM CHLORIDE, SODIUM LACTATE AND CALCIUM CHLORIDE: 600; 310; 30; 20 INJECTION, SOLUTION INTRAVENOUS at 11:03

## 2025-03-09 RX ADMIN — PIPERACILLIN SODIUM AND TAZOBACTAM SODIUM 4.5 G: 4; .5 INJECTION, POWDER, LYOPHILIZED, FOR SOLUTION INTRAVENOUS at 08:03

## 2025-03-09 RX ADMIN — SODIUM CHLORIDE, POTASSIUM CHLORIDE, SODIUM LACTATE AND CALCIUM CHLORIDE 1000 ML: 600; 310; 30; 20 INJECTION, SOLUTION INTRAVENOUS at 09:03

## 2025-03-09 RX ADMIN — ACETAMINOPHEN 650 MG: 325 TABLET, FILM COATED ORAL at 04:03

## 2025-03-09 RX ADMIN — MORPHINE SULFATE 4 MG: 4 INJECTION INTRAVENOUS at 08:03

## 2025-03-09 RX ADMIN — VANCOMYCIN HYDROCHLORIDE 1000 MG: 1 INJECTION, POWDER, LYOPHILIZED, FOR SOLUTION INTRAVENOUS at 11:03

## 2025-03-09 RX ADMIN — TRAMADOL HYDROCHLORIDE 50 MG: 50 TABLET, COATED ORAL at 05:03

## 2025-03-09 RX ADMIN — HYDROCODONE BITARTRATE AND ACETAMINOPHEN 1 TABLET: 7.5; 325 TABLET ORAL at 08:03

## 2025-03-09 RX ADMIN — PIPERACILLIN SODIUM AND TAZOBACTAM SODIUM 4.5 G: 4; .5 INJECTION, POWDER, LYOPHILIZED, FOR SOLUTION INTRAVENOUS at 04:03

## 2025-03-09 RX ADMIN — ONDANSETRON 4 MG: 2 INJECTION INTRAMUSCULAR; INTRAVENOUS at 08:03

## 2025-03-09 RX ADMIN — VANCOMYCIN HYDROCHLORIDE 1000 MG: 1 INJECTION, POWDER, LYOPHILIZED, FOR SOLUTION INTRAVENOUS at 09:03

## 2025-03-09 NOTE — ED PROVIDER NOTES
Encounter Date: 3/9/2025       History     Chief Complaint   Patient presents with    Foot Pain     Pt wheeled into triage and arrives via POV. States that he has had bilateral foot swelling x 1 year. Also endorses L foot pain and states that he has an open draining wound on the bottom of the L foot that began a few weeks prior but has worsened recently. Denies fevers at home.      59-year-old male presenting with left foot pain.  Reports he was had an ulcer for a long time but a few weeks ago he messed up the wound worse while putting on a pair of boots and since then the wound has continually worsened.  He saw his PCP and has been on Cipro and doxycycline without improvement.  No fever.    The history is provided by the patient.     Review of patient's allergies indicates:  No Known Allergies  Past Medical History:   Diagnosis Date    Essential (primary) hypertension     Neuropathy of lower extremity      Past Surgical History:   Procedure Laterality Date    COLOSTOMY  1981    Detachment at Left 1st Toe, Complete, Open Approach Left 03/13/2017    herniated disc repaired   2015    left femur break   2008    left hip replacement  02/02/2023    sciatic nerve repair  Left 1980     Family History   Problem Relation Name Age of Onset    No Known Problems Mother      Stroke Father      Fibromyalgia Sister      No Known Problems Sister       Social History[1]  Review of Systems   Constitutional:  Negative for fever.   Skin:  Positive for wound.       Physical Exam     Initial Vitals [03/09/25 0759]   BP Pulse Resp Temp SpO2   127/73 (!) 112 18 98.5 °F (36.9 °C) 96 %      MAP       --         Physical Exam    Nursing note and vitals reviewed.  Constitutional: He appears well-developed and well-nourished. He is not diaphoretic. He does not appear ill. No distress.   HENT:   Head: Normocephalic and atraumatic.   Right Ear: External ear normal.   Left Ear: External ear normal.   Nose: Nose normal. Mouth/Throat: Oropharynx is  clear and moist.   Eyes: Conjunctivae are normal.   Neck: Neck supple. No tracheal deviation present.   Cardiovascular:  Normal rate, regular rhythm and normal heart sounds.           Pulmonary/Chest: Breath sounds normal. No respiratory distress. He has no wheezes. He has no rhonchi. He has no rales.   Abdominal: Abdomen is soft. He exhibits no distension. There is no abdominal tenderness.   Musculoskeletal:      Cervical back: Neck supple.      Comments: 2+ pitting edema to LLE with large open wound to left heel, cellulitis to foot and calf      Neurological: He is alert and oriented to person, place, and time. He has normal strength. GCS score is 15. GCS eye subscore is 4. GCS verbal subscore is 5. GCS motor subscore is 6.   Skin: Skin is warm and dry. Capillary refill takes less than 2 seconds. No pallor.   Psychiatric: He has a normal mood and affect. His mood appears not anxious.                   ED Course   Procedures  Labs Reviewed   COMPREHENSIVE METABOLIC PANEL - Abnormal       Result Value    Sodium 138      Potassium 4.4      Chloride 104      CO2 20 (*)     Glucose 126 (*)     Blood Urea Nitrogen 47.7 (*)     Creatinine 2.51 (*)     Calcium 8.7 (*)     Protein Total 7.1      Albumin 3.5      Globulin 3.6 (*)     Albumin/Globulin Ratio 1.0 (*)     Bilirubin Total 1.2      ALP 58      ALT 28      AST 48 (*)     eGFR 28      Anion Gap 14.0      BUN/Creatinine Ratio 19     C-REACTIVE PROTEIN - Abnormal    CRP 66.50 (*)    CBC WITH DIFFERENTIAL - Abnormal    WBC 7.45      RBC 4.05 (*)     Hgb 13.4 (*)     Hct 39.1 (*)     MCV 96.5 (*)     MCH 33.1 (*)     MCHC 34.3      RDW 13.5      Platelet 153      MPV 9.0      Neut % 69.5      Lymph % 17.4      Mono % 9.3      Eos % 2.8      Basophil % 0.5      Imm Grans % 0.5      Neut # 5.17      Lymph # 1.30      Mono # 0.69      Eos # 0.21      Baso # 0.04      Imm Gran # 0.04      NRBC% 0.0     LACTIC ACID, PLASMA - Normal    Lactic Acid Level 1.8     BLOOD CULTURE  [facility]   BLOOD CULTURE [facility]   WOUND CULTURE ([facility])   CBC W/ AUTO DIFFERENTIAL    Narrative:     The following orders were created for panel order CBC auto differential.  Procedure                               Abnormality         Status                     ---------                               -----------         ------                     CBC with Differential[[id number]]       Abnormal            Final result                 Please view results for these tests on the individual orders.   MRSA PCR          Imaging Results              X-Ray Foot Complete Left (Final result)  Result time 03/09/25 08:39:48      Final result by Eduardo Frankel MD (03/09/25 08:39:48)                   Narrative:    EXAMINATION  XR FOOT COMPLETE 3 VIEW LEFT    CLINICAL HISTORY  Pain in left foot    TECHNIQUE  A total of 3 images submitted of the left foot.    COMPARISON  10 April 2024    FINDINGS  No acute osseous abnormality.  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  Visualized joints are congruent.  There is no destructive osseous process.    Widespread soft tissue swelling is present.  No tracking subcutaneous gas or evidence of radiopaque foreign body.  Skin contour irregularity at the heel may represent focal wound.    IMPRESSION  1. Skin contour abnormality of the heel may represent focal wound.  2. Widespread soft tissue swelling without acute osseous abnormality.      Electronically signed by: Eduardo Frankel  Date:    03/09/2025  Time:    08:39                                  X-Rays:   Independently Interpreted Readings:   Other Readings:  XR left foot: no acute abnormalities     Medications   vancomycin (VANCOCIN) 1,000 mg in 0.9% NaCl 250 mL IVPB (admixture device) (0 mg Intravenous Stopped 3/9/25 1057)     Followed by   vancomycin (VANCOCIN) 1,000 mg in 0.9% NaCl 250 mL IVPB (admixture device) (1,000 mg Intravenous Incomplete 3/9/25 1113)   ondansetron injection 4 mg  (has no administration in time range)   acetaminophen tablet 650 mg (has no administration in time range)   acetaminophen tablet 650 mg (has no administration in time range)   piperacillin-tazobactam (ZOSYN) 4.5 g in D5W 100 mL IVPB (MB+) (has no administration in time range)   traMADoL tablet 50 mg (has no administration in time range)   vancomycin (VANCOCIN) 1,000 mg in D5W 250 mL IVPB (admixture device) (has no administration in time range)   lactated ringers infusion ( Intravenous Incomplete 3/9/25 1107)   piperacillin-tazobactam (ZOSYN) 4.5 g in D5W 100 mL IVPB (MB+) (0 g Intravenous Stopped 3/9/25 0921)   morphine injection 4 mg (4 mg Intravenous Given 3/9/25 0844)   ondansetron injection 4 mg (4 mg Intravenous Given 3/9/25 0844)   lactated ringers bolus 1,000 mL (0 mLs Intravenous Stopped 3/9/25 1030)     Medical Decision Making  60 yo male with left foot infection on oral antibiotics     CRP elevated   Wound and blood culture obtained  Vanc and Zosyn  Labs with BROOKS  Given IVF   Admitted to hospitalist     Problems Addressed:  BROOKS (acute kidney injury): acute illness or injury that poses a threat to life or bodily functions  Left foot infection: acute illness or injury that poses a threat to life or bodily functions  Left leg cellulitis: acute illness or injury that poses a threat to life or bodily functions    Amount and/or Complexity of Data Reviewed  External Data Reviewed: notes.     Details: Chart Review- April 2024 patient required admission for wound with cellulitis. Wound culture revealed Bordetella Henzii and rare MSSA, completed Cipro and Doxy for 14 days   In Oct 2024 patient saw PCP and podiatry for ulcer infection and took Cipro with improvement   Labs: ordered. Decision-making details documented in ED Course.  Radiology: ordered and independent interpretation performed. Decision-making details documented in ED Course.    Risk  Prescription drug management.  Decision regarding  hospitalization.               ED Course as of 03/09/25 1116   [place] Mar 09, 2025   0923 CRP(!): 66.50 [KM]   0923 Lactic Acid Level: 1.8 [KM]   0927 [facility].  [KL]      ED Course User Index  [KL] Olivia King  [KM] Arlet Shukla MD                           Clinical Impression:  Final diagnoses:  [M79.672] Pain of left heel  [L08.9] Left foot infection (Primary)  [L03.116] Left leg cellulitis  [N17.9] BROOKS (acute kidney injury)          ED Disposition Condition    Admit Stable                    [1]   Social History  Tobacco Use    Smoking status: Former    Smokeless tobacco: Never    Tobacco comments:     Age started: 18; Age stopped: 54   Substance Use Topics    Alcohol use: Yes     Comment: 1-2 times per week    Drug use: Never        Arlet Shukla MD  03/09/25 1116

## 2025-03-09 NOTE — PROGRESS NOTES
"Pharmacokinetic Initial Assessment: IV Vancomycin    Assessment/Plan:    Initiate intravenous vancomycin with loading dose of 2000 mg once followed by a maintenance dose of vancomycin 1000mg IV every 24 hours  Desired empiric serum trough concentration is 10 to 20 mcg/mL  Draw vancomycin trough level 60 min prior to third dose on 03/11 at approximately 0800  Pharmacy will continue to follow and monitor vancomycin.      Please contact pharmacy at extension [phone] with any questions regarding this assessment.     Thank you for the consult,   Adele Quesada       Patient brief summary:  Kamaljit Frias is a 59 y.o. male initiated on antimicrobial therapy with IV Vancomycin for treatment of suspected skin & soft tissue infection    Drug Allergies:   Review of patient's allergies indicates:  No Known Allergies    Actual Body Weight:   Wt Readings from Last 1 Encounters:   03/09/25 111.1 kg (245 lb)       Renal Function:   Estimated Creatinine Clearance: 40.4 mL/min (A) (based on SCr of 2.51 mg/dL (H)).,     Dialysis Method (if applicable):  N/A    CBC (last 72 hours):  Recent Labs   Lab Result Units 03/09/25  0824   WBC x10(3)/mcL 7.45   Hgb g/dL 13.4*   Hct % 39.1*   Platelet x10(3)/mcL 153   Mono % % 9.3   Eos % % 2.8   Basophil % % 0.5       Metabolic Panel (last 72 hours):  Recent Labs   Lab Result Units 03/09/25  0824   Sodium mmol/L 138   Potassium mmol/L 4.4   Chloride mmol/L 104   CO2 mmol/L 20*   Glucose mg/dL 126*   Blood Urea Nitrogen mg/dL 47.7*   Creatinine mg/dL 2.51*   Albumin g/dL 3.5   Bilirubin Total mg/dL 1.2   ALP unit/L 58   AST unit/L 48*   ALT unit/L 28       Drug levels (last 3 results):  No results for input(s): "VANCOMYCINRA", "VANCORANDOM", "VANCOMYCINPE", "VANCOPEAK", "VANCOMYCINTR", "VANCOTROUGH" in the last 72 hours.    Microbiologic Results:  Microbiology Results (last 7 days)       Procedure Component Value Units Date/Time    Wound Culture [[id number]] Collected: 03/09/25 0855    Order " Status: Sent Specimen: Wound from Foot, Heel Left Updated: 03/09/25 0858    Blood Culture [[id number]] Collected: 03/09/25 0822    Order Status: Sent Specimen: Blood from Forearm, Left Updated: 03/09/25 0831    Blood Culture [[id number]] Collected: 03/09/25 0822    Order Status: Sent Specimen: Blood from Antecubital, Right Updated: 03/09/25 0831
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
## 2025-03-10 ENCOUNTER — TELEPHONE (OUTPATIENT)
Dept: PRIMARY CARE CLINIC | Facility: CLINIC | Age: 60
End: 2025-03-10
Payer: COMMERCIAL

## 2025-03-10 PROBLEM — I82.409 DVT (DEEP VENOUS THROMBOSIS): Status: RESOLVED | Noted: 2025-03-10 | Resolved: 2025-03-10

## 2025-03-10 PROBLEM — N17.9 AKI (ACUTE KIDNEY INJURY): Status: ACTIVE | Noted: 2025-03-10

## 2025-03-10 PROBLEM — I82.409 DVT (DEEP VENOUS THROMBOSIS): Status: ACTIVE | Noted: 2025-03-10

## 2025-03-10 PROBLEM — L08.9 LEFT FOOT INFECTION: Status: ACTIVE | Noted: 2025-03-10

## 2025-03-10 LAB
ALBUMIN SERPL-MCNC: 3.2 G/DL (ref 3.4–4.8)
ALBUMIN/GLOB SERPL: 1 RATIO (ref 1.1–2)
ALP SERPL-CCNC: 52 UNIT/L (ref 40–150)
ALT SERPL-CCNC: 22 UNIT/L (ref 0–55)
ANION GAP SERPL CALC-SCNC: 11 MEQ/L
AST SERPL-CCNC: 28 UNIT/L (ref 5–34)
BASOPHILS # BLD AUTO: 0.05 X10(3)/MCL
BASOPHILS NFR BLD AUTO: 0.7 %
BILIRUB SERPL-MCNC: 1.7 MG/DL
BUN SERPL-MCNC: 48.4 MG/DL (ref 8.4–25.7)
CALCIUM SERPL-MCNC: 8.7 MG/DL (ref 8.8–10)
CHLORIDE SERPL-SCNC: 103 MMOL/L (ref 98–107)
CO2 SERPL-SCNC: 25 MMOL/L (ref 23–31)
CREAT SERPL-MCNC: 2.35 MG/DL (ref 0.72–1.25)
CREAT/UREA NIT SERPL: 21
EOSINOPHIL # BLD AUTO: 0.42 X10(3)/MCL (ref 0–0.9)
EOSINOPHIL NFR BLD AUTO: 6.1 %
ERYTHROCYTE [DISTWIDTH] IN BLOOD BY AUTOMATED COUNT: 13.3 % (ref 11.5–17)
EST. AVERAGE GLUCOSE BLD GHB EST-MCNC: 142.7 MG/DL
GFR SERPLBLD CREATININE-BSD FMLA CKD-EPI: 31 ML/MIN/1.73/M2
GLOBULIN SER-MCNC: 3.2 GM/DL (ref 2.4–3.5)
GLUCOSE SERPL-MCNC: 99 MG/DL (ref 82–115)
HBA1C MFR BLD: 6.6 %
HCT VFR BLD AUTO: 38.6 % (ref 42–52)
HGB BLD-MCNC: 12.9 G/DL (ref 14–18)
IMM GRANULOCYTES # BLD AUTO: 0.03 X10(3)/MCL (ref 0–0.04)
IMM GRANULOCYTES NFR BLD AUTO: 0.4 %
LEFT ABI: 1.27
LEFT ARM BP: 138 MMHG
LEFT CFA PSV: 113 CM/S
LEFT DORSALIS PEDIS PSV: 76 CM/S
LEFT DORSALIS PEDIS: 175 MMHG
LEFT POST TIBIAL SYS PSV: 123 CM/S
LEFT POSTERIOR TIBIAL: 163 MMHG
LEFT PROFUNDA SYS PSV: 96 CM/S
LEFT SUPER FEMORAL DIST SYS PSV: 144 CM/S
LEFT SUPER FEMORAL MID SYS PSV: 136 CM/S
LEFT SUPER FEMORAL PROX SYS PSV: 94 CM/S
LEFT TIB/PER TRUNK SYS PSV: 129 CM/S
LYMPHOCYTES # BLD AUTO: 1.28 X10(3)/MCL (ref 0.6–4.6)
LYMPHOCYTES NFR BLD AUTO: 18.6 %
MCH RBC QN AUTO: 33 PG (ref 27–31)
MCHC RBC AUTO-ENTMCNC: 33.4 G/DL (ref 33–36)
MCV RBC AUTO: 98.7 FL (ref 80–94)
MONOCYTES # BLD AUTO: 0.74 X10(3)/MCL (ref 0.1–1.3)
MONOCYTES NFR BLD AUTO: 10.7 %
NEUTROPHILS # BLD AUTO: 4.37 X10(3)/MCL (ref 2.1–9.2)
NEUTROPHILS NFR BLD AUTO: 63.5 %
NRBC BLD AUTO-RTO: 0 %
OHS CV LEFT LOWER EXTREMITY ABI (NO CALC): 1.27
OHS CV RIGHT ABI LOWER EXTREMITY (NO CALC): 1.36
PLATELET # BLD AUTO: 148 X10(3)/MCL (ref 130–400)
PMV BLD AUTO: 9.2 FL (ref 7.4–10.4)
POTASSIUM SERPL-SCNC: 4.5 MMOL/L (ref 3.5–5.1)
PROT SERPL-MCNC: 6.4 GM/DL (ref 5.8–7.6)
RBC # BLD AUTO: 3.91 X10(6)/MCL (ref 4.7–6.1)
RIGHT ABI: 1.36
RIGHT ARM BP: 135 MMHG
RIGHT CFA PSV: 106 CM/S
RIGHT DORSALIS PEDIS PSV: 49 CM/S
RIGHT DORSALIS PEDIS: 170 MMHG
RIGHT POST TIBIAL SYS PSV: 74 CM/S
RIGHT POSTERIOR TIBIAL: 187 MMHG
RIGHT PROFUNDA SYS PSV: 80 CM/S
RIGHT SUPER FEMORAL DIST SYS PSV: 117 CM/S
RIGHT SUPER FEMORAL MID SYS PSV: 99 CM/S
RIGHT SUPER FEMORAL PROX SYS PSV: 75 CM/S
SODIUM SERPL-SCNC: 139 MMOL/L (ref 136–145)
WBC # BLD AUTO: 6.89 X10(3)/MCL (ref 4.5–11.5)

## 2025-03-10 PROCEDURE — 25000003 PHARM REV CODE 250

## 2025-03-10 PROCEDURE — 25000003 PHARM REV CODE 250: Performed by: NURSE PRACTITIONER

## 2025-03-10 PROCEDURE — 3E0234Z INTRODUCTION OF SERUM, TOXOID AND VACCINE INTO MUSCLE, PERCUTANEOUS APPROACH: ICD-10-PCS | Performed by: INTERNAL MEDICINE

## 2025-03-10 PROCEDURE — 90471 IMMUNIZATION ADMIN: CPT | Performed by: INTERNAL MEDICINE

## 2025-03-10 PROCEDURE — 85025 COMPLETE CBC W/AUTO DIFF WBC: CPT | Performed by: NURSE PRACTITIONER

## 2025-03-10 PROCEDURE — 63600175 PHARM REV CODE 636 W HCPCS: Performed by: NURSE PRACTITIONER

## 2025-03-10 PROCEDURE — 63600175 PHARM REV CODE 636 W HCPCS: Performed by: INTERNAL MEDICINE

## 2025-03-10 PROCEDURE — 36415 COLL VENOUS BLD VENIPUNCTURE: CPT | Performed by: INTERNAL MEDICINE

## 2025-03-10 PROCEDURE — 90715 TDAP VACCINE 7 YRS/> IM: CPT | Performed by: INTERNAL MEDICINE

## 2025-03-10 PROCEDURE — 80053 COMPREHEN METABOLIC PANEL: CPT | Performed by: NURSE PRACTITIONER

## 2025-03-10 PROCEDURE — 25000003 PHARM REV CODE 250: Performed by: INTERNAL MEDICINE

## 2025-03-10 PROCEDURE — 11000001 HC ACUTE MED/SURG PRIVATE ROOM

## 2025-03-10 PROCEDURE — 83036 HEMOGLOBIN GLYCOSYLATED A1C: CPT | Performed by: INTERNAL MEDICINE

## 2025-03-10 RX ORDER — CALCIUM GLUCONATE 20 MG/ML
1 INJECTION, SOLUTION INTRAVENOUS ONCE
Status: COMPLETED | OUTPATIENT
Start: 2025-03-10 | End: 2025-03-10

## 2025-03-10 RX ORDER — AMLODIPINE BESYLATE 5 MG/1
5 TABLET ORAL DAILY
Status: DISCONTINUED | OUTPATIENT
Start: 2025-03-10 | End: 2025-03-11

## 2025-03-10 RX ORDER — ELECTROLYTES/DEXTROSE
200 SOLUTION, ORAL ORAL
Status: DISCONTINUED | OUTPATIENT
Start: 2025-03-10 | End: 2025-03-14 | Stop reason: HOSPADM

## 2025-03-10 RX ORDER — AMOXICILLIN 250 MG
1 CAPSULE ORAL DAILY
Status: DISCONTINUED | OUTPATIENT
Start: 2025-03-10 | End: 2025-03-14 | Stop reason: HOSPADM

## 2025-03-10 RX ADMIN — CLOSTRIDIUM TETANI TOXOID ANTIGEN (FORMALDEHYDE INACTIVATED), CORYNEBACTERIUM DIPHTHERIAE TOXOID ANTIGEN (FORMALDEHYDE INACTIVATED), BORDETELLA PERTUSSIS TOXOID ANTIGEN (GLUTARALDEHYDE INACTIVATED), BORDETELLA PERTUSSIS FILAMENTOUS HEMAGGLUTININ ANTIGEN (FORMALDEHYDE INACTIVATED), BORDETELLA PERTUSSIS PERTACTIN ANTIGEN, AND BORDETELLA PERTUSSIS FIMBRIAE 2/3 ANTIGEN 0.5 ML: 5; 2; 2.5; 5; 3; 5 INJECTION, SUSPENSION INTRAMUSCULAR at 03:03

## 2025-03-10 RX ADMIN — SODIUM CHLORIDE, POTASSIUM CHLORIDE, SODIUM LACTATE AND CALCIUM CHLORIDE: 600; 310; 30; 20 INJECTION, SOLUTION INTRAVENOUS at 06:03

## 2025-03-10 RX ADMIN — PIPERACILLIN SODIUM AND TAZOBACTAM SODIUM 4.5 G: 4; .5 INJECTION, POWDER, LYOPHILIZED, FOR SOLUTION INTRAVENOUS at 10:03

## 2025-03-10 RX ADMIN — Medication 200 ML: at 03:03

## 2025-03-10 RX ADMIN — SODIUM CHLORIDE, POTASSIUM CHLORIDE, SODIUM LACTATE AND CALCIUM CHLORIDE: 600; 310; 30; 20 INJECTION, SOLUTION INTRAVENOUS at 10:03

## 2025-03-10 RX ADMIN — Medication 200 ML: at 10:03

## 2025-03-10 RX ADMIN — HYDROCODONE BITARTRATE AND ACETAMINOPHEN 1 TABLET: 7.5; 325 TABLET ORAL at 06:03

## 2025-03-10 RX ADMIN — SENNOSIDES AND DOCUSATE SODIUM 1 TABLET: 50; 8.6 TABLET ORAL at 11:03

## 2025-03-10 RX ADMIN — PIPERACILLIN SODIUM AND TAZOBACTAM SODIUM 4.5 G: 4; .5 INJECTION, POWDER, LYOPHILIZED, FOR SOLUTION INTRAVENOUS at 05:03

## 2025-03-10 RX ADMIN — CALCIUM GLUCONATE 1 G: 20 INJECTION, SOLUTION INTRAVENOUS at 03:03

## 2025-03-10 RX ADMIN — HYDROCODONE BITARTRATE AND ACETAMINOPHEN 1 TABLET: 7.5; 325 TABLET ORAL at 11:03

## 2025-03-10 RX ADMIN — VANCOMYCIN HYDROCHLORIDE 1000 MG: 1 INJECTION, POWDER, LYOPHILIZED, FOR SOLUTION INTRAVENOUS at 09:03

## 2025-03-10 RX ADMIN — HYDROCODONE BITARTRATE AND ACETAMINOPHEN 1 TABLET: 7.5; 325 TABLET ORAL at 05:03

## 2025-03-10 RX ADMIN — AMLODIPINE BESYLATE 5 MG: 5 TABLET ORAL at 03:03

## 2025-03-10 RX ADMIN — PIPERACILLIN SODIUM AND TAZOBACTAM SODIUM 4.5 G: 4; .5 INJECTION, POWDER, LYOPHILIZED, FOR SOLUTION INTRAVENOUS at 03:03
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
## 2025-03-10 NOTE — PLAN OF CARE
Patient lives in car his sister takes him in when his health is bad but he is afraid to mess up her detention. He is still working but work is slow. He has health insurance through his job. Contacted Kevin BONILLA to give patient resources.

## 2025-03-10 NOTE — PLAN OF CARE
Problem: Adult Inpatient Plan of Care  Goal: Plan of Care Review  3/9/2025 2331 by Glenna Nettles RN  Outcome: Progressing  3/9/2025 2331 by Glenna Nettles RN  Outcome: Progressing  Goal: Patient-Specific Goal (Individualized)  3/9/2025 2331 by Glenna Nettles RN  Outcome: Progressing  3/9/2025 2331 by Glenna Nettles RN  Outcome: Progressing  Goal: Absence of Hospital-Acquired Illness or Injury  3/9/2025 2331 by Glenna Nettles RN  Outcome: Progressing  3/9/2025 2331 by Glenna Nettles RN  Outcome: Progressing  Goal: Optimal Comfort and Wellbeing  3/9/2025 2331 by Glenna Nettles RN  Outcome: Progressing  3/9/2025 2331 by Glenna Nettles RN  Outcome: Progressing  Goal: Readiness for Transition of Care  3/9/2025 2331 by Glenna Nettles RN  Outcome: Progressing  3/9/2025 2331 by Glenna Nettles RN  Outcome: Progressing     Problem: Wound  Goal: Optimal Coping  3/9/2025 2331 by Glenna Nettles RN  Outcome: Progressing  3/9/2025 2331 by Glenna Nettles RN  Outcome: Progressing  Goal: Optimal Functional Ability  3/9/2025 2331 by Glenna Nettles RN  Outcome: Progressing  3/9/2025 2331 by Glenna Nettles RN  Outcome: Progressing  Goal: Absence of Infection Signs and Symptoms  3/9/2025 2331 by Glenna Nettles RN  Outcome: Progressing  3/9/2025 2331 by Glenna Nettles RN  Outcome: Progressing  Goal: Improved Oral Intake  3/9/2025 2331 by Glenna Nettles RN  Outcome: Progressing  3/9/2025 2331 by Glenna Nettles RN  Outcome: Progressing  Goal: Optimal Pain Control and Function  3/9/2025 2331 by Glenna Nettles RN  Outcome: Progressing  3/9/2025 2331 by Glenna Nettles RN  Outcome: Progressing  Goal: Skin Health and Integrity  3/9/2025 2331 by Glnena Nettles RN  Outcome: Progressing  3/9/2025 2331 by Glenna Nettles RN  Outcome: Progressing  Goal: Optimal Wound Healing  3/9/2025 2331 by Glenna Nettles RN  Outcome: Progressing  3/9/2025 2331 by Glenna Nettles RN  Outcome: Progressing

## 2025-03-10 NOTE — TELEPHONE ENCOUNTER
----- Message from Yakelin sent at 3/10/2025 10:37 AM CDT -----  .Who Called: Kamaljit Griggs Method of Contact: Phone CallPatient's Preferred Phone Number on File: [phone] Best Call Back Number, if different:Additional Information: pt call to say he in hospital for same left heel at [facility]
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
## 2025-03-10 NOTE — PLAN OF CARE
03/10/25 1204   Discharge Assessment   Assessment Type Discharge Planning Assessment   Confirmed/corrected address, phone number and insurance   (only has a po box lives in his care and stays with his sister when he is sick)   Source of Information patient   Communicated GAMA with patient/caregiver Date not available/Unable to determine   Reason For Admission left foor tinfection, cellulitis,   People in Home alone   Do you expect to return to your current living situation? Other (see comments)  (will stay with his sister to recover)   Do you have help at home or someone to help you manage your care at home? Yes   Who are your caregiver(s) and their phone number(s)? sister [place]   Prior to hospitilization cognitive status: Alert/Oriented   Current cognitive status: Alert/Oriented   Walking or Climbing Stairs Difficulty no   Dressing/Bathing Difficulty no   Equipment Currently Used at Home none   Readmission within 30 days? No   Do you currently have service(s) that help you manage your care at home? No   Do you take prescription medications? Yes   Do you have prescription coverage? Yes   Coverage private insurance   Do you have any problems affording any of your prescribed medications? No   Is the patient taking medications as prescribed? yes   How do you get to doctors appointments? car, drives self   Are you on dialysis? No   Do you take coumadin? No   Discharge Plan A Home Health   Discharge Plan B Home Health;Home with family   DME Needed Upon Discharge  none   Discharge Plan discussed with: Patient   Transition of Care Barriers Homeless;Social   Financial Resource Strain   How hard is it for you to pay for the very basics like food, housing, medical care, and heating? Very Hard   Housing Stability   In the last 12 months, was there a time when you were not able to pay the mortgage or rent on time? Y   At any time in the past 12 months, were you homeless or living in a shelter (including now)? Y    Transportation Needs   Has the lack of transportation kept you from medical appointments, meetings, work or from getting things needed for daily living? No   Food Insecurity   Within the past 12 months, you worried that your food would run out before you got the money to buy more. Often true   Within the past 12 months, the food you bought just didn't last and you didn't have money to get more. Often true   Stress   Do you feel stress - tense, restless, nervous, or anxious, or unable to sleep at night because your mind is troubled all the time - these days? Very much   Social Isolation   How often do you feel lonely or isolated from those around you?  Often   Utilities   In the past 12 months has the electric, gas, oil, or water company threatened to shut off services in your home? Yes   Health Literacy   How often do you need to have someone help you when you read instructions, pamphlets, or other written material from your doctor or pharmacy? Never

## 2025-03-10 NOTE — PLAN OF CARE
Problem: Adult Inpatient Plan of Care  Goal: Plan of Care Review  Outcome: Progressing  Goal: Patient-Specific Goal (Individualized)  Outcome: Progressing  Goal: Absence of Hospital-Acquired Illness or Injury  Outcome: Progressing  Goal: Optimal Comfort and Wellbeing  Outcome: Progressing  Goal: Readiness for Transition of Care  Outcome: Progressing     Problem: Wound  Goal: Optimal Coping  Outcome: Progressing  Goal: Optimal Functional Ability  Outcome: Progressing  Goal: Absence of Infection Signs and Symptoms  Outcome: Progressing  Goal: Improved Oral Intake  Outcome: Progressing  Goal: Optimal Pain Control and Function  Outcome: Progressing  Goal: Skin Health and Integrity  Outcome: Progressing  Goal: Optimal Wound Healing  Outcome: Progressing     Problem: Acute Kidney Injury/Impairment  Goal: Fluid and Electrolyte Balance  Outcome: Progressing  Goal: Improved Oral Intake  Outcome: Progressing  Goal: Effective Renal Function  Outcome: Progressing

## 2025-03-11 ENCOUNTER — ANESTHESIA (OUTPATIENT)
Dept: SURGERY | Facility: HOSPITAL | Age: 60
End: 2025-03-11
Payer: COMMERCIAL

## 2025-03-11 ENCOUNTER — ANESTHESIA EVENT (OUTPATIENT)
Dept: SURGERY | Facility: HOSPITAL | Age: 60
End: 2025-03-11
Payer: COMMERCIAL

## 2025-03-11 LAB
ANION GAP SERPL CALC-SCNC: 11 MEQ/L
BACTERIA WND CULT: NORMAL
BUN SERPL-MCNC: 39.4 MG/DL (ref 8.4–25.7)
CALCIUM SERPL-MCNC: 9.7 MG/DL (ref 8.8–10)
CHLORIDE SERPL-SCNC: 102 MMOL/L (ref 98–107)
CO2 SERPL-SCNC: 26 MMOL/L (ref 23–31)
CREAT SERPL-MCNC: 2.18 MG/DL (ref 0.72–1.25)
CREAT/UREA NIT SERPL: 18
ERYTHROCYTE [DISTWIDTH] IN BLOOD BY AUTOMATED COUNT: 13 % (ref 11.5–17)
GFR SERPLBLD CREATININE-BSD FMLA CKD-EPI: 34 ML/MIN/1.73/M2
GLUCOSE SERPL-MCNC: 111 MG/DL (ref 82–115)
GRAM STN SPEC: NORMAL
HCT VFR BLD AUTO: 38.6 % (ref 42–52)
HGB BLD-MCNC: 13.2 G/DL (ref 14–18)
MCH RBC QN AUTO: 32.8 PG (ref 27–31)
MCHC RBC AUTO-ENTMCNC: 34.2 G/DL (ref 33–36)
MCV RBC AUTO: 96 FL (ref 80–94)
NRBC BLD AUTO-RTO: 0 %
PLATELET # BLD AUTO: 163 X10(3)/MCL (ref 130–400)
PMV BLD AUTO: 8.6 FL (ref 7.4–10.4)
POTASSIUM SERPL-SCNC: 4.2 MMOL/L (ref 3.5–5.1)
RBC # BLD AUTO: 4.02 X10(6)/MCL (ref 4.7–6.1)
SODIUM SERPL-SCNC: 139 MMOL/L (ref 136–145)
VANCOMYCIN TROUGH SERPL-MCNC: 12.8 UG/ML (ref 15–20)
WBC # BLD AUTO: 6.65 X10(3)/MCL (ref 4.5–11.5)

## 2025-03-11 PROCEDURE — 80202 ASSAY OF VANCOMYCIN: CPT | Performed by: INTERNAL MEDICINE

## 2025-03-11 PROCEDURE — 85027 COMPLETE CBC AUTOMATED: CPT | Performed by: INTERNAL MEDICINE

## 2025-03-11 PROCEDURE — 36000706: Performed by: PODIATRIST

## 2025-03-11 PROCEDURE — D9220A PRA ANESTHESIA: Mod: ANES,,, | Performed by: ANESTHESIOLOGY

## 2025-03-11 PROCEDURE — 25000003 PHARM REV CODE 250: Performed by: NURSE ANESTHETIST, CERTIFIED REGISTERED

## 2025-03-11 PROCEDURE — 63600175 PHARM REV CODE 636 W HCPCS: Mod: TB | Performed by: NURSE ANESTHETIST, CERTIFIED REGISTERED

## 2025-03-11 PROCEDURE — 80048 BASIC METABOLIC PNL TOTAL CA: CPT | Performed by: INTERNAL MEDICINE

## 2025-03-11 PROCEDURE — 63600175 PHARM REV CODE 636 W HCPCS: Performed by: NURSE PRACTITIONER

## 2025-03-11 PROCEDURE — 37000009 HC ANESTHESIA EA ADD 15 MINS: Performed by: PODIATRIST

## 2025-03-11 PROCEDURE — 25000003 PHARM REV CODE 250: Performed by: INTERNAL MEDICINE

## 2025-03-11 PROCEDURE — 87075 CULTR BACTERIA EXCEPT BLOOD: CPT | Performed by: PODIATRIST

## 2025-03-11 PROCEDURE — 63600175 PHARM REV CODE 636 W HCPCS: Performed by: PODIATRIST

## 2025-03-11 PROCEDURE — 36415 COLL VENOUS BLD VENIPUNCTURE: CPT | Performed by: INTERNAL MEDICINE

## 2025-03-11 PROCEDURE — 87102 FUNGUS ISOLATION CULTURE: CPT | Performed by: PODIATRIST

## 2025-03-11 PROCEDURE — 0JBR0ZZ EXCISION OF LEFT FOOT SUBCUTANEOUS TISSUE AND FASCIA, OPEN APPROACH: ICD-10-PCS | Performed by: PODIATRIST

## 2025-03-11 PROCEDURE — 37000008 HC ANESTHESIA 1ST 15 MINUTES: Performed by: PODIATRIST

## 2025-03-11 PROCEDURE — 25000003 PHARM REV CODE 250: Performed by: NURSE PRACTITIONER

## 2025-03-11 PROCEDURE — 25000003 PHARM REV CODE 250

## 2025-03-11 PROCEDURE — 87205 SMEAR GRAM STAIN: CPT | Performed by: PODIATRIST

## 2025-03-11 PROCEDURE — 25000003 PHARM REV CODE 250: Performed by: PODIATRIST

## 2025-03-11 PROCEDURE — 63600175 PHARM REV CODE 636 W HCPCS: Performed by: INTERNAL MEDICINE

## 2025-03-11 PROCEDURE — 11000001 HC ACUTE MED/SURG PRIVATE ROOM

## 2025-03-11 PROCEDURE — 36000707: Performed by: PODIATRIST

## 2025-03-11 PROCEDURE — 87070 CULTURE OTHR SPECIMN AEROBIC: CPT | Performed by: PODIATRIST

## 2025-03-11 PROCEDURE — D9220A PRA ANESTHESIA: Mod: CRNA,,, | Performed by: NURSE ANESTHETIST, CERTIFIED REGISTERED

## 2025-03-11 RX ORDER — PROCHLORPERAZINE EDISYLATE 5 MG/ML
5 INJECTION INTRAMUSCULAR; INTRAVENOUS EVERY 30 MIN PRN
Status: CANCELLED | OUTPATIENT
Start: 2025-03-11

## 2025-03-11 RX ORDER — GLYCOPYRROLATE 0.2 MG/ML
INJECTION INTRAMUSCULAR; INTRAVENOUS
Status: DISCONTINUED | OUTPATIENT
Start: 2025-03-11 | End: 2025-03-11

## 2025-03-11 RX ORDER — AMLODIPINE BESYLATE 5 MG/1
10 TABLET ORAL DAILY
Status: DISCONTINUED | OUTPATIENT
Start: 2025-03-11 | End: 2025-03-14 | Stop reason: HOSPADM

## 2025-03-11 RX ORDER — GLUCAGON 1 MG
1 KIT INJECTION
Status: CANCELLED | OUTPATIENT
Start: 2025-03-11

## 2025-03-11 RX ORDER — PROPOFOL 10 MG/ML
VIAL (ML) INTRAVENOUS CONTINUOUS PRN
Status: DISCONTINUED | OUTPATIENT
Start: 2025-03-11 | End: 2025-03-11

## 2025-03-11 RX ORDER — ENOXAPARIN SODIUM 100 MG/ML
40 INJECTION SUBCUTANEOUS EVERY 24 HOURS
Status: DISCONTINUED | OUTPATIENT
Start: 2025-03-11 | End: 2025-03-14 | Stop reason: HOSPADM

## 2025-03-11 RX ORDER — KETOROLAC TROMETHAMINE 30 MG/ML
15 INJECTION, SOLUTION INTRAMUSCULAR; INTRAVENOUS EVERY 6 HOURS PRN
Status: DISCONTINUED | OUTPATIENT
Start: 2025-03-11 | End: 2025-03-14 | Stop reason: HOSPADM

## 2025-03-11 RX ORDER — HYDROCODONE BITARTRATE AND ACETAMINOPHEN 7.5; 325 MG/1; MG/1
1 TABLET ORAL EVERY 4 HOURS PRN
Refills: 0 | Status: DISCONTINUED | OUTPATIENT
Start: 2025-03-11 | End: 2025-03-14 | Stop reason: HOSPADM

## 2025-03-11 RX ORDER — LIDOCAINE HYDROCHLORIDE 20 MG/ML
INJECTION, SOLUTION EPIDURAL; INFILTRATION; INTRACAUDAL; PERINEURAL
Status: DISCONTINUED | OUTPATIENT
Start: 2025-03-11 | End: 2025-03-11

## 2025-03-11 RX ORDER — BUPIVACAINE HYDROCHLORIDE 5 MG/ML
INJECTION, SOLUTION EPIDURAL; INTRACAUDAL; PERINEURAL
Status: DISCONTINUED | OUTPATIENT
Start: 2025-03-11 | End: 2025-03-11 | Stop reason: HOSPADM

## 2025-03-11 RX ORDER — MORPHINE SULFATE 4 MG/ML
2 INJECTION, SOLUTION INTRAMUSCULAR; INTRAVENOUS EVERY 5 MIN PRN
Refills: 0 | Status: CANCELLED | OUTPATIENT
Start: 2025-03-11

## 2025-03-11 RX ADMIN — VANCOMYCIN HYDROCHLORIDE 1000 MG: 1 INJECTION, POWDER, LYOPHILIZED, FOR SOLUTION INTRAVENOUS at 08:03

## 2025-03-11 RX ADMIN — AMLODIPINE BESYLATE 10 MG: 5 TABLET ORAL at 08:03

## 2025-03-11 RX ADMIN — PROPOFOL 99 MCG/KG/MIN: 10 INJECTION, EMULSION INTRAVENOUS at 04:03

## 2025-03-11 RX ADMIN — Medication 200 ML: at 08:03

## 2025-03-11 RX ADMIN — HYDROCODONE BITARTRATE AND ACETAMINOPHEN 1 TABLET: 7.5; 325 TABLET ORAL at 06:03

## 2025-03-11 RX ADMIN — PIPERACILLIN SODIUM AND TAZOBACTAM SODIUM 4.5 G: 4; .5 INJECTION, POWDER, LYOPHILIZED, FOR SOLUTION INTRAVENOUS at 06:03

## 2025-03-11 RX ADMIN — GLYCOPYRROLATE 0.1 MG: 0.2 INJECTION INTRAMUSCULAR; INTRAVENOUS at 04:03

## 2025-03-11 RX ADMIN — SODIUM CHLORIDE, SODIUM GLUCONATE, SODIUM ACETATE, POTASSIUM CHLORIDE AND MAGNESIUM CHLORIDE: 526; 502; 368; 37; 30 INJECTION, SOLUTION INTRAVENOUS at 04:03

## 2025-03-11 RX ADMIN — PIPERACILLIN SODIUM AND TAZOBACTAM SODIUM 4.5 G: 4; .5 INJECTION, POWDER, LYOPHILIZED, FOR SOLUTION INTRAVENOUS at 02:03

## 2025-03-11 RX ADMIN — HYDROCODONE BITARTRATE AND ACETAMINOPHEN 1 TABLET: 7.5; 325 TABLET ORAL at 12:03

## 2025-03-11 RX ADMIN — SENNOSIDES AND DOCUSATE SODIUM 1 TABLET: 50; 8.6 TABLET ORAL at 08:03

## 2025-03-11 RX ADMIN — LIDOCAINE HYDROCHLORIDE 80 MG: 20 INJECTION, SOLUTION INTRAVENOUS at 04:03

## 2025-03-11 RX ADMIN — PIPERACILLIN SODIUM AND TAZOBACTAM SODIUM 4.5 G: 4; .5 INJECTION, POWDER, LYOPHILIZED, FOR SOLUTION INTRAVENOUS at 11:03

## 2025-03-11 RX ADMIN — PROPOFOL 50 MG: 10 INJECTION, EMULSION INTRAVENOUS at 04:03

## 2025-03-11 RX ADMIN — HYDROCODONE BITARTRATE AND ACETAMINOPHEN 1 TABLET: 7.5; 325 TABLET ORAL at 11:03

## 2025-03-11 NOTE — OP NOTE
[facility]                       [address] [place]                      [place] [address]    PATIENT NAME:      JULISSA SCOTT   YOB: 1965  CSN:               [id number]  MRN:               [id number]  ADMIT DATE:        03/09/2025 08:01:00  PHYSICIAN:         Get Enriquez DPM                          OPERATIVE REPORT      DATE OF SURGERY:    03/11/2025 00:00:00    SURGEON:  Get Enriquez DPM    PREOPERATIVE DIAGNOSIS:  Necrotic left heel wound with possible infectious   process.    POSTOPERATIVE DIAGNOSIS:  Necrotic left heel wound with possible infectious   process.    PROCEDURE:  Excisional debridement, left heel wound.    ANESTHESIA:  Local with MAC.    HEMOSTASIS:  None.    ESTIMATED BLOOD LOSS:  Less than 10 cc.    MATERIALS:  None.    INJECTABLES:  None.    PATHOLOGY:  Debridement products sent for gross and micro along with culture and   sensitivity.    COMPLICATIONS:  None.    DESCRIPTION OF PROCEDURE:  The patient was transferred from the floor to   holding, had been n.p.o. past midnight.  History and physical, preop studies   reviewed and there were no contraindications noted.  While in holding,   appropriate IVs were started.  The patient was taken to the OR and placed in   supine position, after which appropriate anesthetics were administered.  This   was supplemented with a total of 10 cc of 0.5% Marcaine plain infiltrated in the   left ankle block.    Extremity was then prepped and draped aseptically.    Attention was directed to the left heel, where there was a large wound to the   plantar aspect of the heel with a necrotic central aspect.  Margins were viable   extending out over approximately a 6 cm diameter, the central area, again   nonviable.  Utilizing scalpel forceps, we incised circumferentially around the   nonviable area, which there was necrotic tissue extending down into the mid   subcutaneous layer  approximately about 1 cm in depth.  There was good viability   and bleeding throughout.  No evidence of any retained abscess.  No tracking or   undermining to the area.  No crepitation to the surrounding tissues.  Cultures   were obtained of the tissues.  Good viability throughout.  Wounds were copiously   irrigated, after which hemostasis was obtained.  Again, wound dimensions were   approximately 6 x 6.  Once we had adequate hemostasis, the wounds were then   dressed aseptically.    The patient tolerated the procedure and anesthesia well, left the OR to recovery   room with vital signs stable and vascular status intact to the left lower   extremity.  Once recovered, the patient will be transferred back to the floor   for postop management.        ______________________________  DANIEL Monroe/TERRI  DD:  03/11/2025  Time:  05:24PM  DT:  03/11/2025  Time:  05:50PM  Job #:  [id number]      OPERATIVE REPORT

## 2025-03-11 NOTE — PLAN OF CARE
Community Health Worker introduced self and role to pt. 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 Pt also states if it gets to cold or he is sick his sister does allow him to stay at her house. Provided pt with a list of financial and food resources. Provided pt with 232-HELP number to get him started with low income housing. Also provided pt with a list of homeless shelters, pt states he has been to [facility] and feels more comfortable staying in his car.

## 2025-03-11 NOTE — PROGRESS NOTES
Pharmacokinetic Assessment Follow Up: IV Vancomycin    Vancomycin serum concentration assessment(s):    The trough level was drawn correctly and can be used to guide therapy at this time. The measurement is within the desired definitive target range of 10 to 20 mcg/mL.    Vancomycin Regimen Plan:    Continue regimen to Vancomycin 1000 mg IV every 24 hours with next serum trough concentration measured at 0800 prior to 3rd dose on 03/13    Scheduled Administration Times        Drug levels (last 3 results):  Recent Labs   Lab Result Units 03/11/25  0753   Vancomycin Trough ug/ml 12.8*       Vancomycin Administrations:  vancomycin given in the last 96 hours                     vancomycin (VANCOCIN) 1,000 mg in D5W 250 mL IVPB (admixture device) (mg) 1,000 mg New Bag 03/10/25 0944    vancomycin (VANCOCIN) 1,000 mg in 0.9% NaCl 250 mL IVPB (admixture device) (mg) 1,000 mg New Bag 03/09/25 1118    vancomycin (VANCOCIN) 1,000 mg in 0.9% NaCl 250 mL IVPB (admixture device) (mg) 1,000 mg New Bag 03/09/25 0927                    Pharmacy will continue to follow and monitor vancomycin.    Please contact pharmacy at extension [phone] for questions regarding this assessment.    Thank you for the consult,   Cornelio Huggins       Patient brief summary:  Kamaljit Frias is a 59 y.o. male initiated on antimicrobial therapy with IV Vancomycin for treatment of skin & soft tissue infection    The patient's current regimen is 1000mg Q24    Drug Allergies:   Review of patient's allergies indicates:  No Known Allergies    Actual Body Weight:  Wt Readings from Last 1 Encounters:   03/09/25 111.1 kg (244 lb 14.9 oz)       Renal Function:   Estimated Creatinine Clearance: 46.5 mL/min (A) (based on SCr of 2.18 mg/dL (H)).,     Dialysis Method (if applicable):  N/A    CBC (last 72 hours):  Recent Labs   Lab Result Units 03/09/25  0824 03/10/25  0344 03/11/25  0350   WBC x10(3)/mcL 7.45 6.89 6.65   Hgb g/dL 13.4* 12.9* 13.2*   Hemoglobin A1c %  --   6.6  --    Hct % 39.1* 38.6* 38.6*   Platelet x10(3)/mcL 153 148 163   Mono % % 9.3 10.7  --    Eos % % 2.8 6.1  --    Basophil % % 0.5 0.7  --        Metabolic Panel (last 72 hours):  Recent Labs   Lab Result Units 03/09/25  0824 03/09/25  1543 03/10/25  0344 03/11/25  0350   Sodium mmol/L 138  --  139 139   Urine Sodium mmol/L  --  84.0  --   --    Potassium mmol/L 4.4  --  4.5 4.2   Chloride mmol/L 104  --  103 102   CO2 mmol/L 20*  --  25 26   Glucose mg/dL 126*  --  99 111   Glucose, UA   --  Normal  --   --    Blood Urea Nitrogen mg/dL 47.7*  --  48.4* 39.4*   Creatinine mg/dL 2.51*  --  2.35* 2.18*   Urine Creatinine mg/dL  --  48.7*  --   --    Albumin g/dL 3.5  --  3.2*  --    Bilirubin Total mg/dL 1.2  --  1.7*  --    ALP unit/L 58  --  52  --    AST unit/L 48*  --  28  --    ALT unit/L 28  --  22  --        Microbiologic Results:  Microbiology Results (last 7 days)       Procedure Component Value Units Date/Time    Blood Culture [[id number]]  (Normal) Collected: 03/09/25 0822    Order Status: Completed Specimen: Blood from Forearm, Left Updated: 03/10/25 1502     Blood Culture No Growth At 24 Hours    Blood Culture [[id number]]  (Normal) Collected: 03/09/25 0822    Order Status: Completed Specimen: Blood from Antecubital, Right Updated: 03/10/25 1502     Blood Culture No Growth At 24 Hours    Wound Culture [[id number]] Collected: 03/09/25 0855    Order Status: Completed Specimen: Wound from Foot, Heel Left Updated: 03/10/25 1027     Wound Culture Normal Skin Delma, no further workup.
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
## 2025-03-11 NOTE — ANESTHESIA PREPROCEDURE EVALUATION
03/11/2025  Kamaljit Frias is a 59 y.o., male , who presents for the following:    Procedure: DEBRIDEMENT, FOOT (Left: Foot)   Anesthesia type: Local MAC   Diagnosis: Wound infection [T14.8XXA, L08.9]   Pre-op diagnosis: Wound infection [T14.8XXA, L08.9]   Location: [facility] OR  / [facility] OR   Surgeons: Get Enriquez DPM       HISTORY OF PRESENT ILLNESS:   59 y.o. 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  Reportedly saw his PCP and has been on Cipro and doxycycline (prescribed 02/24/2025) without improvement.  Denied any fevers.  Patient presents for I and D of left heel      Past Medical History:   Diagnosis Date    Essential (primary) hypertension     Neuropathy of lower extremity      LAB:          Pre-op Assessment    I have reviewed the Patient Summary Reports.     I have reviewed the Nursing Notes. I have reviewed the NPO Status.   I have reviewed the Medications.     Review of Systems  Anesthesia Hx:  No problems with previous Anesthesia             Denies Family Hx of Anesthesia complications.    Denies Personal Hx of Anesthesia complications.                    Social:  Former Smoker       Cardiovascular:     Hypertension, poorly controlled                                          Pulmonary:        Sleep Apnea Does not use CPAP               Renal/:  Chronic Renal Disease   BROOKS             Musculoskeletal:  Arthritis   OA  significant sciatic injury of the left lower extremity with resultant neuropathy and lower extremity dysfunction              Endocrine:     Prediabetes      Obesity / BMI > 30      Physical Exam  General: Alert and Oriented    Airway:  Mallampati: II   Mouth Opening:  Normal  TM Distance: Normal  Tongue: Normal  Neck ROM: Normal ROM    Dental:  Intact    Chest/Lungs:  Normal Respiratory Rate    Heart:  Rate: Normal  Rhythm: Regular Rhythm        Anesthesia Plan  Type of Anesthesia, risks & benefits discussed:    Anesthesia Type: Gen Natural Airway  Intra-op Monitoring Plan: Standard ASA Monitors  Post Op Pain Control Plan: IV/PO Opioids PRN  Induction:  IV  Airway Plan: Direct  Informed Consent: Informed consent signed with the Patient and all parties understand the risks and agree with anesthesia plan.  All questions answered. Patient consented to blood products? No  ASA Score: 3  Day of Surgery Review of History & Physical: H&P Update referred to the surgeon/provider.  Anesthesia Plan Notes: Nasal cannula vs facemask supplemental oxygenation   TIVA, w/ local anesthetic block per podiatrist      Ready For Surgery From Anesthesia Perspective.     .

## 2025-03-11 NOTE — TRANSFER OF CARE
"Anesthesia Transfer of Care Note    Patient: Kamaljit Frias    Procedure(s) Performed: Procedure(s) (LRB):  DEBRIDEMENT, FOOT (Left)    Patient location: [facility] Surgical Floor    Anesthesia Type: MAC    Transport from OR: Transported from OR on room air with adequate spontaneous ventilation    Post pain: adequate analgesia    Post assessment: no apparent anesthetic complications    Post vital signs: stable    Level of consciousness: awake    Nausea/Vomiting: no nausea/vomiting    Complications: none    Transfer of care protocol was followed      Last vitals: Visit Vitals  BP (!) 173/115   Pulse 79   Temp 36.7 °C (98.1 °F) (Oral)   Resp 18   Ht 6' 1" (1.854 m)   Wt 111.1 kg (244 lb 14.9 oz)   SpO2 95%   BMI 32.31 kg/m²     "

## 2025-03-11 NOTE — PLAN OF CARE
Problem: Adult Inpatient Plan of Care  Goal: Plan of Care Review  Outcome: Progressing  Goal: Patient-Specific Goal (Individualized)  Outcome: Progressing  Goal: Absence of Hospital-Acquired Illness or Injury  Outcome: Progressing  Goal: Optimal Comfort and Wellbeing  Outcome: Progressing  Goal: Readiness for Transition of Care  Outcome: Progressing     Problem: Wound  Goal: Optimal Coping  Outcome: Progressing  Goal: Optimal Functional Ability  Outcome: Progressing  Goal: Absence of Infection Signs and Symptoms  Outcome: Progressing  Goal: Improved Oral Intake  Outcome: Progressing  Goal: Optimal Pain Control and Function  Outcome: Progressing  Goal: Skin Health and Integrity  Outcome: Progressing  Goal: Optimal Wound Healing  Outcome: Progressing     Problem: Acute Kidney Injury/Impairment  Goal: Fluid and Electrolyte Balance  Outcome: Progressing  Goal: Improved Oral Intake  Outcome: Progressing  Goal: Effective Renal Function  Outcome: Progressing     Problem: Fall Injury Risk  Goal: Absence of Fall and Fall-Related Injury  Outcome: Progressing

## 2025-03-12 LAB
ANION GAP SERPL CALC-SCNC: 11 MEQ/L
BUN SERPL-MCNC: 30 MG/DL (ref 8.4–25.7)
CALCIUM SERPL-MCNC: 9.1 MG/DL (ref 8.8–10)
CHLORIDE SERPL-SCNC: 99 MMOL/L (ref 98–107)
CO2 SERPL-SCNC: 27 MMOL/L (ref 23–31)
CREAT SERPL-MCNC: 2.12 MG/DL (ref 0.72–1.25)
CREAT/UREA NIT SERPL: 14
ERYTHROCYTE [DISTWIDTH] IN BLOOD BY AUTOMATED COUNT: 13.1 % (ref 11.5–17)
GFR SERPLBLD CREATININE-BSD FMLA CKD-EPI: 35 ML/MIN/1.73/M2
GLUCOSE SERPL-MCNC: 102 MG/DL (ref 82–115)
HCT VFR BLD AUTO: 36.9 % (ref 42–52)
HGB BLD-MCNC: 12.7 G/DL (ref 14–18)
MCH RBC QN AUTO: 32.8 PG (ref 27–31)
MCHC RBC AUTO-ENTMCNC: 34.4 G/DL (ref 33–36)
MCV RBC AUTO: 95.3 FL (ref 80–94)
NRBC BLD AUTO-RTO: 0 %
PLATELET # BLD AUTO: 188 X10(3)/MCL (ref 130–400)
PMV BLD AUTO: 8.7 FL (ref 7.4–10.4)
POTASSIUM SERPL-SCNC: 4 MMOL/L (ref 3.5–5.1)
RBC # BLD AUTO: 3.87 X10(6)/MCL (ref 4.7–6.1)
SODIUM SERPL-SCNC: 137 MMOL/L (ref 136–145)
WBC # BLD AUTO: 6.77 X10(3)/MCL (ref 4.5–11.5)

## 2025-03-12 PROCEDURE — 36415 COLL VENOUS BLD VENIPUNCTURE: CPT | Performed by: INTERNAL MEDICINE

## 2025-03-12 PROCEDURE — 11000001 HC ACUTE MED/SURG PRIVATE ROOM

## 2025-03-12 PROCEDURE — 63600175 PHARM REV CODE 636 W HCPCS: Performed by: INTERNAL MEDICINE

## 2025-03-12 PROCEDURE — 25000003 PHARM REV CODE 250: Performed by: INTERNAL MEDICINE

## 2025-03-12 PROCEDURE — 97161 PT EVAL LOW COMPLEX 20 MIN: CPT

## 2025-03-12 PROCEDURE — 80048 BASIC METABOLIC PNL TOTAL CA: CPT | Performed by: INTERNAL MEDICINE

## 2025-03-12 PROCEDURE — 85027 COMPLETE CBC AUTOMATED: CPT | Performed by: INTERNAL MEDICINE

## 2025-03-12 PROCEDURE — 63600175 PHARM REV CODE 636 W HCPCS: Performed by: NURSE PRACTITIONER

## 2025-03-12 PROCEDURE — 25000003 PHARM REV CODE 250: Performed by: PODIATRIST

## 2025-03-12 PROCEDURE — 25000003 PHARM REV CODE 250: Performed by: NURSE PRACTITIONER

## 2025-03-12 RX ORDER — LABETALOL HYDROCHLORIDE 5 MG/ML
10 INJECTION, SOLUTION INTRAVENOUS EVERY 6 HOURS PRN
Status: DISCONTINUED | OUTPATIENT
Start: 2025-03-12 | End: 2025-03-14 | Stop reason: HOSPADM

## 2025-03-12 RX ADMIN — PIPERACILLIN SODIUM AND TAZOBACTAM SODIUM 4.5 G: 4; .5 INJECTION, POWDER, LYOPHILIZED, FOR SOLUTION INTRAVENOUS at 09:03

## 2025-03-12 RX ADMIN — HYDROCODONE BITARTRATE AND ACETAMINOPHEN 1 TABLET: 7.5; 325 TABLET ORAL at 01:03

## 2025-03-12 RX ADMIN — HYDROCODONE BITARTRATE AND ACETAMINOPHEN 1 TABLET: 7.5; 325 TABLET ORAL at 07:03

## 2025-03-12 RX ADMIN — SODIUM CHLORIDE, POTASSIUM CHLORIDE, SODIUM LACTATE AND CALCIUM CHLORIDE: 600; 310; 30; 20 INJECTION, SOLUTION INTRAVENOUS at 05:03

## 2025-03-12 RX ADMIN — Medication 200 ML: at 08:03

## 2025-03-12 RX ADMIN — AMLODIPINE BESYLATE 10 MG: 5 TABLET ORAL at 09:03

## 2025-03-12 RX ADMIN — Medication 200 ML: at 09:03

## 2025-03-12 RX ADMIN — ENOXAPARIN SODIUM 40 MG: 40 INJECTION SUBCUTANEOUS at 04:03

## 2025-03-12 RX ADMIN — SENNOSIDES AND DOCUSATE SODIUM 1 TABLET: 50; 8.6 TABLET ORAL at 09:03

## 2025-03-12 RX ADMIN — HYDROCODONE BITARTRATE AND ACETAMINOPHEN 1 TABLET: 7.5; 325 TABLET ORAL at 06:03

## 2025-03-12 RX ADMIN — VANCOMYCIN HYDROCHLORIDE 1000 MG: 1 INJECTION, POWDER, LYOPHILIZED, FOR SOLUTION INTRAVENOUS at 09:03

## 2025-03-12 RX ADMIN — PIPERACILLIN SODIUM AND TAZOBACTAM SODIUM 4.5 G: 4; .5 INJECTION, POWDER, LYOPHILIZED, FOR SOLUTION INTRAVENOUS at 02:03

## 2025-03-12 RX ADMIN — Medication 200 ML: at 02:03
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
## 2025-03-12 NOTE — PLAN OF CARE
Problem: Physical Therapy  Goal: Physical Therapy Goal  Description: Goals to be met by: 25     Patient will increase functional independence with mobility by performin. Stand to Sit transfer with Modified [place] NWB LLE  2. Bed to chair transfer with Modified [place] using Rolling Walker NWB LLE  3. Gait  x 150 feet with Modified [place] using Rolling Walker. NWB LLE  4. Ascend/descend 7 stair with no Handrails Modified [place] Laird using LRAD NWB LLE    Outcome: Progressing

## 2025-03-12 NOTE — PROGRESS NOTES
[facility]                       [address] [place] [id number]    PATIENT NAME:       JULISSA SCOTT   YOB: 1965  CSN:                [id number]   MRN:                [id number]  ADMIT DATE:         03/09/2025 08:01:00  PHYSICIAN:          Get Enriquez DPM                            PROGRESS NOTE    DATE:  03/12/2025 00:00:00    SUBJECTIVE:  The patient is seen today.  He is one day status post excisional   debridement of his left heel wound.  He is doing well overall.  He did work with   Physical Therapy, to be nonweightbearing on that foot.    PHYSICAL EXAMINATION:  VITAL SIGNS:  Stable, and he is afebrile.  SKIN:  Wound site is clean and viable.  There is no paco necrosis.  Pinpoint   bleeding, but no purulence.  No fluctuance or crepitation.  Edema is improved to   the leg as are previous inflammatory changes.    LABORATORY DATA:  Reviewed.  White cell count 6.7, H and H 12.7 and 36.9.  BUN   and creatinine 30 and 2.12.  Intraoperative cultures currently negative at 24   hours.    ASSESSMENT:  Status post excisional debridement of left heel wound.    PLAN:  Dressings were placed.  Strict nonweightbearing on the foot.  I did   reapply dressing using Morin dressing up to the knee to provide some   compression.  Continue with antibiotics.  Wait for cultures.        ______________________________  Get Enriquez DPM    GAS/AQS  DD:  03/12/2025  Time:  03:44PM  DT:  03/12/2025  Time:  05:34PM  Job #:  [id number]      PROGRESS NOTE

## 2025-03-12 NOTE — ANESTHESIA POSTPROCEDURE EVALUATION
Anesthesia Post Evaluation    Patient: Kamaljit Frias    Procedure(s) Performed: Procedure(s) (LRB):  DEBRIDEMENT, FOOT (Left)    Final Anesthesia Type: general      Patient location during evaluation: PACU  Patient participation: Yes- Able to Participate  Level of consciousness: awake and alert and oriented  Post-procedure vital signs: reviewed and stable  Pain management: adequate  Airway patency: patent  BECK mitigation strategies: Postoperative administration of CPAP, nasopharyngeal airway, or oral appliance in the postanesthesia care unit (PACU)  PONV status at discharge: No PONV  Anesthetic complications: no      Cardiovascular status: hemodynamically stable  Respiratory status: unassisted, spontaneous ventilation and room air  Hydration status: euvolemic  Follow-up not needed.              Vitals Value Taken Time   /87 03/12/25 03:41   Temp 36.6 °C (97.8 °F) 03/12/25 03:41   Pulse 72 03/12/25 03:41   Resp 16 03/12/25 03:41   SpO2 94 % 03/12/25 03:41         No case tracking events are documented in the log.      Pain/Olive Score: Pain Rating Prior to Med Admin: 6 (3/11/2025 11:39 PM)  Pain Rating Post Med Admin: 0 (3/12/2025 12:39 AM)
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
## 2025-03-13 LAB
ALBUMIN SERPL-MCNC: 3.1 G/DL (ref 3.4–4.8)
ALBUMIN/GLOB SERPL: 0.9 RATIO (ref 1.1–2)
ALP SERPL-CCNC: 49 UNIT/L (ref 40–150)
ALT SERPL-CCNC: 15 UNIT/L (ref 0–55)
ANION GAP SERPL CALC-SCNC: 11 MEQ/L
AST SERPL-CCNC: 19 UNIT/L (ref 5–34)
BILIRUB SERPL-MCNC: 0.7 MG/DL
BUN SERPL-MCNC: 28.8 MG/DL (ref 8.4–25.7)
CALCIUM SERPL-MCNC: 9.4 MG/DL (ref 8.8–10)
CHLORIDE SERPL-SCNC: 101 MMOL/L (ref 98–107)
CO2 SERPL-SCNC: 25 MMOL/L (ref 23–31)
CREAT SERPL-MCNC: 2.11 MG/DL (ref 0.72–1.25)
CREAT/UREA NIT SERPL: 14
ERYTHROCYTE [DISTWIDTH] IN BLOOD BY AUTOMATED COUNT: 12.8 % (ref 11.5–17)
GFR SERPLBLD CREATININE-BSD FMLA CKD-EPI: 35 ML/MIN/1.73/M2
GLOBULIN SER-MCNC: 3.4 GM/DL (ref 2.4–3.5)
GLUCOSE SERPL-MCNC: 128 MG/DL (ref 82–115)
HCT VFR BLD AUTO: 37 % (ref 42–52)
HGB BLD-MCNC: 13 G/DL (ref 14–18)
MCH RBC QN AUTO: 33.2 PG (ref 27–31)
MCHC RBC AUTO-ENTMCNC: 35.1 G/DL (ref 33–36)
MCV RBC AUTO: 94.6 FL (ref 80–94)
NRBC BLD AUTO-RTO: 0 %
PLATELET # BLD AUTO: 197 X10(3)/MCL (ref 130–400)
PMV BLD AUTO: 8.9 FL (ref 7.4–10.4)
POTASSIUM SERPL-SCNC: 4.1 MMOL/L (ref 3.5–5.1)
PROT SERPL-MCNC: 6.5 GM/DL (ref 5.8–7.6)
RBC # BLD AUTO: 3.91 X10(6)/MCL (ref 4.7–6.1)
SODIUM SERPL-SCNC: 137 MMOL/L (ref 136–145)
WBC # BLD AUTO: 6.25 X10(3)/MCL (ref 4.5–11.5)

## 2025-03-13 PROCEDURE — 25000003 PHARM REV CODE 250: Performed by: NURSE PRACTITIONER

## 2025-03-13 PROCEDURE — 63600175 PHARM REV CODE 636 W HCPCS: Performed by: INTERNAL MEDICINE

## 2025-03-13 PROCEDURE — 80053 COMPREHEN METABOLIC PANEL: CPT | Performed by: INTERNAL MEDICINE

## 2025-03-13 PROCEDURE — 36415 COLL VENOUS BLD VENIPUNCTURE: CPT | Performed by: INTERNAL MEDICINE

## 2025-03-13 PROCEDURE — 11000001 HC ACUTE MED/SURG PRIVATE ROOM

## 2025-03-13 PROCEDURE — 97116 GAIT TRAINING THERAPY: CPT | Mod: CQ

## 2025-03-13 PROCEDURE — 25000003 PHARM REV CODE 250: Performed by: PODIATRIST

## 2025-03-13 PROCEDURE — 63600175 PHARM REV CODE 636 W HCPCS: Performed by: NURSE PRACTITIONER

## 2025-03-13 PROCEDURE — 25000003 PHARM REV CODE 250: Performed by: INTERNAL MEDICINE

## 2025-03-13 PROCEDURE — 85027 COMPLETE CBC AUTOMATED: CPT | Performed by: INTERNAL MEDICINE

## 2025-03-13 RX ADMIN — HYDROCODONE BITARTRATE AND ACETAMINOPHEN 1 TABLET: 7.5; 325 TABLET ORAL at 12:03

## 2025-03-13 RX ADMIN — PIPERACILLIN SODIUM AND TAZOBACTAM SODIUM 4.5 G: 4; .5 INJECTION, POWDER, LYOPHILIZED, FOR SOLUTION INTRAVENOUS at 12:03

## 2025-03-13 RX ADMIN — ENOXAPARIN SODIUM 40 MG: 40 INJECTION SUBCUTANEOUS at 05:03

## 2025-03-13 RX ADMIN — PIPERACILLIN SODIUM AND TAZOBACTAM SODIUM 4.5 G: 4; .5 INJECTION, POWDER, LYOPHILIZED, FOR SOLUTION INTRAVENOUS at 08:03

## 2025-03-13 RX ADMIN — Medication 200 ML: at 09:03

## 2025-03-13 RX ADMIN — HYDROCODONE BITARTRATE AND ACETAMINOPHEN 1 TABLET: 7.5; 325 TABLET ORAL at 06:03

## 2025-03-13 RX ADMIN — AMLODIPINE BESYLATE 10 MG: 5 TABLET ORAL at 09:03

## 2025-03-13 RX ADMIN — Medication 200 ML: at 03:03

## 2025-03-13 RX ADMIN — Medication 200 ML: at 08:03

## 2025-03-13 RX ADMIN — HYDROCODONE BITARTRATE AND ACETAMINOPHEN 1 TABLET: 7.5; 325 TABLET ORAL at 07:03

## 2025-03-13 RX ADMIN — PIPERACILLIN SODIUM AND TAZOBACTAM SODIUM 4.5 G: 4; .5 INJECTION, POWDER, LYOPHILIZED, FOR SOLUTION INTRAVENOUS at 04:03

## 2025-03-13 RX ADMIN — SENNOSIDES AND DOCUSATE SODIUM 1 TABLET: 50; 8.6 TABLET ORAL at 09:03

## 2025-03-13 NOTE — PLAN OF CARE
Problem: Adult Inpatient Plan of Care  Goal: Plan of Care Review  Outcome: Progressing  Goal: Absence of Hospital-Acquired Illness or Injury  Outcome: Progressing  Goal: Optimal Comfort and Wellbeing  Outcome: Progressing  Goal: Readiness for Transition of Care  Outcome: Progressing     Problem: Wound  Goal: Optimal Coping  Outcome: Progressing  Goal: Optimal Functional Ability  Outcome: Progressing  Goal: Absence of Infection Signs and Symptoms  Outcome: Progressing  Goal: Improved Oral Intake  Outcome: Progressing  Goal: Optimal Pain Control and Function  Outcome: Progressing  Goal: Skin Health and Integrity  Outcome: Progressing  Goal: Optimal Wound Healing  Outcome: Progressing     Problem: Acute Kidney Injury/Impairment  Goal: Fluid and Electrolyte Balance  Outcome: Progressing  Goal: Improved Oral Intake  Outcome: Progressing  Goal: Effective Renal Function  Outcome: Progressing     Problem: Fall Injury Risk  Goal: Absence of Fall and Fall-Related Injury  Outcome: Progressing
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
## 2025-03-14 VITALS
DIASTOLIC BLOOD PRESSURE: 91 MMHG | OXYGEN SATURATION: 94 % | SYSTOLIC BLOOD PRESSURE: 143 MMHG | TEMPERATURE: 98 F | WEIGHT: 244.94 LBS | BODY MASS INDEX: 32.46 KG/M2 | HEART RATE: 92 BPM | HEIGHT: 73 IN | RESPIRATION RATE: 18 BRPM

## 2025-03-14 LAB
ANION GAP SERPL CALC-SCNC: 10 MEQ/L
BACTERIA BLD CULT: NORMAL
BACTERIA BLD CULT: NORMAL
BACTERIA SPEC ANAEROBE CULT: NORMAL
BACTERIA WND CULT: NORMAL
BUN SERPL-MCNC: 29.4 MG/DL (ref 8.4–25.7)
CALCIUM SERPL-MCNC: 9.2 MG/DL (ref 8.8–10)
CHLORIDE SERPL-SCNC: 102 MMOL/L (ref 98–107)
CO2 SERPL-SCNC: 25 MMOL/L (ref 23–31)
CREAT SERPL-MCNC: 1.96 MG/DL (ref 0.72–1.25)
CREAT/UREA NIT SERPL: 15
GFR SERPLBLD CREATININE-BSD FMLA CKD-EPI: 38 ML/MIN/1.73/M2
GLUCOSE SERPL-MCNC: 122 MG/DL (ref 82–115)
POTASSIUM SERPL-SCNC: 4.2 MMOL/L (ref 3.5–5.1)
SODIUM SERPL-SCNC: 137 MMOL/L (ref 136–145)

## 2025-03-14 PROCEDURE — 63600175 PHARM REV CODE 636 W HCPCS: Performed by: NURSE PRACTITIONER

## 2025-03-14 PROCEDURE — 25000003 PHARM REV CODE 250: Performed by: INTERNAL MEDICINE

## 2025-03-14 PROCEDURE — 80048 BASIC METABOLIC PNL TOTAL CA: CPT | Performed by: STUDENT IN AN ORGANIZED HEALTH CARE EDUCATION/TRAINING PROGRAM

## 2025-03-14 PROCEDURE — 36415 COLL VENOUS BLD VENIPUNCTURE: CPT | Performed by: STUDENT IN AN ORGANIZED HEALTH CARE EDUCATION/TRAINING PROGRAM

## 2025-03-14 PROCEDURE — 25000003 PHARM REV CODE 250: Performed by: NURSE PRACTITIONER

## 2025-03-14 PROCEDURE — 63600175 PHARM REV CODE 636 W HCPCS: Performed by: INTERNAL MEDICINE

## 2025-03-14 PROCEDURE — 25000003 PHARM REV CODE 250: Performed by: PODIATRIST

## 2025-03-14 RX ORDER — AMLODIPINE BESYLATE 10 MG/1
10 TABLET ORAL DAILY
Qty: 90 TABLET | Refills: 3 | Status: SHIPPED | OUTPATIENT
Start: 2025-03-15 | End: 2025-03-27 | Stop reason: SDUPTHER

## 2025-03-14 RX ORDER — AMOXICILLIN 250 MG
1 CAPSULE ORAL DAILY
Qty: 90 TABLET | Refills: 1 | Status: SHIPPED | OUTPATIENT
Start: 2025-03-15

## 2025-03-14 RX ORDER — AMOXICILLIN AND CLAVULANATE POTASSIUM 500; 125 MG/1; MG/1
1 TABLET, FILM COATED ORAL 2 TIMES DAILY
Qty: 10 TABLET | Refills: 0 | Status: SHIPPED | OUTPATIENT
Start: 2025-03-14 | End: 2025-03-19

## 2025-03-14 RX ORDER — HYDROCODONE BITARTRATE AND ACETAMINOPHEN 5; 325 MG/1; MG/1
1 TABLET ORAL EVERY 6 HOURS PRN
Qty: 20 TABLET | Refills: 0 | Status: SHIPPED | OUTPATIENT
Start: 2025-03-14 | End: 2025-03-18 | Stop reason: SDUPTHER

## 2025-03-14 RX ADMIN — SENNOSIDES AND DOCUSATE SODIUM 1 TABLET: 50; 8.6 TABLET ORAL at 08:03

## 2025-03-14 RX ADMIN — HYDROCODONE BITARTRATE AND ACETAMINOPHEN 1 TABLET: 7.5; 325 TABLET ORAL at 12:03

## 2025-03-14 RX ADMIN — AMLODIPINE BESYLATE 10 MG: 5 TABLET ORAL at 08:03

## 2025-03-14 RX ADMIN — SODIUM CHLORIDE, POTASSIUM CHLORIDE, SODIUM LACTATE AND CALCIUM CHLORIDE: 600; 310; 30; 20 INJECTION, SOLUTION INTRAVENOUS at 12:03

## 2025-03-14 RX ADMIN — PIPERACILLIN SODIUM AND TAZOBACTAM SODIUM 4.5 G: 4; .5 INJECTION, POWDER, LYOPHILIZED, FOR SOLUTION INTRAVENOUS at 04:03

## 2025-03-14 RX ADMIN — HYDROCODONE BITARTRATE AND ACETAMINOPHEN 1 TABLET: 7.5; 325 TABLET ORAL at 06:03

## 2025-03-14 NOTE — PLAN OF CARE
Discussed wound care clinic with patient he is agreeable. Discussed home health he does not wish to have he cannot afford copay
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
## 2025-03-14 NOTE — PLAN OF CARE
03/14/25 1132   Final Note   Assessment Type Final Discharge Note   Anticipated Discharge Disposition Home   Post-Acute Status   Coverage private insurance   Discharge Delays None known at this time

## 2025-03-14 NOTE — DISCHARGE INSTRUCTIONS
Clean left heel wound with normal saline, cover with adaptic, ABD, wrap foot ankle and lower leg with kerlix and ACE daily
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
## 2025-03-18 ENCOUNTER — OFFICE VISIT (OUTPATIENT)
Dept: PRIMARY CARE CLINIC | Facility: CLINIC | Age: 60
End: 2025-03-18
Payer: COMMERCIAL

## 2025-03-18 VITALS
SYSTOLIC BLOOD PRESSURE: 136 MMHG | WEIGHT: 249 LBS | OXYGEN SATURATION: 96 % | BODY MASS INDEX: 33 KG/M2 | HEIGHT: 73 IN | DIASTOLIC BLOOD PRESSURE: 83 MMHG | TEMPERATURE: 98 F | HEART RATE: 109 BPM

## 2025-03-18 DIAGNOSIS — L08.9 LEFT FOOT INFECTION: ICD-10-CM

## 2025-03-18 DIAGNOSIS — I10 ESSENTIAL (PRIMARY) HYPERTENSION: ICD-10-CM

## 2025-03-18 DIAGNOSIS — L97.521 ULCER OF LEFT FOOT, LIMITED TO BREAKDOWN OF SKIN: ICD-10-CM

## 2025-03-18 DIAGNOSIS — Z09 HOSPITAL DISCHARGE FOLLOW-UP: Primary | ICD-10-CM

## 2025-03-18 DIAGNOSIS — N17.9 AKI (ACUTE KIDNEY INJURY): ICD-10-CM

## 2025-03-18 RX ORDER — HYDROCODONE BITARTRATE AND ACETAMINOPHEN 5; 325 MG/1; MG/1
1 TABLET ORAL EVERY 6 HOURS PRN
Qty: 20 TABLET | Refills: 0 | Status: SHIPPED | OUTPATIENT
Start: 2025-03-18 | End: 2025-03-23

## 2025-03-18 NOTE — PROGRESS NOTES
Hospital Follow Up (Left heel debri)       HPI:    Patient presents for hospital discharge follow-up.    Admit date 03/09/2025.    Discharge date 03/14/2025.  Discharge diagnoses:  Left heel wound with surrounding cellulitis   - hx of left 1st toe amputation  - S/p I&D on 3/11  Elevated CRP   BROOKS - baseline normal in 4/ 2024: 0.99    Patient presents to the emergency department with left foot pain due to wound on the bottom of his foot with drainage for the past few weeks.  Patient was taking Cipro and doxycycline without improvement.  ED Course: Initial ED Vital Signs included /73, , RR 18, SpO2 96% on room air, temperature 98.5° F.  Labs notable for hemoglobin 13.4, hematocrit 39.1, CO2 20, BUN 47.7, creatinine 2.51, glucose 126, calcium 8.7, AST 48, CRP 66.5.  Lactic acid normal.  Blood cultures x2 wound culture pending.  Left foot x-ray demonstrated skin contour abnormality of the heel may represent focal wound; widespread soft tissue swelling without acute osseous abnormality.  He was started on broad-spectrum IV antibiotics with vancomycin and Zosyn.  Admitted to hospital medicine service for further medical management.   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 Denies smoking, occasional alcohol, denies hx of withdrawals   I and D performed on 03/11/2025.  Preliminary wound culture shows rare normal skin missy with no further workup.  Zosyn was deescalated and patient was prescribed Augmentin at time of discharge.  Lisinopril was held at time of discharge.  Patient was continued on amlodipine 10 mg daily for blood pressure control.  BUN/creatinine on 03/14/2025 was 29.4 and 1.96.    Current Outpatient Medications   Medication Instructions    amLODIPine (NORVASC) 10 mg, Oral, Daily     "amoxicillin-clavulanate 500-125mg (AUGMENTIN) 500-125 mg Tab 500 mg, Oral, 2 times daily    HYDROcodone-acetaminophen (NORCO) 5-325 mg per tablet 1 tablet, Oral, Every 6 hours PRN    senna-docusate 8.6-50 mg (PERICOLACE) 8.6-50 mg per tablet 1 tablet, Oral, Daily         ROS:    Patient has been feeling well.  He does report pain to heel.  Patient was discharged on Norco 5 mg.  He needs a refill.  He is awaiting an appointment with Wound Care.      PE:    ..Visit Vitals  /83   Pulse 109   Temp 98 °F (36.7 °C)   Ht 6' 1" (1.854 m)   Wt 112.9 kg (249 lb)   SpO2 96%   BMI 32.85 kg/m²        General: He is well-developed well-nourished white male in no apparent distress.  He is alert and oriented.    Chest: Clear to auscultation bilaterally.    CV: Regular rate and rhythm without murmurs rubs or gallops.        1. Hospital discharge follow-up  Overview:  Patient presents for hospital follow-up.    He is status post left total hip replacement on 02/02/2023.  He has had an uneventful postoperative recovery.  His blood pressure is elevated today; patient advised to monitor pressures at home.    04/18/2024:   See HPI.  Patient is doing much better.  He is staying at his sister's.  His blister is healing.  His cellulitis is much improved; finish course of ciprofloxacin and doxycycline.  Continue nonweightbearing per Dr Enriquez's recommendations.  Patient has follow-up with him in 1 week.    Patient takes Norco as needed for pain; potential risks and side effects discussed with patient.  Patient advised to call with any questions or concerns.      Assessment & Plan:  Patient presents for hospital discharge follow-up.    See HPI HPI for further details.  Patient is status post debridement of left heel ulcer.  Patient also had cellulitis.  Patient is changing dressing daily.  He is awaiting appointment for wound care.  Patient with acute kidney injury; fluids encouraged.  Recheck Chem 7 in one-month.      2. Ulcer of left " foot, limited to breakdown of skin  Overview:  Patient with denuded area to left heel; margins with maceration and adjacent cellulitis.  There is foul-smelling drainage noted.  He has cellulitis extending from foot to distal calf region.  Will send to ER to be admitted.    10/09/2024: Patient presents with ulcerated areas of left heel.  He states area has been draining.  He denies any fever or chills.  Exam reveals dime-sized ulcerated area to plantar aspect of heel.    Patient advised he will need to elevate left lower extremity when he is not active.    Start Omnicef 300 mg twice daily times 14 days.    ER precautions given for worsening symptoms.    Refer to Get Enriquez podiatrist.  He cared for patient's last ulcer.    12/11/2024:  Patient presents for possible work release.  He saw Dr. Enriquez for his foot ulcer.  He sees him again on 12/17/2024.  He states ulcers closed in.  Exam does not reveal any drainage, tenderness or erythema.  However, in general, his heel/plantar surface of foot does not look well.  I advise patient to follow-up with his podiatrist his plan and that he will need to get work clearance from him.  ER precautions given.  I advised patient that it would be very difficult to treat this properly with him living in his car.    Assessment & Plan:  See HPI.    Patient is status post debridement 03/11/2025.  Patient is awaiting appointment from wound care.  Finish course of Augmentin.  Norco 5 mg refilled; benefits, potential risks and side effects discussed with patient.      3. Left foot infection  Overview:  03/18/2025:  See HPI for further details.    Patient is awaiting appointment from wound care.    Patient is finishing course of Augmentin.    Orders:  -     HYDROcodone-acetaminophen (NORCO) 5-325 mg per tablet; Take 1 tablet by mouth every 6 (six) hours as needed for Pain.  Dispense: 20 tablet; Refill: 0    4. BROOKS (acute kidney injury)  Overview:  03/18/2025: Admit BUN and creatinine  was 47.7 and 2.51.  Discharge creatinine was 1.91.  Baseline creatinine 1.0  Continue to drink a lot of water.  Recheck Chem 7 in one-month.    Orders:  -     Basic Metabolic Panel; Future; Expected date: 04/18/2025    5. Essential (primary) hypertension  Overview:  Usually well controlled; elevated today x2.  Probably secondary to pain.  Patient advised to monitor blood pressures and let us know how they are doing.    Patient advised to monitor pressures and let me know how they are doing in 2 weeks.    07/10/2023: His blood pressure is well controlled; continue current medications.  Refills sent.    Limit sodium consumption.    Patient encouraged to increase physical activity, exercise and lose weight.    10/05/2023: His blood pressure is well controlled; continue current medication.    Limit sodium consumption.    Patient encouraged to lose weight.    10/09/2024:  His blood pressure is usually well controlled.  He did not take his blood pressure medication today; he is going to pharmacy to fill his prescription.  Limit sodium consumption.    Patient encouraged to lose weight.    Assessment & Plan:  His blood pressure is controlled; continue amlodipine.                ..No follow-ups on file.       No future appointments.

## 2025-03-18 NOTE — ASSESSMENT & PLAN NOTE
Patient presents for hospital discharge follow-up.    See [facility] HPI for further details.  Patient is status post debridement of left heel ulcer.  Patient also had cellulitis.  Patient is changing dressing daily.  He is awaiting appointment for wound care.  Patient with acute kidney injury; fluids encouraged.  Recheck Chem 7 in one-month.

## 2025-03-18 NOTE — ASSESSMENT & PLAN NOTE
See HPI.    Patient is status post debridement 03/11/2025.  Patient is awaiting appointment from wound care.  Finish course of Augmentin.  Norco 5 mg refilled; benefits, potential risks and side effects discussed with patient.

## 2025-03-24 ENCOUNTER — TELEPHONE (OUTPATIENT)
Dept: PRIMARY CARE CLINIC | Facility: CLINIC | Age: 60
End: 2025-03-24
Payer: COMMERCIAL

## 2025-03-24 DIAGNOSIS — L08.9 LEFT FOOT INFECTION: ICD-10-CM

## 2025-03-24 RX ORDER — HYDROCODONE BITARTRATE AND ACETAMINOPHEN 5; 325 MG/1; MG/1
1 TABLET ORAL EVERY 6 HOURS PRN
Qty: 20 TABLET | Refills: 0 | Status: SHIPPED | OUTPATIENT
Start: 2025-03-24 | End: 2025-03-29

## 2025-03-24 NOTE — TELEPHONE ENCOUNTER
----- Message from Jian Walter MD sent at 3/24/2025  3:43 PM CDT -----  Refill for Norco sent.  Patient should contact Dr. Enriquez regarding antibiotics.  From his exam last week, I do not believe he needs additional antibiotics.  ----- Message -----  From: Felicity Finley LPN  Sent: 3/24/2025   3:30 PM CDT  To: Jian Walter MD      ----- Message -----  From: Yakelin Callejas  Sent: 3/24/2025   1:46 PM CDT  To: Jose Angel WAGGON[facility] Staff    .Who Called: Kamaljit RodriguezPreferlinda Method of Contact: Phone CallPatient's Preferred Phone Number on File: [phone] Best Call Back Number, if different:Additional Information: pt calling for refills on pain meds and abx do he need more refills he ask ?

## 2025-03-25 LAB — FUNGUS SPEC CULT: NORMAL

## 2025-03-27 ENCOUNTER — TELEPHONE (OUTPATIENT)
Dept: PRIMARY CARE CLINIC | Facility: CLINIC | Age: 60
End: 2025-03-27
Payer: COMMERCIAL

## 2025-03-27 DIAGNOSIS — I10 ESSENTIAL (PRIMARY) HYPERTENSION: Primary | ICD-10-CM

## 2025-03-27 RX ORDER — AMLODIPINE BESYLATE 10 MG/1
10 TABLET ORAL DAILY
Qty: 90 TABLET | Refills: 3 | Status: SHIPPED | OUTPATIENT
Start: 2025-03-27 | End: 2026-03-27

## 2025-03-27 NOTE — TELEPHONE ENCOUNTER
Copied from CRM #[id number]. Topic: General Inquiry - Patient Advice  >> Mar 27, 2025 11:06 AM Krupa wrote:  Who Called: Kamaljit Frias    Caller is requesting assistance/information from provider's office.    Symptoms (please be specific): n/a   How long has patient had these symptoms:  n/a  List of preferred pharmacies on file (remove unneeded): [unfilled]  If different, enter pharmacy into here including location and phone number: n/a      Preferred Method of Contact: Phone Call  Patient's Preferred Phone Number on File: [phone] (call after 1pm)  Best Call Back Number, if different:  Additional Information: medical advice,  pt called to discuss medication for BP, please advise, thanks

## 2025-04-02 ENCOUNTER — HOSPITAL ENCOUNTER (OUTPATIENT)
Dept: WOUND CARE | Facility: HOSPITAL | Age: 60
Discharge: HOME | End: 2025-04-02
Attending: FAMILY MEDICINE
Payer: COMMERCIAL

## 2025-04-02 VITALS
TEMPERATURE: 98 F | SYSTOLIC BLOOD PRESSURE: 142 MMHG | OXYGEN SATURATION: 95 % | HEART RATE: 89 BPM | RESPIRATION RATE: 20 BRPM | DIASTOLIC BLOOD PRESSURE: 78 MMHG

## 2025-04-02 DIAGNOSIS — S90.822A BLISTER OF LEFT HEEL WITH INFECTION, INITIAL ENCOUNTER: ICD-10-CM

## 2025-04-02 DIAGNOSIS — L08.9 BLISTER OF LEFT HEEL WITH INFECTION, INITIAL ENCOUNTER: ICD-10-CM

## 2025-04-02 DIAGNOSIS — L08.9 LEFT FOOT INFECTION: ICD-10-CM

## 2025-04-02 PROCEDURE — 99211 OFF/OP EST MAY X REQ PHY/QHP: CPT

## 2025-04-02 PROCEDURE — 27000999 HC MEDICAL RECORD PHOTO DOCUMENTATION

## 2025-04-02 RX ORDER — DOXYCYCLINE 100 MG/1
100 CAPSULE ORAL 2 TIMES DAILY
Qty: 14 CAPSULE | Refills: 0 | Status: SHIPPED | OUTPATIENT
Start: 2025-04-02 | End: 2025-04-09

## 2025-04-02 NOTE — PROGRESS NOTES
CHIEF COMPLAINT:  Chief Complaint   Patient presents with    Wound Care     Left heel     HISTORY OF  PRESENT ILLNESS:  59 y.o. White male being seen today for left heel wound.  Patient was admitted on 03/09/25 and was discharged 5 days later.  He had I and D done on 03/11 by podiatrist at [facility].  He was on broad-spectrum antibiotics and was given Augmentin at discharge.  His only other medical history is high blood pressure.  Of note, patient is homeless and has limited resources.  He has been cleaning the area with water and covering.    REVIEW OF SYSTEMS:  Review of Systems   Constitutional:  Negative for chills and fever.   Respiratory:  Negative for cough.    Gastrointestinal:  Negative for nausea.   Musculoskeletal:         As stated in HPI   Skin:         As stated in HPI   Psychiatric/Behavioral: Negative.         Past Medical History:   Diagnosis Date    Essential (primary) hypertension     Neuropathy of lower extremity       Past Surgical History:   Procedure Laterality Date    COLOSTOMY  1981    DEBRIDEMENT OF FOOT Left 3/11/2025    Procedure: DEBRIDEMENT, FOOT;  Surgeon: Get Enriquez DPM;  [facility]: [facility];  Service: Podiatry;  Laterality: Left;    Detachment at Left 1st Toe, Complete, Open Approach Left 03/13/2017    herniated disc repaired   2015    left femur break   2008    left hip replacement  02/02/2023    sciatic nerve repair  Left 1980      Social History     Socioeconomic History    Marital status:     Number of children: 0   Occupational History    Occupation: oil    Tobacco Use    Smoking status: Former    Smokeless tobacco: Never    Tobacco comments:     Age started: 18; Age stopped: 54   Substance and Sexual Activity    Alcohol use: Yes     Comment: every 2-3 days    Drug use: Never     Social Drivers of Health     Financial Resource Strain: High Risk (3/10/2025)    Overall Financial Resource Strain (CARDIA)     Difficulty of Paying Living Expenses:  Very hard   Food Insecurity: Food Insecurity Present (3/10/2025)    Hunger Vital Sign     Worried About Running Out of Food in the Last Year: Often true     Ran Out of Food in the Last Year: Often true   Transportation Needs: No Transportation Needs (4/12/2024)    PRAPARE - Transportation     Lack of Transportation (Medical): No     Lack of Transportation (Non-Medical): No   Stress: Stress Concern Present (3/10/2025)    [employer] - Occupational Stress Questionnaire     Feeling of Stress : Very much   Housing Stability: High Risk (3/10/2025)    Housing Stability Vital Sign     Unable to Pay for Housing in the Last Year: Yes     Homeless in the Last Year: Yes       Review of Most Recent Wound Care-Related Labs:  Lab Results   Component Value Date/Time    WBC 6.25 03/13/2025 04:13 AM    RBC 3.91 (L) 03/13/2025 04:13 AM    HGB 13.0 (L) 03/13/2025 04:13 AM    HGB 12.9 (L) 10/17/2023 10:44 AM    HCT 37.0 (L) 03/13/2025 04:13 AM    HCT 38.4 (L) 10/17/2023 10:44 AM    MCV 94.6 (H) 03/13/2025 04:13 AM    MCH 33.2 (H) 03/13/2025 04:13 AM    CREATININE 1.96 (H) 03/14/2025 04:11 AM    CREATININE 1.31 (H) 10/17/2023 10:44 AM    HGBA1C 6.6 03/10/2025 03:44 AM    HGBA1C 5.8 09/19/2023 11:12 AM    CRP 66.50 (H) 03/09/2025 08:24 AM        Wound-Related Imaging:                PHYSICAL EXAMINATION:  Blood pressure (!) 142/78, pulse 89, temperature 98.2 °F (36.8 °C), resp. rate 20, SpO2 95%.  General:  VSS, afebrile. No acute distress.   Respiratory: Non labored.  No coughing.  Cardiovascular:  + peripheral edema of left LE.  DP pulses present by doppler  Musculoskeletal:  No joint deformities  Neurologic:  A&O X 3.    Psychiatric:  Calm, cooperative.  Mood and effect normal.  Responses appropriate.   Integumentary:  Left heel wound with 60% granulation tissue, small area of slough       Altered Skin Integrity 04/11/24 0130 Left plantar Heel Ulceration (Active)   04/11/24 0130   Altered Skin Integrity  Present on Admission - Did Patient arrive to the hospital with altered skin?: yes   Side: Left   Orientation: plantar   Location: Heel   Wound Number:    Is this injury device related?:    Primary Wound Type: Ulceration   Description of Altered Skin Integrity:    Ankle-Brachial Index:    Pulses:    Removal Indication and Assessment:    Wound Outcome:    (Retired) Wound Length (cm):    (Retired) Wound Width (cm):    (Retired) Depth (cm):    Wound Description (Comments):    Removal Indications:    Wound Image   04/02/25 1117   Dressing Appearance Moist drainage 04/02/25 1117   Drainage Amount Moderate 04/02/25 1117   Drainage Characteristics/Odor Serosanguineous;Malodorous 04/02/25 1117   Appearance Pink;Red;Eschar 04/02/25 1117   Wound Length (cm) 7 cm 04/02/25 1117   Wound Width (cm) 6.5 cm 04/02/25 1117   Wound Depth (cm) 0.5 cm 04/02/25 1117   Wound Volume (cm^3) 11.912 cm^3 04/02/25 1117   Wound Surface Area (cm^2) 35.74 cm^2 04/02/25 1117     ASSESSMENT/PLAN:    Patient Active Problem List    Diagnosis Date Noted    Left foot infection 03/10/2025    BROOKS (acute kidney injury) 03/10/2025    Blister of left heel with infection 04/15/2024    Ulcer of left foot 04/10/2024    Cellulitis of left lower extremity 04/10/2024    Primary osteoarthritis of right hip 10/03/2023    Edema of left lower extremity 04/10/2023    Hypercalcemia 04/10/2023    History of total hip replacement, left 02/14/2023    Hospital discharge follow-up 02/12/2023    Primary osteoarthritis of both hips 01/09/2023    Hyperglycemia 01/09/2023    Pre-op examination 01/08/2023    Immunization due 11/03/2022    Neuropathy of lower extremity     Chronic midline low back pain without sciatica 07/15/2022    Essential (primary) hypertension      Left heel wound-clean with Dakin's, apply silver alginate, Drawtex, then Kerlix.  He needs to change bandage daily.  Tubigrip given to help with swelling, ED precautions given to discontinue Tubigrip if any  numbness or tingling in the left toes, doxycycline x7 days, Elevate leg to help with swelling, Darco shoe given for offloading.  Return to clinic next week

## 2025-04-02 NOTE — PATIENT INSTRUCTIONS
Pt seen today by: Oren Hill DO    Home health and self care DRESSING INSTRUCTIONS:        Wound location: Left foot    Dressings to be changed daily and as needed if soiled or not intact.      Cleanse wound with half strength Dakins wound cleanser  Apply silver alginate to the wound bed  Cover with drawtex edema dressing and secure with kerlix and tape  Wear darco shoe at all times    Wound location: Right foot    Dressings to be changed daily and as needed if soiled or not intact.      Cleanse wound with half strength Dakins wound cleanser  And keep dry    Compression with: tubigrip size G to bilateral legs    Return visit: 1 week    FOR ANY WORSENING CONDITION PRIOR TO NEXT APPOINTMENT REPORT TO THE NEAREST EMERGENCY ROOM    Nutrition:  The current daily value (%DV) for protein is 50 grams per day and is meant as a general goal for most people. Further increasing your dietary protein intake is very important for wound healing. Typically one needs over 100g of protein per day to help with wound healing needs.  If you are a dialysis patient or have problems with your kidneys, talk to your Nephrologist about how much protein you can take in with your condition.  Examples of high protein items that can be added to your diet include: eggs, chicken, red meats, almonds, cottage cheese, Greek yogurt, beans, and peanut butter.  Fortified protein bars, shakes and drinks can add 15-30 additional grams of protein per serving.  Also add:   1 daily general multivitamin   Vitamin C : 500mg twice daily   Zinc 220 mg daily  Vit D : once daily    Offloading   Offload your wound. This means to reduce pressure on and around the wound that reduces blood flow to the wound and prevents healing. Your wound care team will discuss specific ways for you to offload your specific wound. Common offloading strategies include:    Turn or reposition every 2 hours or sooner  Use pillows, wedges, ROHO wheelchair cushions or other special  devices like boots and shoes to lift the wound off of hard surfaces  Alternating Low Air-loss (ALAL) mattress may be ordered  Padded dressings can reduce wound pressure          Compression: You may be using a compressive type of dressings to control edema: If so, keep your compression wrap or tubigrip in place. Do not get them wet, they should feel snug. If they feel tight, or cause pain in any way,  elevate your legs above your heart for 15 minutes. If the wraps still cause pain or you can not tolerate compression,  please remove and notify the clinic or your home health.         Call our [facility] wound clinic for questions/concerns a [phone] .

## 2025-04-11 ENCOUNTER — HOSPITAL ENCOUNTER (OUTPATIENT)
Dept: WOUND CARE | Facility: HOSPITAL | Age: 60
Discharge: HOME | End: 2025-04-11
Attending: FAMILY MEDICINE
Payer: COMMERCIAL

## 2025-04-11 VITALS
HEART RATE: 92 BPM | SYSTOLIC BLOOD PRESSURE: 149 MMHG | DIASTOLIC BLOOD PRESSURE: 90 MMHG | OXYGEN SATURATION: 98 % | RESPIRATION RATE: 20 BRPM | TEMPERATURE: 98 F

## 2025-04-11 DIAGNOSIS — S91.302D OPEN WOUND OF LEFT HEEL, SUBSEQUENT ENCOUNTER: Primary | ICD-10-CM

## 2025-04-11 PROCEDURE — 27000999 HC MEDICAL RECORD PHOTO DOCUMENTATION

## 2025-04-11 PROCEDURE — 97597 DBRDMT OPN WND 1ST 20 CM/<: CPT

## 2025-04-11 PROCEDURE — 99211 OFF/OP EST MAY X REQ PHY/QHP: CPT

## 2025-04-11 RX ORDER — DOXYCYCLINE HYCLATE 100 MG/1
100 TABLET, DELAYED RELEASE ORAL 2 TIMES DAILY
COMMUNITY

## 2025-04-11 NOTE — PROGRESS NOTES
CHIEF COMPLAINT:  Chief Complaint   Patient presents with    Wound Care     Left foot wound     HISTORY OF  PRESENT ILLNESS:  59 y.o. White male being seen today for left heel wound.  Patient was admitted on 03/09/25 and was discharged 5 days later.  He had I and D done on 03/11 by podiatrist at [facility].  He was on broad-spectrum antibiotics and was given Augmentin at discharge.  His only other medical history is high blood pressure.  Of note, patient is homeless and has limited resources.  He has been cleaning the area with water and covering.    Today's information:  Patient here for follow-up left heel wound.  He has been applying silver alginate, covering with Drawtex and secondary dressing.  He reports staying with his sister for most of the last 2 weeks and his lower extremity swelling had resolved.  He did sleep in his car yesterday and his foot was not elevated and he had return of lower extremity swelling.  He already has resources for homeless shelters etc..  He reports his balance is not great with Darco shoe so he has been wearing a slipper.    REVIEW OF SYSTEMS:  Review of Systems   Constitutional:  Negative for chills and fever.   Respiratory:  Negative for cough.    Gastrointestinal:  Negative for nausea.   Musculoskeletal:         As stated in HPI   Skin:         As stated in HPI   Psychiatric/Behavioral: Negative.         Past Medical History:   Diagnosis Date    Essential (primary) hypertension     Neuropathy of lower extremity       Past Surgical History:   Procedure Laterality Date    COLOSTOMY  1981    DEBRIDEMENT OF FOOT Left 3/11/2025    Procedure: DEBRIDEMENT, FOOT;  Surgeon: Get Enriquez DPM;  Location: [facility];  Service: Podiatry;  Laterality: Left;    Detachment at Left 1st Toe, Complete, Open Approach Left 03/13/2017    herniated disc repaired   2015    left femur break   2008    left hip replacement  02/02/2023    sciatic nerve repair  Left 1980      Social History      Socioeconomic History    Marital status:     Number of children: 0   Occupational History    Occupation: oil    Tobacco Use    Smoking status: Former    Smokeless tobacco: Never    Tobacco comments:     Age started: 18; Age stopped: 54   Substance and Sexual Activity    Alcohol use: Yes     Comment: every 2-3 days    Drug use: Never     Social Drivers of Health     Financial Resource Strain: High Risk (3/10/2025)    Overall Financial Resource Strain (CARDIA)     Difficulty of Paying Living Expenses: Very hard   Food Insecurity: Food Insecurity Present (3/10/2025)    Hunger Vital Sign     Worried About Running Out of Food in the Last Year: Often true     Ran Out of Food in the Last Year: Often true   Transportation Needs: No Transportation Needs (4/12/2024)    PRAPARE - Transportation     Lack of Transportation (Medical): No     Lack of Transportation (Non-Medical): No   Stress: Stress Concern Present (3/10/2025)    [employer] - Occupational Stress Questionnaire     Feeling of Stress : Very much   Housing Stability: High Risk (3/10/2025)    Housing Stability Vital Sign     Unable to Pay for Housing in the Last Year: Yes     Homeless in the Last Year: Yes       Review of Most Recent Wound Care-Related Labs:  Lab Results   Component Value Date/Time    WBC 6.25 03/13/2025 04:13 AM    RBC 3.91 (L) 03/13/2025 04:13 AM    HGB 13.0 (L) 03/13/2025 04:13 AM    HGB 12.9 (L) 10/17/2023 10:44 AM    HCT 37.0 (L) 03/13/2025 04:13 AM    HCT 38.4 (L) 10/17/2023 10:44 AM    MCV 94.6 (H) 03/13/2025 04:13 AM    MCH 33.2 (H) 03/13/2025 04:13 AM    CREATININE 1.96 (H) 03/14/2025 04:11 AM    CREATININE 1.31 (H) 10/17/2023 10:44 AM    HGBA1C 6.6 03/10/2025 03:44 AM    HGBA1C 5.8 09/19/2023 11:12 AM    CRP 66.50 (H) 03/09/2025 08:24 AM        Wound-Related Imaging:            PHYSICAL EXAMINATION:  Blood pressure (!) 149/90, pulse 92, temperature 98.2 °F (36.8 °C), resp. rate 20, SpO2  98%.  General:  VSS, afebrile. No acute distress.   Respiratory: Non labored.  No coughing.  Cardiovascular:  + peripheral edema of left LE.  DP pulses present by doppler  Musculoskeletal:  No joint deformities  Neurologic:  A&O X 3.    Psychiatric:  Calm, cooperative.  Mood and effect normal.  Responses appropriate.   Integumentary:  Area of necrotic tissue debrided in clinic, no purulent drainage       Altered Skin Integrity 04/11/24 0130 Left plantar Heel Ulceration (Active)   04/11/24 0130   Altered Skin Integrity Present on Admission - Did Patient arrive to the hospital with altered skin?: yes   Side: Left   Orientation: plantar   Location: Heel   Wound Number:    Is this injury device related?:    Primary Wound Type: Ulceration   Description of Altered Skin Integrity:    Ankle-Brachial Index:    Pulses:    Removal Indication and Assessment:    Wound Outcome:    (Retired) Wound Length (cm):    (Retired) Wound Width (cm):    (Retired) Depth (cm):    Wound Description (Comments):    Removal Indications:    Wound Image   04/11/25 1002   Dressing Appearance Moist drainage 04/11/25 1002   Drainage Amount Moderate 04/11/25 1002   Drainage Characteristics/Odor Malodorous;Serosanguineous 04/11/25 1002   Appearance Pink;Red;Slough;Eschar 04/11/25 1002   Tissue loss description Full thickness 04/11/25 1002   Wound Length (cm) 5 cm 04/11/25 1002   Wound Width (cm) 5.5 cm 04/11/25 1002   Wound Depth (cm) 0.5 cm 04/11/25 1002   Wound Volume (cm^3) 7.199 cm^3 04/11/25 1002   Wound Surface Area (cm^2) 21.6 cm^2 04/11/25 1002            Wound 04/11/25 1009 Other (comment) Left posterior Toe, second (Active)   04/11/25 1009 Toe, second   Present on Original Admission: Y   Primary Wound Type: Other   Side: Left   Orientation: posterior   Wound Approximate Age at First Assessment (Weeks):    Wound Number:    Is this injury device related?:    Incision Type:    Closure Method:    Wound Description (Comments):    Type:     Additional Comments:    Ankle-Brachial Index:    Pulses:    Removal Indication and Assessment:    Wound Outcome:    Wound Image   04/11/25 1002   Dressing Appearance Moist drainage 04/11/25 1002   Drainage Amount Small 04/11/25 1002   Drainage Characteristics/Odor Serosanguineous 04/11/25 1002   Appearance Red 04/11/25 1002   Wound Length (cm) 0.1 cm 04/11/25 1002   Wound Width (cm) 0.6 cm 04/11/25 1002   Wound Depth (cm) 0.1 cm 04/11/25 1002   Wound Volume (cm^3) 0.003 cm^3 04/11/25 1002   Wound Surface Area (cm^2) 0.05 cm^2 04/11/25 1002     ASSESSMENT/PLAN:    Patient Active Problem List    Diagnosis Date Noted    Left foot infection 03/10/2025    BROOKS (acute kidney injury) 03/10/2025    Blister of left heel with infection 04/15/2024    Ulcer of left foot 04/10/2024    Cellulitis of left lower extremity 04/10/2024    Primary osteoarthritis of right hip 10/03/2023    Edema of left lower extremity 04/10/2023    Hypercalcemia 04/10/2023    History of total hip replacement, left 02/14/2023    Hospital discharge follow-up 02/12/2023    Primary osteoarthritis of both hips 01/09/2023    Hyperglycemia 01/09/2023    Pre-op examination 01/08/2023    Immunization due 11/03/2022    Neuropathy of lower extremity     Chronic midline low back pain without sciatica 07/15/2022    Essential (primary) hypertension      Left heel wound-clean with Dakin's, apply silver alginate, Drawtex, then Kerlix.  He needs to change bandage daily.  Tubigrip given to help with swelling, reinforced to elevate leg to help with swelling, Darco shoe given for offloading.  Return to clinic in 2 weeks

## 2025-04-11 NOTE — PATIENT INSTRUCTIONS
Pt seen today by: Oren Hill DO    Home health and self care DRESSING INSTRUCTIONS:        Wound location: Left foot    Dressings to be changed daily and as needed if soiled or not intact.      Cleanse wound with half strength Dakins wound cleanser  Apply silver alginate to the wound bed  Cover with drawtex edema dressing and secure with kerlix and tape  Wear darco shoe at all times    Wound location: Right foot    Dressings to be changed daily and as needed if soiled or not intact.      Cleanse wound with half strength Dakins wound cleanser  And keep dry    Compression with: tubigrip size G to bilateral legs    Return visit: 2 week    FOR ANY WORSENING CONDITION PRIOR TO NEXT APPOINTMENT REPORT TO THE NEAREST EMERGENCY ROOM    Nutrition:  The current daily value (%DV) for protein is 50 grams per day and is meant as a general goal for most people. Further increasing your dietary protein intake is very important for wound healing. Typically one needs over 100g of protein per day to help with wound healing needs.  If you are a dialysis patient or have problems with your kidneys, talk to your Nephrologist about how much protein you can take in with your condition.  Examples of high protein items that can be added to your diet include: eggs, chicken, red meats, almonds, cottage cheese, Greek yogurt, beans, and peanut butter.  Fortified protein bars, shakes and drinks can add 15-30 additional grams of protein per serving.  Also add:   1 daily general multivitamin   Vitamin C : 500mg twice daily   Zinc 220 mg daily  Vit D : once daily    Offloading   Offload your wound. This means to reduce pressure on and around the wound that reduces blood flow to the wound and prevents healing. Your wound care team will discuss specific ways for you to offload your specific wound. Common offloading strategies include:    Turn or reposition every 2 hours or sooner  Use pillows, wedges, ROHO wheelchair cushions or other special  devices like boots and shoes to lift the wound off of hard surfaces  Alternating Low Air-loss (ALAL) mattress may be ordered  Padded dressings can reduce wound pressure          Compression: You may be using a compressive type of dressings to control edema: If so, keep your compression wrap or tubigrip in place. Do not get them wet, they should feel snug. If they feel tight, or cause pain in any way,  elevate your legs above your heart for 15 minutes. If the wraps still cause pain or you can not tolerate compression,  please remove and notify the clinic or your home health.         Call our [facility] wound clinic for questions/concerns a [phone] .

## 2025-04-11 NOTE — PROCEDURES
"Debridement    Date/Time: 4/11/2025 9:30 AM    Performed by: Oren Hill DO  Authorized by: Oren Hill DO    Time out: Immediately prior to procedure a "time out" was called to verify the correct patient, procedure, equipment, support staff and site/side marked as required.    Consent Done?:  Yes (Written)  Local anesthesia used?: No      Debridement - 1st Wound - General Location: left heel.    Type of Debridement:  Excisional       Length (cm):  3.5       Width (cm):  3.1       Depth (cm):  0.1       Area (sq cm):  8.52       Percent Debrided (%):  100       Total Area Debrided (sq cm):  8.52    Depth of debridement:  Subcutaneous tissue    Devitalized tissue debrided:  Necrotic/Eschar    Instruments:  Nippers and Curette  Bleeding:  None          "

## 2025-04-30 ENCOUNTER — HOSPITAL ENCOUNTER (OUTPATIENT)
Dept: WOUND CARE | Facility: HOSPITAL | Age: 60
Discharge: HOME | End: 2025-04-30
Attending: FAMILY MEDICINE
Payer: COMMERCIAL

## 2025-04-30 VITALS
TEMPERATURE: 98 F | RESPIRATION RATE: 20 BRPM | DIASTOLIC BLOOD PRESSURE: 83 MMHG | OXYGEN SATURATION: 98 % | SYSTOLIC BLOOD PRESSURE: 139 MMHG | HEART RATE: 96 BPM

## 2025-04-30 DIAGNOSIS — S81.802D WOUND OF LEFT LOWER EXTREMITY, SUBSEQUENT ENCOUNTER: Primary | ICD-10-CM

## 2025-04-30 PROCEDURE — 27000999 HC MEDICAL RECORD PHOTO DOCUMENTATION

## 2025-04-30 PROCEDURE — 99211 OFF/OP EST MAY X REQ PHY/QHP: CPT | Mod: 27

## 2025-04-30 NOTE — PATIENT INSTRUCTIONS
Pt seen today by: Oren Hill DO    Home health and self care DRESSING INSTRUCTIONS:        Wound location: Left foot    Dressings to be changed every other day and as needed if soiled or not intact.      Cleanse wound with half strength Dakins wound cleanser  Apply polymem max to the wound bed  Cover with dry gauze and wrap with kerlix and tape      Wound location: Right foot    Dressings to be changed daily and as needed if soiled or not intact.      Cleanse wound with half strength Dakins wound cleanser  And keep dry    Compression with: tubigrip size G to bilateral legs    Return visit: 1 month    FOR ANY WORSENING CONDITION PRIOR TO NEXT APPOINTMENT REPORT TO THE NEAREST EMERGENCY ROOM    Nutrition:  The current daily value (%DV) for protein is 50 grams per day and is meant as a general goal for most people. Further increasing your dietary protein intake is very important for wound healing. Typically one needs over 100g of protein per day to help with wound healing needs.  If you are a dialysis patient or have problems with your kidneys, talk to your Nephrologist about how much protein you can take in with your condition.  Examples of high protein items that can be added to your diet include: eggs, chicken, red meats, almonds, cottage cheese, Greek yogurt, beans, and peanut butter.  Fortified protein bars, shakes and drinks can add 15-30 additional grams of protein per serving.  Also add:   1 daily general multivitamin   Vitamin C : 500mg twice daily   Zinc 220 mg daily  Vit D : once daily    Offloading   Offload your wound. This means to reduce pressure on and around the wound that reduces blood flow to the wound and prevents healing. Your wound care team will discuss specific ways for you to offload your specific wound. Common offloading strategies include:    Turn or reposition every 2 hours or sooner  Use pillows, wedges, ROHO wheelchair cushions or other special devices like boots and shoes to  lift the wound off of hard surfaces  Alternating Low Air-loss (ALAL) mattress may be ordered  Padded dressings can reduce wound pressure          Compression: You may be using a compressive type of dressings to control edema: If so, keep your compression wrap or tubigrip in place. Do not get them wet, they should feel snug. If they feel tight, or cause pain in any way,  elevate your legs above your heart for 15 minutes. If the wraps still cause pain or you can not tolerate compression,  please remove and notify the clinic or your home health.         Call our [facility] wound clinic for questions/concerns a [phone] .

## 2025-04-30 NOTE — PROGRESS NOTES
CHIEF COMPLAINT:  Chief Complaint   Patient presents with    Wound Care     HISTORY OF  PRESENT ILLNESS:  59 y.o. White male being seen today for left heel wound.  Patient was admitted on 03/09/25 and was discharged 5 days later.  He had I and D done on 03/11 by podiatrist at [facility].  He was on broad-spectrum antibiotics and was given Augmentin at discharge.  His only other medical history is high blood pressure.  Of note, patient is homeless and has limited resources.  He has been cleaning the area with water and covering.    Today's information:  Patient here for follow-up left heel wound.  He has been applying silver alginate, covering with Drawtex and secondary dressing.  He is now living full-time in his car.  He is unable to live with his sister.  He has not been offloading and not been elevating his left lower extremity as he was instructed.  I did debridement on eschar last clinic visit.  It has now returned.    REVIEW OF SYSTEMS:  Review of Systems   Constitutional:  Negative for chills and fever.   Respiratory:  Negative for cough.    Gastrointestinal:  Negative for nausea.   Musculoskeletal:         As stated in HPI   Skin:         As stated in HPI   Psychiatric/Behavioral: Negative.         Past Medical History:   Diagnosis Date    Essential (primary) hypertension     Neuropathy of lower extremity       Past Surgical History:   Procedure Laterality Date    COLOSTOMY  1981    DEBRIDEMENT OF FOOT Left 3/11/2025    Procedure: DEBRIDEMENT, FOOT;  Surgeon: Get Enriquez DPM;  Location: [facility];  Service: Podiatry;  Laterality: Left;    Detachment at Left 1st Toe, Complete, Open Approach Left 03/13/2017    herniated disc repaired   2015    left femur break   2008    left hip replacement  02/02/2023    sciatic nerve repair  Left 1980      Social History     Socioeconomic History    Marital status:     Number of children: 0   Occupational History    Occupation: oil     Tobacco Use    Smoking status: Former    Smokeless tobacco: Never    Tobacco comments:     Age started: 18; Age stopped: 54   Substance and Sexual Activity    Alcohol use: Yes     Comment: every 2-3 days    Drug use: Never     Social Drivers of Health     Financial Resource Strain: High Risk (3/10/2025)    Overall Financial Resource Strain (CARDIA)     Difficulty of Paying Living Expenses: Very hard   Food Insecurity: Food Insecurity Present (3/10/2025)    Hunger Vital Sign     Worried About Running Out of Food in the Last Year: Often true     Ran Out of Food in the Last Year: Often true   Transportation Needs: No Transportation Needs (4/12/2024)    PRAPARE - Transportation     Lack of Transportation (Medical): No     Lack of Transportation (Non-Medical): No   Stress: Stress Concern Present (3/10/2025)    [employer] - Occupational Stress Questionnaire     Feeling of Stress : Very much   Housing Stability: High Risk (3/10/2025)    Housing Stability Vital Sign     Unable to Pay for Housing in the Last Year: Yes     Homeless in the Last Year: Yes       Review of Most Recent Wound Care-Related Labs:  Lab Results   Component Value Date/Time    WBC 6.25 03/13/2025 04:13 AM    RBC 3.91 (L) 03/13/2025 04:13 AM    HGB 13.0 (L) 03/13/2025 04:13 AM    HGB 12.9 (L) 10/17/2023 10:44 AM    HCT 37.0 (L) 03/13/2025 04:13 AM    HCT 38.4 (L) 10/17/2023 10:44 AM    MCV 94.6 (H) 03/13/2025 04:13 AM    MCH 33.2 (H) 03/13/2025 04:13 AM    CREATININE 1.96 (H) 03/14/2025 04:11 AM    CREATININE 1.31 (H) 10/17/2023 10:44 AM    HGBA1C 6.6 03/10/2025 03:44 AM    HGBA1C 5.8 09/19/2023 11:12 AM    CRP 66.50 (H) 03/09/2025 08:24 AM        Wound-Related Imaging:            PHYSICAL EXAMINATION:  Blood pressure 139/83, pulse 96, temperature 98.2 °F (36.8 °C), resp. rate 20, SpO2 98%.  General:  No acute distress.   Respiratory: Non labored.  No coughing.  Cardiovascular:  + peripheral edema of left  LE  Musculoskeletal:  No joint deformities  Neurologic:  A&O X 3.    Psychiatric:  Calm, cooperative.  Mood and effect normal  Integumentary:  central area of necrotic eschar       Altered Skin Integrity 04/11/24 0130 Left plantar Heel Ulceration (Active)   04/11/24 0130   Altered Skin Integrity Present on Admission - Did Patient arrive to the hospital with altered skin?: yes   Side: Left   Orientation: plantar   Location: Heel   Wound Number:    Is this injury device related?:    Primary Wound Type: Ulceration   Description of Altered Skin Integrity:    Ankle-Brachial Index:    Pulses:    Removal Indication and Assessment:    Wound Outcome:    (Retired) Wound Length (cm):    (Retired) Wound Width (cm):    (Retired) Depth (cm):    Wound Description (Comments):    Removal Indications:    Wound Image   04/30/25 0939   Description of Altered Skin Integrity Full thickness tissue loss. Base is covered by slough and/or eschar in the wound bed 04/30/25 0939   Dressing Appearance Moist drainage 04/30/25 0939   Drainage Amount Large 04/30/25 0939   Drainage Characteristics/Odor Serosanguineous 04/30/25 0939   Appearance Pink;Eschar 04/30/25 0939   Tissue loss description Full thickness 04/30/25 0939   Periwound Area Intact;Dry 04/30/25 0939   Wound Edges Callused 04/30/25 0939   Wound Length (cm) 5.5 cm 04/30/25 0939   Wound Width (cm) 5.5 cm 04/30/25 0939   Wound Depth (cm) 0.4 cm 04/30/25 0939   Wound Volume (cm^3) 6.336 cm^3 04/30/25 0939   Wound Surface Area (cm^2) 23.76 cm^2 04/30/25 0939   Care Cleansed with:;Antimicrobial agent 04/30/25 0939            Wound 04/11/25 1009 Other (comment) Left posterior Toe, second (Active)   04/11/25 1009 Toe, second   Present on Original Admission: Y   Primary Wound Type: Other   Side: Left   Orientation: posterior   Wound Approximate Age at First Assessment (Weeks):    Wound Number:    Is this injury device related?:    Incision Type:    Closure Method:    Wound Description  (Comments):    Type:    Additional Comments:    Ankle-Brachial Index:    Pulses:    Removal Indication and Assessment:    Wound Outcome:    Wound Image   04/30/25 0941   Dressing Appearance Dry 04/30/25 0941   Drainage Amount None 04/30/25 0941   Appearance Dry 04/30/25 0941   Periwound Area Dry 04/30/25 0941   Wound Edges Undefined 04/30/25 0941   Wound Length (cm) 0.3 cm 04/30/25 0941   Wound Width (cm) 0.3 cm 04/30/25 0941   Wound Surface Area (cm^2) 0.07 cm^2 04/30/25 0941   Care Cleansed with:;Antimicrobial agent 04/30/25 0941     ASSESSMENT/PLAN:    Patient Active Problem List    Diagnosis Date Noted    Left foot infection 03/10/2025    BROOKS (acute kidney injury) 03/10/2025    Blister of left heel with infection 04/15/2024    Ulcer of left foot 04/10/2024    Cellulitis of left lower extremity 04/10/2024    Primary osteoarthritis of right hip 10/03/2023    Edema of left lower extremity 04/10/2023    Hypercalcemia 04/10/2023    History of total hip replacement, left 02/14/2023    Hospital discharge follow-up 02/12/2023    Primary osteoarthritis of both hips 01/09/2023    Hyperglycemia 01/09/2023    Pre-op examination 01/08/2023    Immunization due 11/03/2022    Neuropathy of lower extremity     Chronic midline low back pain without sciatica 07/15/2022    Essential (primary) hypertension      Left heel wound-clean with Dakin's, polymem max, then secondary dressing.  He needs to change bandage daily.  Tubigrip given to help with swelling, reinforced to elevate leg to help with swelling, Darco shoe given for offloading.  Return to clinic in 4 weeks

## 2025-05-08 ENCOUNTER — TELEPHONE (OUTPATIENT)
Dept: PRIMARY CARE CLINIC | Facility: CLINIC | Age: 60
End: 2025-05-08
Payer: COMMERCIAL

## 2025-05-08 RX ORDER — DOXYCYCLINE 100 MG/1
100 CAPSULE ORAL 2 TIMES DAILY
Qty: 28 CAPSULE | Refills: 0 | Status: SHIPPED | OUTPATIENT
Start: 2025-05-08 | End: 2025-05-22

## 2025-05-08 RX ORDER — CIPROFLOXACIN 500 MG/1
500 TABLET ORAL EVERY 12 HOURS
Qty: 28 TABLET | Refills: 0 | Status: SHIPPED | OUTPATIENT
Start: 2025-05-08 | End: 2025-05-22

## 2025-05-08 NOTE — TELEPHONE ENCOUNTER
I sent prescriptions for Cipro and doxycycline to pharmacy.  Patient needs to contact Dr. Enriquez, podiatrist.

## 2025-05-08 NOTE — TELEPHONE ENCOUNTER
Copied from CRM #[id number]. Topic: General Inquiry - Patient Advice  >> May 7, 2025  1:07 PM Krupa wrote:  Who Called: Kamaljit Frias    Caller is requesting assistance/information from provider's office.    Symptoms (please be specific): wound care of left heel in severe pain, may be infected, smelly when changing bandage   How long has patient had these symptoms:  3 days  List of preferred pharmacies on file (remove unneeded): [unfilled]  If different, enter pharmacy into here including [facility] and phone [facility] [address]   Phone: [phone]  Fax: [phone]    Preferred Method of Contact: Phone Call  Patient's Preferred Phone Number on File: [phone]   Best Call Back Number, if different:  Additional Information: medical advice, pt requesting antibiotics, please advise, thanks

## 2025-05-09 ENCOUNTER — TELEPHONE (OUTPATIENT)
Dept: PRIMARY CARE CLINIC | Facility: CLINIC | Age: 60
End: 2025-05-09
Payer: COMMERCIAL

## 2025-05-09 DIAGNOSIS — L97.521 ULCER OF LEFT FOOT, LIMITED TO BREAKDOWN OF SKIN: Primary | ICD-10-CM

## 2025-05-09 RX ORDER — HYDROCODONE BITARTRATE AND ACETAMINOPHEN 7.5; 325 MG/1; MG/1
1 TABLET ORAL EVERY 6 HOURS PRN
Qty: 20 TABLET | Refills: 0 | Status: SHIPPED | OUTPATIENT
Start: 2025-05-09

## 2025-05-09 NOTE — TELEPHONE ENCOUNTER
Copied from CRM #[id number]. Topic: Medications - Medication Question  >> May 9, 2025  8:42 AM Angela wrote:  Who Called: Kamaljit Frias    Caller is requesting assistance/information from provider's office.    Symptoms (please be specific):    How long has patient had these symptoms:    List of preferred pharmacies on file (remove unneeded): [unfilled]  If different, enter pharmacy into here including location and phone number:       Preferred Method of Contact: Phone Call  Patient's Preferred Phone Number on File: [phone]   Best Call Back Number, if different:  Additional Information: pt is requesting pain meds for foot infection he says has gotten very painful

## 2025-05-11 ENCOUNTER — HOSPITAL ENCOUNTER (INPATIENT)
Facility: HOSPITAL | Age: 60
LOS: 6 days | Discharge: HOME | DRG: 603 | End: 2025-05-17
Attending: STUDENT IN AN ORGANIZED HEALTH CARE EDUCATION/TRAINING PROGRAM | Admitting: INTERNAL MEDICINE
Payer: COMMERCIAL

## 2025-05-11 DIAGNOSIS — L97.521 ULCER OF LEFT FOOT, LIMITED TO BREAKDOWN OF SKIN: ICD-10-CM

## 2025-05-11 DIAGNOSIS — M79.89 LEFT LEG SWELLING: ICD-10-CM

## 2025-05-11 DIAGNOSIS — I73.9 PAD (PERIPHERAL ARTERY DISEASE): ICD-10-CM

## 2025-05-11 DIAGNOSIS — R60.1 ANASARCA: ICD-10-CM

## 2025-05-11 DIAGNOSIS — S91.302A WOUND OF LEFT FOOT: ICD-10-CM

## 2025-05-11 LAB
ALBUMIN SERPL-MCNC: 3.4 G/DL (ref 3.4–4.8)
ALBUMIN/GLOB SERPL: 0.7 RATIO (ref 1.1–2)
ALP SERPL-CCNC: 86 UNIT/L (ref 40–150)
ALT SERPL-CCNC: 20 UNIT/L (ref 0–55)
ANION GAP SERPL CALC-SCNC: 15 MEQ/L
AST SERPL-CCNC: 34 UNIT/L (ref 11–45)
BASOPHILS # BLD AUTO: 0.05 X10(3)/MCL
BASOPHILS NFR BLD AUTO: 0.5 %
BILIRUB SERPL-MCNC: 0.5 MG/DL
BUN SERPL-MCNC: 12.1 MG/DL (ref 8.4–25.7)
CALCIUM SERPL-MCNC: 9.3 MG/DL (ref 8.8–10)
CHLORIDE SERPL-SCNC: 97 MMOL/L (ref 98–107)
CO2 SERPL-SCNC: 26 MMOL/L (ref 23–31)
CREAT SERPL-MCNC: 0.94 MG/DL (ref 0.72–1.25)
CREAT/UREA NIT SERPL: 13
EOSINOPHIL # BLD AUTO: 0.34 X10(3)/MCL (ref 0–0.9)
EOSINOPHIL NFR BLD AUTO: 3.3 %
ERYTHROCYTE [DISTWIDTH] IN BLOOD BY AUTOMATED COUNT: 14.4 % (ref 11.5–17)
ERYTHROCYTE [SEDIMENTATION RATE] IN BLOOD: 97 MM/HR (ref 0–20)
GFR SERPLBLD CREATININE-BSD FMLA CKD-EPI: >60 ML/MIN/1.73/M2
GLOBULIN SER-MCNC: 4.8 GM/DL (ref 2.4–3.5)
GLUCOSE SERPL-MCNC: 115 MG/DL (ref 82–115)
HCT VFR BLD AUTO: 41.5 % (ref 42–52)
HGB BLD-MCNC: 13.6 G/DL (ref 14–18)
IMM GRANULOCYTES # BLD AUTO: 0.07 X10(3)/MCL (ref 0–0.04)
IMM GRANULOCYTES NFR BLD AUTO: 0.7 %
LACTATE SERPL-SCNC: 1.3 MMOL/L (ref 0.5–2.2)
LACTATE SERPL-SCNC: 2.4 MMOL/L (ref 0.5–2.2)
LYMPHOCYTES # BLD AUTO: 1.48 X10(3)/MCL (ref 0.6–4.6)
LYMPHOCYTES NFR BLD AUTO: 14.4 %
MCH RBC QN AUTO: 32.2 PG (ref 27–31)
MCHC RBC AUTO-ENTMCNC: 32.8 G/DL (ref 33–36)
MCV RBC AUTO: 98.1 FL (ref 80–94)
MONOCYTES # BLD AUTO: 0.92 X10(3)/MCL (ref 0.1–1.3)
MONOCYTES NFR BLD AUTO: 9 %
NEUTROPHILS # BLD AUTO: 7.4 X10(3)/MCL (ref 2.1–9.2)
NEUTROPHILS NFR BLD AUTO: 72.1 %
NRBC BLD AUTO-RTO: 0 %
PLATELET # BLD AUTO: 356 X10(3)/MCL (ref 130–400)
PMV BLD AUTO: 8.5 FL (ref 7.4–10.4)
POTASSIUM SERPL-SCNC: 4.1 MMOL/L (ref 3.5–5.1)
PROT SERPL-MCNC: 8.2 GM/DL (ref 5.8–7.6)
RBC # BLD AUTO: 4.23 X10(6)/MCL (ref 4.7–6.1)
SODIUM SERPL-SCNC: 138 MMOL/L (ref 136–145)
WBC # BLD AUTO: 10.26 X10(3)/MCL (ref 4.5–11.5)

## 2025-05-11 PROCEDURE — 63600175 PHARM REV CODE 636 W HCPCS: Performed by: NURSE PRACTITIONER

## 2025-05-11 PROCEDURE — 63600175 PHARM REV CODE 636 W HCPCS: Performed by: INTERNAL MEDICINE

## 2025-05-11 PROCEDURE — 25000003 PHARM REV CODE 250: Performed by: NURSE PRACTITIONER

## 2025-05-11 PROCEDURE — 80053 COMPREHEN METABOLIC PANEL: CPT

## 2025-05-11 PROCEDURE — 85652 RBC SED RATE AUTOMATED: CPT | Performed by: NURSE PRACTITIONER

## 2025-05-11 PROCEDURE — 87040 BLOOD CULTURE FOR BACTERIA: CPT

## 2025-05-11 PROCEDURE — 85025 COMPLETE CBC W/AUTO DIFF WBC: CPT

## 2025-05-11 PROCEDURE — 11000001 HC ACUTE MED/SURG PRIVATE ROOM

## 2025-05-11 PROCEDURE — 83605 ASSAY OF LACTIC ACID: CPT

## 2025-05-11 PROCEDURE — 99285 EMERGENCY DEPT VISIT HI MDM: CPT | Mod: 25

## 2025-05-11 RX ORDER — HYDROCODONE BITARTRATE AND ACETAMINOPHEN 7.5; 325 MG/1; MG/1
1 TABLET ORAL EVERY 6 HOURS PRN
Refills: 0 | Status: DISCONTINUED | OUTPATIENT
Start: 2025-05-11 | End: 2025-05-12

## 2025-05-11 RX ORDER — ENOXAPARIN SODIUM 100 MG/ML
40 INJECTION SUBCUTANEOUS EVERY 24 HOURS
Status: DISCONTINUED | OUTPATIENT
Start: 2025-05-11 | End: 2025-05-17 | Stop reason: HOSPADM

## 2025-05-11 RX ORDER — VANCOMYCIN HCL IN 5 % DEXTROSE 1G/250ML
1000 PLASTIC BAG, INJECTION (ML) INTRAVENOUS
Status: DISCONTINUED | OUTPATIENT
Start: 2025-05-11 | End: 2025-05-11 | Stop reason: CLARIF

## 2025-05-11 RX ORDER — SODIUM CHLORIDE 0.9 % (FLUSH) 0.9 %
10 SYRINGE (ML) INJECTION
Status: DISCONTINUED | OUTPATIENT
Start: 2025-05-11 | End: 2025-05-17 | Stop reason: HOSPADM

## 2025-05-11 RX ORDER — TALC
6 POWDER (GRAM) TOPICAL NIGHTLY PRN
Status: DISCONTINUED | OUTPATIENT
Start: 2025-05-11 | End: 2025-05-17 | Stop reason: HOSPADM

## 2025-05-11 RX ORDER — ACETAMINOPHEN 325 MG/1
650 TABLET ORAL EVERY 8 HOURS PRN
Status: DISCONTINUED | OUTPATIENT
Start: 2025-05-11 | End: 2025-05-17 | Stop reason: HOSPADM

## 2025-05-11 RX ORDER — KETOROLAC TROMETHAMINE 30 MG/ML
15 INJECTION, SOLUTION INTRAMUSCULAR; INTRAVENOUS EVERY 6 HOURS PRN
Status: DISPENSED | OUTPATIENT
Start: 2025-05-11 | End: 2025-05-14

## 2025-05-11 RX ORDER — ONDANSETRON HYDROCHLORIDE 2 MG/ML
4 INJECTION, SOLUTION INTRAVENOUS EVERY 8 HOURS PRN
Status: DISCONTINUED | OUTPATIENT
Start: 2025-05-11 | End: 2025-05-17 | Stop reason: HOSPADM

## 2025-05-11 RX ADMIN — ENOXAPARIN SODIUM 40 MG: 40 INJECTION SUBCUTANEOUS at 11:05

## 2025-05-11 RX ADMIN — HYDROCODONE BITARTRATE AND ACETAMINOPHEN 1 TABLET: 7.5; 325 TABLET ORAL at 09:05

## 2025-05-11 RX ADMIN — VANCOMYCIN HYDROCHLORIDE 1500 MG: 1.5 INJECTION, POWDER, LYOPHILIZED, FOR SOLUTION INTRAVENOUS at 10:05

## 2025-05-11 RX ADMIN — PIPERACILLIN SODIUM AND TAZOBACTAM SODIUM 4.5 G: 4; .5 INJECTION, POWDER, LYOPHILIZED, FOR SOLUTION INTRAVENOUS at 09:05

## 2025-05-11 NOTE — FIRST PROVIDER EVALUATION
"Medical screening examination initiated.  I have conducted a focused provider triage encounter, findings are as follows:    Brief history of present illness:  59 year old male presents to ER with c/o left foot wound x 1 month. States that he is currently in wound care. Denies fever    Vitals:    05/11/25 1623   BP: (!) 142/84   BP Location: Left arm   Pulse: (!) 113   Resp: 20   Temp: 97.9 °F (36.6 °C)   SpO2: 95%   Weight: 111.1 kg (245 lb)   Height: 6' 1" (1.854 m)       Pertinent physical exam:  awake and alert, nad    Brief workup plan:  labs, imaging     Preliminary workup initiated; this workup will be continued and followed by the physician or advanced practice provider that is assigned to the patient when roomed.  "

## 2025-05-12 LAB
ALBUMIN SERPL-MCNC: 2.8 G/DL (ref 3.4–4.8)
ALBUMIN/GLOB SERPL: 0.8 RATIO (ref 1.1–2)
ALP SERPL-CCNC: 65 UNIT/L (ref 40–150)
ALT SERPL-CCNC: 14 UNIT/L (ref 0–55)
ANION GAP SERPL CALC-SCNC: 12 MEQ/L
APICAL FOUR CHAMBER EJECTION FRACTION: 64 %
APICAL TWO CHAMBER EJECTION FRACTION: 60 %
AST SERPL-CCNC: 19 UNIT/L (ref 11–45)
AV INDEX (PROSTH): 0.86
AV MEAN GRADIENT: 7 MMHG
AV PEAK GRADIENT: 13 MMHG
AV VALVE AREA BY VELOCITY RATIO: 2.9 CM²
AV VALVE AREA: 3 CM²
AV VELOCITY RATIO: 0.83
BASOPHILS # BLD AUTO: 0.05 X10(3)/MCL
BASOPHILS NFR BLD AUTO: 0.7 %
BILIRUB SERPL-MCNC: 0.6 MG/DL
BNP BLD-MCNC: 47.3 PG/ML
BSA FOR ECHO PROCEDURE: 2.47 M2
BUN SERPL-MCNC: 12.9 MG/DL (ref 8.4–25.7)
CALCIUM SERPL-MCNC: 8.8 MG/DL (ref 8.8–10)
CHLORIDE SERPL-SCNC: 101 MMOL/L (ref 98–107)
CO2 SERPL-SCNC: 26 MMOL/L (ref 23–31)
CREAT SERPL-MCNC: 0.91 MG/DL (ref 0.72–1.25)
CREAT/UREA NIT SERPL: 14
CRP SERPL-MCNC: 47.1 MG/L
CV ECHO LV RWT: 0.48 CM
DOP CALC AO PEAK VEL: 1.8 M/S
DOP CALC AO VTI: 31.1 CM
DOP CALC LVOT AREA: 3.5 CM2
DOP CALC LVOT DIAMETER: 2.1 CM
DOP CALC LVOT PEAK VEL: 1.5 M/S
DOP CALC LVOT STROKE VOLUME: 92.1 CM3
DOP CALC MV VTI: 29.9 CM
DOP CALCLVOT PEAK VEL VTI: 26.6 CM
E WAVE DECELERATION TIME: 206 MSEC
E/A RATIO: 0.71
E/E' RATIO: 12 M/S
ECHO LV POSTERIOR WALL: 1 CM (ref 0.6–1.1)
EOSINOPHIL # BLD AUTO: 0.3 X10(3)/MCL (ref 0–0.9)
EOSINOPHIL NFR BLD AUTO: 3.9 %
ERYTHROCYTE [DISTWIDTH] IN BLOOD BY AUTOMATED COUNT: 14.4 % (ref 11.5–17)
FRACTIONAL SHORTENING: 42.9 % (ref 28–44)
GFR SERPLBLD CREATININE-BSD FMLA CKD-EPI: >60 ML/MIN/1.73/M2
GLOBULIN SER-MCNC: 3.7 GM/DL (ref 2.4–3.5)
GLUCOSE SERPL-MCNC: 110 MG/DL (ref 82–115)
HCT VFR BLD AUTO: 35.1 % (ref 42–52)
HGB BLD-MCNC: 11.5 G/DL (ref 14–18)
HR MV ECHO: 98 BPM
IMM GRANULOCYTES # BLD AUTO: 0.06 X10(3)/MCL (ref 0–0.04)
IMM GRANULOCYTES NFR BLD AUTO: 0.8 %
INTERVENTRICULAR SEPTUM: 1.1 CM (ref 0.6–1.1)
LEFT ATRIUM AREA SYSTOLIC (APICAL 2 CHAMBER): 21.1 CM2
LEFT ATRIUM AREA SYSTOLIC (APICAL 4 CHAMBER): 17 CM2
LEFT ATRIUM SIZE: 3.2 CM
LEFT ATRIUM VOLUME INDEX MOD: 20 ML/M2
LEFT ATRIUM VOLUME MOD: 49 ML
LEFT INTERNAL DIMENSION IN SYSTOLE: 2.4 CM (ref 2.1–4)
LEFT VENTRICLE DIASTOLIC VOLUME INDEX: 32.78 ML/M2
LEFT VENTRICLE DIASTOLIC VOLUME: 79 ML
LEFT VENTRICLE END DIASTOLIC VOLUME APICAL 2 CHAMBER: 107 ML
LEFT VENTRICLE END DIASTOLIC VOLUME APICAL 4 CHAMBER: 118 ML
LEFT VENTRICLE END SYSTOLIC VOLUME APICAL 2 CHAMBER: 66.3 ML
LEFT VENTRICLE END SYSTOLIC VOLUME APICAL 4 CHAMBER: 34.4 ML
LEFT VENTRICLE MASS INDEX: 61 G/M2
LEFT VENTRICLE SYSTOLIC VOLUME INDEX: 8.3 ML/M2
LEFT VENTRICLE SYSTOLIC VOLUME: 20 ML
LEFT VENTRICULAR INTERNAL DIMENSION IN DIASTOLE: 4.2 CM (ref 3.5–6)
LEFT VENTRICULAR MASS: 147 G
LV LATERAL E/E' RATIO: 12.3 M/S
LV SEPTAL E/E' RATIO: 12.3 M/S
LVED V (TEICH): 78.6 ML
LVES V (TEICH): 20.2 ML
LVOT MG: 5 MMHG
LVOT MV: 1.01 CM/S
LYMPHOCYTES # BLD AUTO: 1.6 X10(3)/MCL (ref 0.6–4.6)
LYMPHOCYTES NFR BLD AUTO: 21 %
MCH RBC QN AUTO: 32 PG (ref 27–31)
MCHC RBC AUTO-ENTMCNC: 32.8 G/DL (ref 33–36)
MCV RBC AUTO: 97.8 FL (ref 80–94)
MONOCYTES # BLD AUTO: 0.82 X10(3)/MCL (ref 0.1–1.3)
MONOCYTES NFR BLD AUTO: 10.8 %
MV MEAN GRADIENT: 4 MMHG
MV PEAK A VEL: 1.39 M/S
MV PEAK E VEL: 0.98 M/S
MV PEAK GRADIENT: 8 MMHG
MV STENOSIS PRESSURE HALF TIME: 51 MS
MV VALVE AREA BY CONTINUITY EQUATION: 3.08 CM2
MV VALVE AREA P 1/2 METHOD: 4.31 CM2
NEUTROPHILS # BLD AUTO: 4.78 X10(3)/MCL (ref 2.1–9.2)
NEUTROPHILS NFR BLD AUTO: 62.8 %
NRBC BLD AUTO-RTO: 0 %
OHS LV EJECTION FRACTION SIMPSONS BIPLANE MOD: 64 %
PISA TR MAX VEL: 2.3 M/S
PLATELET # BLD AUTO: 303 X10(3)/MCL (ref 130–400)
PMV BLD AUTO: 8.6 FL (ref 7.4–10.4)
POTASSIUM SERPL-SCNC: 3.9 MMOL/L (ref 3.5–5.1)
PROT SERPL-MCNC: 6.5 GM/DL (ref 5.8–7.6)
RA PRESSURE ESTIMATED: 3 MMHG
RBC # BLD AUTO: 3.59 X10(6)/MCL (ref 4.7–6.1)
RV TB RVSP: 5 MMHG
SINUS: 3.9 CM
SODIUM SERPL-SCNC: 139 MMOL/L (ref 136–145)
TDI LATERAL: 0.08 M/S
TDI SEPTAL: 0.08 M/S
TDI: 0.08 M/S
TR MAX PG: 21 MMHG
TRICUSPID ANNULAR PLANE SYSTOLIC EXCURSION: 2.6 CM
TV REST PULMONARY ARTERY PRESSURE: 24 MMHG
WBC # BLD AUTO: 7.61 X10(3)/MCL (ref 4.5–11.5)
Z-SCORE OF LEFT VENTRICULAR DIMENSION IN END DIASTOLE: -9.79
Z-SCORE OF LEFT VENTRICULAR DIMENSION IN END SYSTOLE: -8.1

## 2025-05-12 PROCEDURE — 85025 COMPLETE CBC W/AUTO DIFF WBC: CPT | Performed by: INTERNAL MEDICINE

## 2025-05-12 PROCEDURE — 63600175 PHARM REV CODE 636 W HCPCS: Performed by: INTERNAL MEDICINE

## 2025-05-12 PROCEDURE — 36415 COLL VENOUS BLD VENIPUNCTURE: CPT | Performed by: INTERNAL MEDICINE

## 2025-05-12 PROCEDURE — 25000003 PHARM REV CODE 250: Performed by: NURSE PRACTITIONER

## 2025-05-12 PROCEDURE — 80053 COMPREHEN METABOLIC PANEL: CPT | Performed by: INTERNAL MEDICINE

## 2025-05-12 PROCEDURE — 86140 C-REACTIVE PROTEIN: CPT | Performed by: INTERNAL MEDICINE

## 2025-05-12 PROCEDURE — 11000001 HC ACUTE MED/SURG PRIVATE ROOM

## 2025-05-12 PROCEDURE — 83880 ASSAY OF NATRIURETIC PEPTIDE: CPT | Performed by: INTERNAL MEDICINE

## 2025-05-12 PROCEDURE — 25000003 PHARM REV CODE 250: Performed by: INTERNAL MEDICINE

## 2025-05-12 PROCEDURE — 87070 CULTURE OTHR SPECIMN AEROBIC: CPT | Performed by: PODIATRIST

## 2025-05-12 PROCEDURE — 87075 CULTR BACTERIA EXCEPT BLOOD: CPT | Performed by: PODIATRIST

## 2025-05-12 PROCEDURE — 63600175 PHARM REV CODE 636 W HCPCS: Performed by: NURSE PRACTITIONER

## 2025-05-12 RX ORDER — AMLODIPINE BESYLATE 5 MG/1
10 TABLET ORAL DAILY
Status: DISCONTINUED | OUTPATIENT
Start: 2025-05-12 | End: 2025-05-17 | Stop reason: HOSPADM

## 2025-05-12 RX ORDER — HYDROCODONE BITARTRATE AND ACETAMINOPHEN 7.5; 325 MG/1; MG/1
1 TABLET ORAL EVERY 6 HOURS PRN
Refills: 0 | Status: DISCONTINUED | OUTPATIENT
Start: 2025-05-12 | End: 2025-05-17 | Stop reason: HOSPADM

## 2025-05-12 RX ORDER — FUROSEMIDE 10 MG/ML
40 INJECTION INTRAMUSCULAR; INTRAVENOUS ONCE
Status: COMPLETED | OUTPATIENT
Start: 2025-05-12 | End: 2025-05-12

## 2025-05-12 RX ADMIN — VANCOMYCIN HYDROCHLORIDE 1500 MG: 1.5 INJECTION, POWDER, LYOPHILIZED, FOR SOLUTION INTRAVENOUS at 09:05

## 2025-05-12 RX ADMIN — FUROSEMIDE 40 MG: 10 INJECTION, SOLUTION INTRAVENOUS at 01:05

## 2025-05-12 RX ADMIN — PIPERACILLIN SODIUM AND TAZOBACTAM SODIUM 4.5 G: 4; .5 INJECTION, POWDER, LYOPHILIZED, FOR SOLUTION INTRAVENOUS at 01:05

## 2025-05-12 RX ADMIN — KETOROLAC TROMETHAMINE 15 MG: 30 INJECTION, SOLUTION INTRAMUSCULAR at 01:05

## 2025-05-12 RX ADMIN — AMLODIPINE BESYLATE 10 MG: 5 TABLET ORAL at 09:05

## 2025-05-12 RX ADMIN — HYDROCODONE BITARTRATE AND ACETAMINOPHEN 1 TABLET: 7.5; 325 TABLET ORAL at 05:05

## 2025-05-12 RX ADMIN — HYDROCODONE BITARTRATE AND ACETAMINOPHEN 1 TABLET: 7.5; 325 TABLET ORAL at 11:05

## 2025-05-12 RX ADMIN — HYDROCODONE BITARTRATE AND ACETAMINOPHEN 1 TABLET: 7.5; 325 TABLET ORAL at 12:05

## 2025-05-12 RX ADMIN — PIPERACILLIN SODIUM AND TAZOBACTAM SODIUM 4.5 G: 4; .5 INJECTION, POWDER, LYOPHILIZED, FOR SOLUTION INTRAVENOUS at 09:05

## 2025-05-12 RX ADMIN — PIPERACILLIN SODIUM AND TAZOBACTAM SODIUM 4.5 G: 4; .5 INJECTION, POWDER, LYOPHILIZED, FOR SOLUTION INTRAVENOUS at 04:05

## 2025-05-12 NOTE — PROGRESS NOTES
"Pharmacokinetic Initial Assessment: IV Vancomycin    Assessment/Plan:    Initiate intravenous vancomycin with loading dose of 1500 mg once followed by a maintenance dose of vancomycin 1500 mg IV every 12 hours  Desired empiric serum trough concentration is 15 to 20 mcg/mL  Draw vancomycin trough level 60 min prior to fourth dose on 05/13 at approximately 0900  Pharmacy will continue to follow and monitor vancomycin.      Please contact pharmacy at extension [phone] with any questions regarding this assessment.     Thank you for the consult,   Basil Littlejohn       Patient brief summary:  Kamaljit Frias is a 59 y.o. male initiated on antimicrobial therapy with IV Vancomycin for treatment of suspected skin & soft tissue infection    Drug Allergies:   Review of patient's allergies indicates:  No Known Allergies    Actual Body Weight:   111.1 kg    Renal Function:   Estimated Creatinine Clearance: 107.9 mL/min (based on SCr of 0.94 mg/dL).,     Dialysis Method (if applicable):  N/A    CBC (last 72 hours):  Recent Labs   Lab Result Units 05/11/25  1836   WBC x10(3)/mcL 10.26   Hgb g/dL 13.6*   Hct % 41.5*   Platelet x10(3)/mcL 356   Mono % % 9.0   Eos % % 3.3   Basophil % % 0.5       Metabolic Panel (last 72 hours):  Recent Labs   Lab Result Units 05/11/25  1836   Sodium mmol/L 138   Potassium mmol/L 4.1   Chloride mmol/L 97*   CO2 mmol/L 26   Glucose mg/dL 115   Blood Urea Nitrogen mg/dL 12.1   Creatinine mg/dL 0.94   Albumin g/dL 3.4   Bilirubin Total mg/dL 0.5   ALP unit/L 86   AST unit/L 34   ALT unit/L 20       Drug levels (last 3 results):  No results for input(s): "VANCOMYCINRA", "VANCORANDOM", "VANCOMYCINPE", "VANCOPEAK", "VANCOMYCINTR", "VANCOTROUGH" in the last 72 hours.    Microbiologic Results:  Microbiology Results (last 7 days)       Procedure Component Value Units Date/Time    Blood culture #1 **CANNOT BE ORDERED STAT** [[id number]] Collected: 05/11/25 2041    Order Status: Sent Specimen: Blood     Blood culture #2 " **CANNOT BE ORDERED STAT** [[id number]] Collected: 05/11/25 2041    Order Status: Sent Specimen: Blood

## 2025-05-12 NOTE — ED PROVIDER NOTES
"Encounter Date: 5/11/2025       History     Chief Complaint   Patient presents with    Wound Check     Wound to L foot x 1 month, past 3 days increased pain and "smells like shrimp" denies Hx DM. Podiatrist is Zoe. Denies any fever at home.      See MDM    The history is provided by the patient. No  was used.     Review of patient's allergies indicates:  No Known Allergies  Past Medical History:   Diagnosis Date    Essential (primary) hypertension     Neuropathy of lower extremity      Past Surgical History:   Procedure Laterality Date    COLOSTOMY  1981    DEBRIDEMENT OF FOOT Left 3/11/2025    Procedure: DEBRIDEMENT, FOOT;  Surgeon: Get Enriquez DPM;  Location: [facility];  Service: Podiatry;  Laterality: Left;    Detachment at Left 1st Toe, Complete, Open Approach Left 03/13/2017    herniated disc repaired   2015    left femur break   2008    left hip replacement  02/02/2023    sciatic nerve repair  Left 1980     Family History   Problem Relation Name Age of Onset    No Known Problems Mother      Stroke Father      Fibromyalgia Sister      No Known Problems Sister       Social History[1]  Review of Systems   Skin:  Positive for wound (left heel wound).   All other systems reviewed and are negative.      Physical Exam     Initial Vitals [05/11/25 1623]   BP Pulse Resp Temp SpO2   (!) 142/84 (!) 113 20 97.9 °F (36.6 °C) 95 %      MAP       --         Physical Exam    Nursing note and vitals reviewed.  Constitutional: He appears well-developed and well-nourished.   Eyes: Conjunctivae are normal.   Cardiovascular:  Normal rate and intact distal pulses.           Pulmonary/Chest: No respiratory distress.   Musculoskeletal:        Feet:       Comments: Pink/red extending up the lower leg. Left leg more swollen than right leg      Neurological: He is alert and oriented to person, place, and time.   Psychiatric: He has a normal mood and affect.         ED Course   Procedures  Labs Reviewed "   COMPREHENSIVE METABOLIC PANEL - Abnormal       Result Value    Sodium 138      Potassium 4.1      Chloride 97 (*)     CO2 26      Glucose 115      Blood Urea Nitrogen 12.1      Creatinine 0.94      Calcium 9.3      Protein Total 8.2 (*)     Albumin 3.4      Globulin 4.8 (*)     Albumin/Globulin Ratio 0.7 (*)     Bilirubin Total 0.5      ALP 86      ALT 20      AST 34      eGFR >60      Anion Gap 15.0      BUN/Creatinine Ratio 13     LACTIC ACID, PLASMA - Abnormal    Lactic Acid Level 2.4 (*)    CBC WITH DIFFERENTIAL - Abnormal    WBC 10.26      RBC 4.23 (*)     Hgb 13.6 (*)     Hct 41.5 (*)     MCV 98.1 (*)     MCH 32.2 (*)     MCHC 32.8 (*)     RDW 14.4      Platelet 356      MPV 8.5      Neut % 72.1      Lymph % 14.4      Mono % 9.0      Eos % 3.3      Basophil % 0.5      Imm Grans % 0.7      Neut # 7.40      Lymph # 1.48      Mono # 0.92      Eos # 0.34      Baso # 0.05      Imm Gran # 0.07 (*)     NRBC% 0.0     SEDIMENTATION RATE, AUTOMATED - Abnormal    Sed Rate 97 (*)    BLOOD CULTURE [facility]   BLOOD CULTURE [facility]   CBC W/ AUTO DIFFERENTIAL    Narrative:     The following orders were created for panel order CBC auto differential.  Procedure                               Abnormality         Status                     ---------                               -----------         ------                     CBC with Differential[[id number]]       Abnormal            Final result                 Please view results for these tests on the individual orders.   LACTIC ACID, PLASMA          Imaging Results              X-Ray Foot Complete Left (Final result)  Result time 05/11/25 16:57:25      Final result by Kaiser Ambrocio MD (05/11/25 16:57:25)                   Impression:      No acute osseous finding.      Electronically signed by: Kaiser Ambrocio MD  Date:    05/11/2025  Time:    16:57               Narrative:    EXAMINATION:  Left foot three views    CLINICAL HISTORY:  Wound    COMPARISON:  None    FINDINGS:  There  is no acute fracture subluxation seen.  There are postop changes from amputation of the great toe.  There is chronic excrescence from the distal tip of the 2nd digit.  There is no erosion or osseous destruction seen.  Moderate degenerative changes noted at the dorsal talonavicular joint.  There is soft tissue swelling.  No radiopaque foreign body.  There is question ulceration of lung the plantar aspect of the foot                                       Medications   piperacillin-tazobactam (ZOSYN) 4.5 g in D5W 100 mL IVPB (MB+) (has no administration in time range)   vancomycin (VANCOCIN) 1,500 mg in D5W 250 mL IVPB (admixture device) (has no administration in time range)   HYDROcodone-acetaminophen 7.5-325 mg per tablet 1 tablet (has no administration in time range)     Medical Decision Making  60 y/o male presents with c/o left heel wound that has been there for about a year but progressively worsening over the last week. He was seeing wound care however not only monthly if that. He is also living out of his car now since losing his room mate and unable to afford housing on his own. He did stay with his sister briefly but doesn't want to be a burden to anyone. He has seen dr. Enriquez for this wound previously. He is also having a lot more swelling to the left leg than right as well.     No leukocytosis however lactate 2.4 and sed rate 97. Cultures pending. Xray shows no osseous involvement. US neg DVT to left leg.     Wound appears acutely infected with purulent drainage and foul odor along with redness moving up his lower leg.     Amount and/or Complexity of Data Reviewed  Labs: ordered. Decision-making details documented in ED Course.  Radiology: ordered. Decision-making details documented in ED Course.  Discussion of management or test interpretation with external provider(s): Discussed with Dr. Vaca who had face to face with patient.     Discussed with Dr. Morgan who accepts admission.     Risk  Prescription  drug management.  Decision regarding hospitalization.      Additional MDM:   Differential Diagnosis:   Other: The following diagnoses were also considered and will be evaluated: cellulitis, ostemyelitis and sepsis.                                   Clinical Impression:  Final diagnoses:  [S91.302A] Wound of left foot  [M79.89] Left leg swelling          ED Disposition Condition    Admit                   [1]   Social History  Tobacco Use    Smoking status: Former    Smokeless tobacco: Never    Tobacco comments:     Age started: 18; Age stopped: 54   Substance Use Topics    Alcohol use: Yes     Comment: every 2-3 days    Drug use: Never        Richa Abebe FNP  05/11/25 9085

## 2025-05-12 NOTE — PROGRESS NOTES
[facility] - 5th Floor Med Surg  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    Present to the ED on 05/11/2025 with chief complaint of several days of foul-smelling purulent drainage from his left heel wound.  Denies any fever or chills.  Report significant bilateral lower extremity swelling that has been increasing over the past 2 months.  Denies dyspnea, orthopnea or PNDs.    Upon arrival to ED was afebrile and hemodynamically stable, labs notable for no leukocytosis or left shift, ESR 97 and CRP 47.  BLE venous ultrasound showed no evidence of DVT.    He was blood cultured and started IV antibiotics, and referred to hospital medicine service and podiatry consulted..    ROS: Except as documented, all systems reviewed and negative.    Interval History:  No overnight event repeat labs without interval change, awaiting Podiatry evaluation.    Objective   Physical exam:  Vital Signs  Temp:  [98 °F (36.7 °C)-98.7 °F (37.1 °C)]   Pulse:  []   Resp:  [18-21]   BP: (128-157)/(85-96)   SpO2:  [94 %-96 %]    General: Appears comfortable  HEENT: NC/AT  Neck:  No JVD  Chest: CTABL  CVS: Regular rhythm. Normal S1/S2.  Abdomen: nondistended, normoactive BS, soft and non-tender.  MSK: No obvious deformity or joint swelling  Skin:  Erythema extending from the left  foot to circumferentially just below the knee      Neuro: AAOx3  Psych: Cooperative    Labs & diagnostics  Recent Labs      05/11/25 1836 05/12/25 0411   WBC 10.26 7.61   HGB 13.6* 11.5*   HCT 41.5* 35.1*    303      Recent Labs     05/11/25 1836 05/12/25 0411    139   K 4.1 3.9   CL 97* 101   CO2 26 26   BUN 12.1 12.9   CREATININE 0.94 0.91   EGFRNORACEVR >60 >60   CALCIUM 9.3 8.8   ALBUMIN 3.4 2.8*   GLOBULIN 4.8* 3.7*   ALKPHOS 86 65   ALT 20 14   AST 34 19   BILITOT 0.5 0.6        Microbiology Results (last 7 days)       Procedure Component Value Units Date/Time    Blood culture #2 **CANNOT BE ORDERED STAT** [[id number]] Collected: 05/11/25 2041    Order Status: Resulted Specimen: Blood Updated: 05/11/25 2216    Blood culture #1 **CANNOT BE ORDERED STAT** [[id number]] Collected: 05/11/25 2041    Order Status: Resulted Specimen: Blood Updated: 05/11/25 2216           X-Ray Foot Complete Left  Narrative: EXAMINATION:  Left foot three views    CLINICAL HISTORY:  Wound    COMPARISON:  None    FINDINGS:  There is no acute fracture subluxation seen.  There are postop changes from amputation of the great toe.  There is chronic excrescence from the distal tip of the 2nd digit.  There is no erosion or osseous destruction seen.  Moderate degenerative changes noted at the dorsal talonavicular joint.  There is soft tissue swelling.  No radiopaque foreign body.  There is question ulceration of lung the plantar aspect of the foot  Impression: No acute osseous finding.    Electronically signed by: Kaiser Ambrocio MD  Date:    05/11/2025  Time:    16:57      Inpatient Medications  Scheduled Med:   amLODIPine  10 mg Oral Daily    enoxparin  40 mg Subcutaneous Daily    piperacillin-tazobactam (Zosyn) IV (PEDS and ADULTS) (extended infusion is not appropriate)  4.5 g Intravenous Q8H    vancomycin 1,500 mg in D5W 250 mL IVPB (admixture device)  1,500 mg Intravenous Q12H      Continuous Infusions:     PRN Meds:    Current Facility-Administered Medications:     acetaminophen, 650 mg, Oral, Q8H PRN    HYDROcodone-acetaminophen, 1 tablet,  Oral, Q6H PRN    ketorolac, 15 mg, Intravenous, Q6H PRN    melatonin, 6 mg, Oral, Nightly PRN    ondansetron, 4 mg, Intravenous, Q8H PRN    sodium chloride 0.9%, 10 mL, Intravenous, PRN    vancomycin - pharmacy to dose, , Intravenous, pharmacy to manage frequency     Assessment:   Infected chronic left heel ulcer with left foot and leg cellulitis  Chronic BLE edema or lymphedema    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      VTE Prophylaxis:-enoxaparin 40 mg subQ daily  Patient condition:-stable  Disposition: JOE Hernandez MD  Internal Medicine

## 2025-05-12 NOTE — H&P
"[facility] Hospital Medicine History & Physical Examination       Patient Name: Kamaljit Frias  MRN: [id number]  Patient Class: IP- Inpatient   Admission Date: 5/11/2025  4:24 PM  Length of Stay: 0  Admitting Service: Hospital Medicine   Attending Physician: Oren Morgan MD   Primary Care Provider: Jian Walter MD  History source: EMR, patient and/or patient's family  Screening for Social Drivers for health: Patient screened for food insecurity, housing instability, transportation needs, utility   difficulties, and interpersonal safety (select all that apply as identified as concern)  [x]Housing or Food  []Transportation Needs  [x]Utility Difficulties  []Interpersonal safety  []None    CHIEF COMPLAINT   Wound Check (Wound to L foot x 1 month, past 3 days increased pain and "smells like shrimp" denies Hx DM. Podiatrist is [place]. Denies any fever at home. )    HISTORY OF PRESENT ILLNESS:   Patient is a 59-year-old male with a past medical history of HTN, sciatic nerve injury with significant left lower extremity neuropathy, prediabetes, HTN, BECK and a recent admission in March of 2025 for infection of a chronic left heel wound that required incision and drainage on 03/11/2025.  He presents to the ER tonight with several days of foul-smelling purulent drainage from his left heel wound.  Patient notes he is homeless in his living in a car for at least a year now.    He arrived to the ER afebrile, tachycardic but hemodynamically stable maintaining normal sats on room air.  Laboratory work showed elevated inflammatory workers and a mildly elevated lactic acid.  Venous ultrasound of the lower extremity showed no DVT, x-ray showed no changes suggesting osteomyelitis.  He was started on broad-spectrum empiric antibiotics and admitted to the hospitalist service with consultation to his podiatrist Dr. Enriquez.    PAST MEDICAL HISTORY:     Past Medical History:   Diagnosis Date    " Essential (primary) hypertension     Neuropathy of lower extremity        PAST SURGICAL HISTORY:     Past Surgical History:   Procedure Laterality Date    COLOSTOMY  1981    DEBRIDEMENT OF FOOT Left 3/11/2025    Procedure: DEBRIDEMENT, FOOT;  Surgeon: Get Enriquez DPM;  Location: [facility];  Service: Podiatry;  Laterality: Left;    Detachment at Left 1st Toe, Complete, Open Approach Left 03/13/2017    herniated disc repaired   2015    left femur break   2008    left hip replacement  02/02/2023    sciatic nerve repair  Left 1980       ALLERGIES:   Patient has no known allergies.    FAMILY HISTORY:   Reviewed and non-contributory     SOCIAL HISTORY:     Social History     Tobacco Use    Smoking status: Former    Smokeless tobacco: Never    Tobacco comments:     Age started: 18; Age stopped: 54   Substance Use Topics    Alcohol use: Yes     Comment: every 2-3 days        HOME MEDICATIONS:     Prior to Admission medications    Medication Sig   amLODIPine (NORVASC) 10 MG tablet Take 1 tablet (10 mg total) by mouth once daily.   ciprofloxacin HCl (CIPRO) 500 MG tablet Take 1 tablet (500 mg total) by mouth every 12 (twelve) hours. for 14 days   doxycycline (DORYX) 100 MG EC tablet Take 100 mg by mouth 2 (two) times daily.   doxycycline (VIBRAMYCIN) 100 MG Cap Take 1 capsule (100 mg total) by mouth 2 (two) times daily. for 14 days   HYDROcodone-acetaminophen (NORCO) 7.5-325 mg per tablet Take 1 tablet by mouth every 6 (six) hours as needed for Pain.   senna-docusate 8.6-50 mg (PERICOLACE) 8.6-50 mg per tablet Take 1 tablet by mouth once daily.       REVIEW OF SYSTEMS:   Except as documented, all other systems reviewed and negative     PHYSICAL EXAM:   T 98.5 °F (36.9 °C)   BP (!) 157/96   P 99   RR 20   O2 96 %  GENERAL: awake, alert, oriented and in no acute distress, non-toxic appearing   HEENT: normocephalic atraumatic   NECK: supple   LUNGS: Clear bilaterally, no wheezing or rales, no accessory muscle use   CVS:  "Regular rate and rhythm, normal peripheral perfusion  ABD: Soft, non-tender, non-distended, bowel sounds present  EXTREMITIES: no clubbing or cyanosis  SKIN: Warm, dry.     NEURO: alert and oriented, grossly without focal deficits   PSYCHIATRIC: Cooperative    LABS AND IMAGING:     Recent Labs     05/11/25  1836   WBC 10.26   RBC 4.23*   HGB 13.6*   HCT 41.5*   MCV 98.1*   MCH 32.2*   MCHC 32.8*   RDW 14.4        No results for input(s): "LACTIC" in the last 72 hours.  No results for input(s): "INR", "APTT", "D-DIMER" in the last 72 hours.  No results for input(s): "HGBA1C", "CHOL", "TRIG", "LDL", "VLDL", "HDL" in the last 72 hours.   Recent Labs     05/11/25  1836      K 4.1   CO2 26   BUN 12.1   CREATININE 0.94   CALCIUM 9.3   ALBUMIN 3.4   GLOBULIN 4.8*   ALKPHOS 86   ALT 20   AST 34   BILITOT 0.5     No results for input(s): "BNP", "CPK", "TROPONINI" in the last 72 hours.       X-Ray Foot Complete Left  Narrative: EXAMINATION:  Left foot three views    CLINICAL HISTORY:  Wound    COMPARISON:  None    FINDINGS:  There is no acute fracture subluxation seen.  There are postop changes from amputation of the great toe.  There is chronic excrescence from the distal tip of the 2nd digit.  There is no erosion or osseous destruction seen.  Moderate degenerative changes noted at the dorsal talonavicular joint.  There is soft tissue swelling.  No radiopaque foreign body.  There is question ulceration of lung the plantar aspect of the foot  Impression: No acute osseous finding.    Electronically signed by: Kaiser Ambrocio MD  Date:    05/11/2025  Time:    16:57      ASSESSMENT & PLAN:   Infected chronic left heel wound with left lower extremity cellulitis  Sciatic nerve injury with significant left lower extremity neuropathy    Hx: prediabetes, HTN, BECK    -blood cultures and continue broad-spectrum empiric antibiotics   -consultation to patient's podiatrist  -resume home meds as appropriate pending med " rec    DVT prophylaxis:  Lovenox  Code status:  Full code    If patient was admitted under observational status it is with my approval/permission.     At least 55 min was spent on this history and physical.  Time seen:  11:00 p.m. 5/11  Oren Morgan MD

## 2025-05-12 NOTE — PLAN OF CARE
05/12/25 1542   Discharge Assessment   Assessment Type Discharge Planning Assessment   Confirmed/corrected address, phone number and insurance   (Homeless)   Source of Information patient   Communicated GAMA with patient/caregiver Date not available/Unable to determine   Reason For Admission Wound to left foot   Do you expect to return to your current living situation? Other (see comments)  (Yes plans to continue living out of car)   Do you have help at home or someone to help you manage your care at home? No   Prior to hospitilization cognitive status: Unable to Assess   Current cognitive status: Alert/Oriented   Walking or Climbing Stairs Difficulty yes   Walking or Climbing Stairs ambulation difficulty, requires equipment   Mobility Management rw   Dressing/Bathing Difficulty no   Equipment Currently Used at Home walker, rolling   Do you currently have service(s) that help you manage your care at home? No   Do you have prescription coverage? Yes   Coverage [employer]   Who is going to help you get home at discharge? self   How do you get to doctors appointments? car, drives self   Are you on dialysis? No   Do you take coumadin? No   Discharge Plan A Shelter   Discharge Plan B Shelter   DME Needed Upon Discharge  none   Discharge Plan discussed with: Patient   Transition of Care Barriers Homeless     Patient lives out of car, received shelter resources from last hospital admit. Patient is refusing shelter, states he feels unsafe. He is in communication with his  who is assisting with shelter.     PCP Jian Walter MD  Pharmacy [facility]

## 2025-05-13 LAB
ALBUMIN SERPL-MCNC: 2.9 G/DL (ref 3.4–4.8)
ALBUMIN/GLOB SERPL: 0.7 RATIO (ref 1.1–2)
ALP SERPL-CCNC: 68 UNIT/L (ref 40–150)
ALT SERPL-CCNC: 15 UNIT/L (ref 0–55)
ANION GAP SERPL CALC-SCNC: 12 MEQ/L
AST SERPL-CCNC: 20 UNIT/L (ref 11–45)
BILIRUB SERPL-MCNC: 0.6 MG/DL
BUN SERPL-MCNC: 14.2 MG/DL (ref 8.4–25.7)
CALCIUM SERPL-MCNC: 9 MG/DL (ref 8.8–10)
CHLORIDE SERPL-SCNC: 99 MMOL/L (ref 98–107)
CO2 SERPL-SCNC: 27 MMOL/L (ref 23–31)
CREAT SERPL-MCNC: 0.99 MG/DL (ref 0.72–1.25)
CREAT/UREA NIT SERPL: 14
ERYTHROCYTE [DISTWIDTH] IN BLOOD BY AUTOMATED COUNT: 14.3 % (ref 11.5–17)
GFR SERPLBLD CREATININE-BSD FMLA CKD-EPI: >60 ML/MIN/1.73/M2
GLOBULIN SER-MCNC: 3.9 GM/DL (ref 2.4–3.5)
GLUCOSE SERPL-MCNC: 120 MG/DL (ref 82–115)
HCT VFR BLD AUTO: 36.4 % (ref 42–52)
HGB BLD-MCNC: 12.1 G/DL (ref 14–18)
INR PPP: 1.1
LEFT ABI: 1.01
LEFT ARM BP: 137 MMHG
LEFT CFA PSV: 100 CM/S
LEFT DORSALIS PEDIS PSV: 108 CM/S
LEFT DORSALIS PEDIS: 144 MMHG
LEFT POPLITEAL PSV: 156 CM/S
LEFT POST TIBIAL SYS PSV: 95 CM/S
LEFT POSTERIOR TIBIAL: 140 MMHG
LEFT PROFUNDA SYS PSV: 100 CM/S
LEFT SUPER FEMORAL DIST SYS PSV: 155 CM/S
LEFT SUPER FEMORAL MID SYS PSV: 134 CM/S
LEFT SUPER FEMORAL PROX SYS PSV: 120 CM/S
MAGNESIUM SERPL-MCNC: 2 MG/DL (ref 1.6–2.6)
MCH RBC QN AUTO: 31.7 PG (ref 27–31)
MCHC RBC AUTO-ENTMCNC: 33.2 G/DL (ref 33–36)
MCV RBC AUTO: 95.3 FL (ref 80–94)
NRBC BLD AUTO-RTO: 0 %
OHS CV LEFT LOWER EXTREMITY ABI (NO CALC): 1
OHS CV RIGHT ABI LOWER EXTREMITY (NO CALC): 1
PLATELET # BLD AUTO: 299 X10(3)/MCL (ref 130–400)
PMV BLD AUTO: 8.7 FL (ref 7.4–10.4)
POTASSIUM SERPL-SCNC: 4.2 MMOL/L (ref 3.5–5.1)
PROT SERPL-MCNC: 6.8 GM/DL (ref 5.8–7.6)
PROTHROMBIN TIME: 14 SECONDS (ref 12.5–14.5)
RBC # BLD AUTO: 3.82 X10(6)/MCL (ref 4.7–6.1)
RIGHT ABI: 1.04
RIGHT ARM BP: 142 MMHG
RIGHT CFA PSV: 100 CM/S
RIGHT DORSALIS PEDIS PSV: 88 CM/S
RIGHT DORSALIS PEDIS: 147 MMHG
RIGHT POPLITEAL PSV: 74 CM/S
RIGHT POST TIBIAL SYS PSV: 78 CM/S
RIGHT POSTERIOR TIBIAL: 140 MMHG
RIGHT PROFUNDA SYS PSV: 46 CM/S
RIGHT SUPER FEMORAL DIST SYS PSV: 90 CM/S
RIGHT SUPER FEMORAL MID SYS PSV: 122 CM/S
RIGHT SUPER FEMORAL PROX SYS PSV: 95 CM/S
SODIUM SERPL-SCNC: 138 MMOL/L (ref 136–145)
VANCOMYCIN TROUGH SERPL-MCNC: 13.6 UG/ML (ref 15–20)
WBC # BLD AUTO: 7.47 X10(3)/MCL (ref 4.5–11.5)

## 2025-05-13 PROCEDURE — 80053 COMPREHEN METABOLIC PANEL: CPT | Performed by: INTERNAL MEDICINE

## 2025-05-13 PROCEDURE — 36415 COLL VENOUS BLD VENIPUNCTURE: CPT | Performed by: INTERNAL MEDICINE

## 2025-05-13 PROCEDURE — 25000003 PHARM REV CODE 250: Performed by: INTERNAL MEDICINE

## 2025-05-13 PROCEDURE — 63600175 PHARM REV CODE 636 W HCPCS: Performed by: NURSE PRACTITIONER

## 2025-05-13 PROCEDURE — 63600175 PHARM REV CODE 636 W HCPCS: Performed by: INTERNAL MEDICINE

## 2025-05-13 PROCEDURE — 85027 COMPLETE CBC AUTOMATED: CPT | Performed by: INTERNAL MEDICINE

## 2025-05-13 PROCEDURE — 80202 ASSAY OF VANCOMYCIN: CPT | Performed by: INTERNAL MEDICINE

## 2025-05-13 PROCEDURE — 85610 PROTHROMBIN TIME: CPT | Performed by: INTERNAL MEDICINE

## 2025-05-13 PROCEDURE — 83735 ASSAY OF MAGNESIUM: CPT | Performed by: INTERNAL MEDICINE

## 2025-05-13 PROCEDURE — 25000003 PHARM REV CODE 250: Performed by: NURSE PRACTITIONER

## 2025-05-13 PROCEDURE — 11000001 HC ACUTE MED/SURG PRIVATE ROOM

## 2025-05-13 RX ADMIN — PIPERACILLIN SODIUM AND TAZOBACTAM SODIUM 4.5 G: 4; .5 INJECTION, POWDER, LYOPHILIZED, FOR SOLUTION INTRAVENOUS at 04:05

## 2025-05-13 RX ADMIN — HYDROCODONE BITARTRATE AND ACETAMINOPHEN 1 TABLET: 7.5; 325 TABLET ORAL at 12:05

## 2025-05-13 RX ADMIN — ENOXAPARIN SODIUM 40 MG: 40 INJECTION SUBCUTANEOUS at 05:05

## 2025-05-13 RX ADMIN — HYDROCODONE BITARTRATE AND ACETAMINOPHEN 1 TABLET: 7.5; 325 TABLET ORAL at 05:05

## 2025-05-13 RX ADMIN — VANCOMYCIN HYDROCHLORIDE 1500 MG: 1.5 INJECTION, POWDER, LYOPHILIZED, FOR SOLUTION INTRAVENOUS at 11:05

## 2025-05-13 RX ADMIN — AMLODIPINE BESYLATE 10 MG: 5 TABLET ORAL at 09:05

## 2025-05-13 RX ADMIN — PIPERACILLIN SODIUM AND TAZOBACTAM SODIUM 4.5 G: 4; .5 INJECTION, POWDER, LYOPHILIZED, FOR SOLUTION INTRAVENOUS at 02:05

## 2025-05-13 RX ADMIN — PIPERACILLIN SODIUM AND TAZOBACTAM SODIUM 4.5 G: 4; .5 INJECTION, POWDER, LYOPHILIZED, FOR SOLUTION INTRAVENOUS at 09:05

## 2025-05-13 NOTE — CONSULTS
[facility]                       [address] [place]                      [place] [address]    PATIENT NAME:       JULISSA FRIAS   YOB: 1965  CSN:                [id number]   MRN:                [id number]  ADMIT DATE:         05/11/2025 16:24:00  PHYSICIAN:          Get Enriquez DPM                            CONSULTATION    DATE OF CONSULT:  05/12/2025 00:00:00    HISTORY OF PRESENT ILLNESS:  Mr. Frias is a 59-year-old gentleman, well known   to me, from recurrent wounds on his left heel.  He again continues to be   homeless, living out of his car.  He had been admitted to the hospital about 2   months ago after being treated for similar problem.  Upon discharge, he was set   up with Wound Care.  Unsure if he actually went, but did follow up with I guess   his primary care physician and possibly Wound Care at [facility].  He has not been   consistent with staying off his foot.  He started noticing some foul smelling   drainage from the heel a day or 2 ago.  He had been changing his dressings every   other day, stating that the Wound Care folks told him to do such.  No other   issues.  He has chronic swelling in his leg and had an ultrasound done, which   was negative.  No other issues.  I was contacted about him earlier today and I   told him to obtain cultures of the wound this evening.    PAST MEDICAL HISTORY:  Notable for neuropathy, drop foot, hypertension.    PAST SURGICAL HISTORY:  Wound debridements, great toe amputation, back surgery,   ORIF of femur fracture, hip replacement, sciatic nerve repair, colostomy.    MEDICATIONS:  As per the chart.    ALLERGIES:  NO KNOWN FOOD ALLERGIES.     SOCIAL HISTORY:  Reformed smoker.  Occasional alcohol.  No IV drug abuse.    Again, he is homeless, living out of his car.  He does have a job as an   .    PHYSICAL EXAMINATION:  GENERAL:  Reveals a well-nourished gentleman, currently lying  in bed, does not   appear to be in distress.    VITAL SIGNS: His current vital signs are stable and he is afebrile.  HEART:  Deferred.  LUNG:  Deferred.  ABDOMEN:  Deferred.  EXTREMITIES:  Vascular:  He has generalized edema to that left lower extremity   versus the right.  Some erythema to the leg and warmth.  No Homans.  NEUROLOGIC:  :  He has definitely had substantial loss of light touch on   monofilament testing.  Reflexes deferred.  MUSCULOSKELETAL:  Noted drop foot on the left lower extremity.  He is missing   the left great toe.  He has a contracture of the remaining digits, loss of   anterior compartment function.  DERM:  He has open wound to the left heel with secondary hemorrhagic blister   formation circumferential.  This area along the posterior aspect appears to be   related to pressure.  He has one central escharotic area within the wound that   is soft.  There is no bone exposure.  Serous exudate, but no odor.  Some   inflammatory changes.  No fluctuance or crepitation.    IMPRESSION:  Recurrent left heel wound with secondary infection and cellulitis.    PLAN:  We are going to order wound care.  Cultures have been obtained.  Replace   dressings tonight.  Strict offloading while in bed.  This was reiterated   multiple times and as I have told him before, he needs to be nonweightbearing on   this foot.  We will continue to follow while here.        ______________________________  DANIEL Monroe/TERRI  DD:  05/12/2025  Time:  07:29PM  DT:  05/12/2025  Time:  09:09PM  Job #:  [id number]      CONSULTATION

## 2025-05-13 NOTE — PROGRESS NOTES
Pharmacokinetic Assessment Follow Up: IV Vancomycin    Vancomycin serum concentration assessment(s):    The trough level was drawn correctly and can be used to guide therapy at this time. The measurement is within the desired definitive target range of 10 to 20 mcg/mL.    Vancomycin Regimen Plan:    Continue regimen to Vancomycin 1500 mg IV every 12 hours with next serum trough concentration measured at 1000 prior to   dose on 05/15.    Drug levels (last 3 results):  Recent Labs   Lab Result Units 05/13/25  0831   Vancomycin Trough ug/ml 13.6*       Pharmacy will continue to follow and monitor vancomycin.    Please contact pharmacy at extension [phone] for questions regarding this assessment.    Thank you for the consult,   Kaley Kumar       Patient brief summary:  Kamaljit Frias is a 59 y.o. male initiated on antimicrobial therapy with IV Vancomycin for treatment of skin & soft tissue infection    The patient's current regimen is 1500mg q12h.    Drug Allergies:   Review of patient's allergies indicates:  No Known Allergies    Actual Body Weight:   118.6kg    Renal Function:   Estimated Creatinine Clearance: 105.7 mL/min (based on SCr of 0.99 mg/dL).,     Dialysis Method (if applicable):  N/A    CBC (last 72 hours):  Recent Labs   Lab Result Units 05/11/25  1836 05/12/25  0411 05/13/25  0437   WBC x10(3)/mcL 10.26 7.61 7.47   Hgb g/dL 13.6* 11.5* 12.1*   Hct % 41.5* 35.1* 36.4*   Platelet x10(3)/mcL 356 303 299   Mono % % 9.0 10.8  --    Eos % % 3.3 3.9  --    Basophil % % 0.5 0.7  --        Metabolic Panel (last 72 hours):  Recent Labs   Lab Result Units 05/11/25  1836 05/12/25  0411 05/13/25  0437   Sodium mmol/L 138 139 138   Potassium mmol/L 4.1 3.9 4.2   Chloride mmol/L 97* 101 99   CO2 mmol/L 26 26 27   Glucose mg/dL 115 110 120*   Blood Urea Nitrogen mg/dL 12.1 12.9 14.2   Creatinine mg/dL 0.94 0.91 0.99   Albumin g/dL 3.4 2.8* 2.9*   Bilirubin Total mg/dL 0.5 0.6 0.6   ALP unit/L 86 65 68   AST unit/L 34 19 20    ALT unit/L 20 14 15   Magnesium Level mg/dL  --   --  2.00       Vancomycin Administrations:  vancomycin given in the last 96 hours                     vancomycin 1,500 mg in D5W 250 mL IVPB (admixture device) (mg) 1,500 mg New Bag 05/13/25 1106     1,500 mg New Bag 05/12/25 2137     1,500 mg New Bag  0949    vancomycin (VANCOCIN) 1,500 mg in D5W 250 mL IVPB (admixture device) (mg) 1,500 mg New Bag 05/11/25 2201                    Microbiologic Results:  Microbiology Results (last 7 days)       Procedure Component Value Units Date/Time    Tissue Culture - Aerobic [[id number]]  (Abnormal) Collected: 05/12/25 1407    Order Status: Completed Specimen: Tissue from Foot, Left Updated: 05/13/25 0906     Tissue - Aerobic Culture Moderate Gram-positive Cocci     Comment: with normal skin missy       Blood culture #1 **CANNOT BE ORDERED STAT** [[id number]]  (Normal) Collected: 05/11/25 2041    Order Status: Completed Specimen: Blood Updated: 05/12/25 2300     Blood Culture No Growth At 24 Hours    Blood culture #2 **CANNOT BE ORDERED STAT** [[id number]]  (Normal) Collected: 05/11/25 2041    Order Status: Completed Specimen: Blood Updated: 05/12/25 2300     Blood Culture No Growth At 24 Hours    Anaerobic Culture [[id number]] Collected: 05/12/25 1407    Order Status: Sent Specimen: Wound from Foot, Left Updated: 05/12/25 1409

## 2025-05-13 NOTE — PROGRESS NOTES
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    Present to the ED on 05/11/2025 with chief complaint of several days of foul-smelling purulent drainage from his left heel wound.  Denies any fever or chills.  Report significant bilateral lower extremity swelling that has been increasing over the past 2 months.  Denies dyspnea, orthopnea or PNDs.    Upon arrival to ED was afebrile and hemodynamically stable, labs notable for no leukocytosis or left shift, ESR 97 and CRP 47.  BLE venous ultrasound showed no evidence of venous thrombosis.    He was blood cultured and started IV antibiotics, and referred to hospital medicine service and podiatry consulted..    ROS: Except as documented, all systems reviewed and negative.    Interval History:  No overnight event remained afebrile and hemodynamically stable, CBC and CMP this morning without interval change, BNP normal..      Report significant improvement in bilateral leg swelling with 1 dose of IV Lasix yesterday, also the erythema extending up in the leg has markedly retracted down to the ankle,     Echo was done and show normal LVEF at 60-65% but noted for diastolic dysfunction present otherwise eCVP is normal at 3 and ePASP at 24 mm Hg, no significant valvular abnormalities.  Bilateral arterial ultrasound show patent arterial system as well as normal SANA at 1.0.    Evaluated by Podiatry and no plan for  surgical debridement, continued IV antibiotic and wound care.     Objective   Physical exam:  Vital Signs  Temp:  [98.1 °F (36.7 °C)-98.4 °F (36.9 °C)]   Pulse:  [90-98]   Resp:  [20]   BP: (126-130)/(82-87)   SpO2:  [94 %-95 %]    General: Appears comfortable  HEENT: NC/AT  Neck:  No JVD  Chest: CTABL  CVS: Regular rhythm. Normal S1/S2.  Abdomen: nondistended, normoactive BS, soft and non-tender.  MSK: No obvious deformity or joint swelling  Skin:  Erythema extending from the left  foot to circumferentially just below the knee      Neuro: AAOx3  Psych: Cooperative    Labs & diagnostics  Recent Labs     05/12/25 0411 05/13/25 0437   WBC 7.61 7.47   HGB 11.5* 12.1*   HCT 35.1* 36.4*    299      Recent Labs     05/12/25 0411 05/13/25 0437    138   K 3.9 4.2    99   CO2 26 27   BUN 12.9 14.2   CREATININE 0.91 0.99   EGFRNORACEVR >60 >60   CALCIUM 8.8 9.0   MG  --  2.00   ALBUMIN 2.8* 2.9*   GLOBULIN 3.7* 3.9*   ALKPHOS 65 68   ALT 14 15   AST 19 20   BILITOT 0.6 0.6        Microbiology Results (last 7 days)       Procedure Component Value Units Date/Time    Blood culture #1 **CANNOT BE ORDERED STAT** [[id number]]  (Normal) Collected: 05/11/25 2041    Order Status: Completed Specimen: Blood Updated: 05/13/25 2300     Blood Culture No Growth At 48 Hours    Blood culture #2 **CANNOT BE ORDERED STAT** [[id number]]  (Normal) Collected: 05/11/25 2041    Order Status: Completed Specimen: Blood Updated: 05/13/25 2300     Blood Culture No Growth At 48 Hours    Tissue Culture - Aerobic [[id number]]  (Abnormal) Collected: 05/12/25 1407    Order Status: Completed Specimen: Tissue from Foot, Left Updated: 05/13/25 0906     Tissue - Aerobic Culture Moderate Gram-positive Cocci     Comment: with normal skin missy       Anaerobic Culture [[id number]] Collected: 05/12/25 1407    Order Status: Sent Specimen: Wound from Foot, Left Updated: 05/12/25 1408           Ankle Brachial Indices (SANA)  The right lower  extremity ankle brachial index is 1.0  suggesting no   significant arterial obstructive disease.    The left lower extremity ankle brachial index is 1.0 suggesting no   significant arterial obstructive disease.    CV Ultrasound doppler arterial legs bilat  The right lower extremity arterial system is patent with no evidence of   focal stenosis or occlusion.  The left lower extremity arterial system is patent with no evidence of   focal stenosis or occlusion. Monophasic waveforms are identified from the   proximal femoral artery through the tibial arteries.    RT SANA = 1.0  LT SANA = 1.0      Inpatient Medications  Scheduled Med:   amLODIPine  10 mg Oral Daily    enoxparin  40 mg Subcutaneous Daily    piperacillin-tazobactam (Zosyn) IV (PEDS and ADULTS) (extended infusion is not appropriate)  4.5 g Intravenous Q8H    vancomycin 1,500 mg in D5W 250 mL IVPB (admixture device)  1,500 mg Intravenous Q12H      Continuous Infusions:     PRN Meds:    Current Facility-Administered Medications:     acetaminophen, 650 mg, Oral, Q8H PRN    HYDROcodone-acetaminophen, 1 tablet, Oral, Q6H PRN    ketorolac, 15 mg, Intravenous, Q6H PRN    melatonin, 6 mg, Oral, Nightly PRN    ondansetron, 4 mg, Intravenous, Q8H PRN    sodium chloride 0.9%, 10 mL, Intravenous, PRN    vancomycin - pharmacy to dose, , Intravenous, pharmacy to manage frequency     Assessment:   Infected chronic left heel ulcer with left foot and leg cellulitis  Wound swab culture 05/12/2025:  Moderate Gram-positive cocci  Chronic BLE edema likely venous stasis possible lymphedema    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    Anticipate transitioning to Augmentin and doxycycline possibly tomorrow and will plan for 10-14 days' course  Appreciate podiatry assessment and recommendations  Will give another Lasix 40 mg IV tomorrow to aid in BLE  edema and wound healing.  He is currently homeless, his sister today present at bedside and report she will offer him a place to stay in her house at least until his wound completely heal    VTE Prophylaxis:-enoxaparin 40 mg subQ daily  Patient condition:-stable  Disposition: TBD      Ilsa Hernandez MD  Internal Medicine

## 2025-05-14 LAB
CREAT SERPL-MCNC: 1.1 MG/DL (ref 0.72–1.25)
CRP SERPL-MCNC: 39.2 MG/L
ERYTHROCYTE [SEDIMENTATION RATE] IN BLOOD: 78 MM/HR (ref 0–20)
GFR SERPLBLD CREATININE-BSD FMLA CKD-EPI: >60 ML/MIN/1.73/M2

## 2025-05-14 PROCEDURE — 63600175 PHARM REV CODE 636 W HCPCS: Performed by: INTERNAL MEDICINE

## 2025-05-14 PROCEDURE — 25000003 PHARM REV CODE 250: Performed by: INTERNAL MEDICINE

## 2025-05-14 PROCEDURE — 85652 RBC SED RATE AUTOMATED: CPT | Performed by: INTERNAL MEDICINE

## 2025-05-14 PROCEDURE — 36415 COLL VENOUS BLD VENIPUNCTURE: CPT | Performed by: INTERNAL MEDICINE

## 2025-05-14 PROCEDURE — 25000003 PHARM REV CODE 250: Performed by: NURSE PRACTITIONER

## 2025-05-14 PROCEDURE — 63600175 PHARM REV CODE 636 W HCPCS: Performed by: NURSE PRACTITIONER

## 2025-05-14 PROCEDURE — 82565 ASSAY OF CREATININE: CPT | Performed by: INTERNAL MEDICINE

## 2025-05-14 PROCEDURE — 86140 C-REACTIVE PROTEIN: CPT | Performed by: INTERNAL MEDICINE

## 2025-05-14 PROCEDURE — 11000001 HC ACUTE MED/SURG PRIVATE ROOM

## 2025-05-14 RX ORDER — FUROSEMIDE 10 MG/ML
40 INJECTION INTRAMUSCULAR; INTRAVENOUS ONCE
Status: COMPLETED | OUTPATIENT
Start: 2025-05-14 | End: 2025-05-14

## 2025-05-14 RX ORDER — DOCUSATE SODIUM 100 MG/1
100 CAPSULE, LIQUID FILLED ORAL 2 TIMES DAILY
Status: DISCONTINUED | OUTPATIENT
Start: 2025-05-14 | End: 2025-05-17 | Stop reason: HOSPADM

## 2025-05-14 RX ORDER — POLYETHYLENE GLYCOL 3350 17 G/17G
17 POWDER, FOR SOLUTION ORAL DAILY
Status: DISCONTINUED | OUTPATIENT
Start: 2025-05-14 | End: 2025-05-17 | Stop reason: HOSPADM

## 2025-05-14 RX ADMIN — HYDROCODONE BITARTRATE AND ACETAMINOPHEN 1 TABLET: 7.5; 325 TABLET ORAL at 12:05

## 2025-05-14 RX ADMIN — FUROSEMIDE 40 MG: 10 INJECTION, SOLUTION INTRAVENOUS at 08:05

## 2025-05-14 RX ADMIN — PIPERACILLIN SODIUM AND TAZOBACTAM SODIUM 4.5 G: 4; .5 INJECTION, POWDER, LYOPHILIZED, FOR SOLUTION INTRAVENOUS at 08:05

## 2025-05-14 RX ADMIN — POLYETHYLENE GLYCOL 3350 17 G: 17 POWDER, FOR SOLUTION ORAL at 09:05

## 2025-05-14 RX ADMIN — VANCOMYCIN HYDROCHLORIDE 1500 MG: 1.5 INJECTION, POWDER, LYOPHILIZED, FOR SOLUTION INTRAVENOUS at 11:05

## 2025-05-14 RX ADMIN — PIPERACILLIN SODIUM AND TAZOBACTAM SODIUM 4.5 G: 4; .5 INJECTION, POWDER, LYOPHILIZED, FOR SOLUTION INTRAVENOUS at 05:05

## 2025-05-14 RX ADMIN — DOCUSATE SODIUM 100 MG: 100 CAPSULE, LIQUID FILLED ORAL at 08:05

## 2025-05-14 RX ADMIN — VANCOMYCIN HYDROCHLORIDE 1500 MG: 1.5 INJECTION, POWDER, LYOPHILIZED, FOR SOLUTION INTRAVENOUS at 10:05

## 2025-05-14 RX ADMIN — PIPERACILLIN SODIUM AND TAZOBACTAM SODIUM 4.5 G: 4; .5 INJECTION, POWDER, LYOPHILIZED, FOR SOLUTION INTRAVENOUS at 01:05

## 2025-05-14 RX ADMIN — AMLODIPINE BESYLATE 10 MG: 5 TABLET ORAL at 08:05

## 2025-05-14 RX ADMIN — ENOXAPARIN SODIUM 40 MG: 40 INJECTION SUBCUTANEOUS at 05:05

## 2025-05-14 RX ADMIN — HYDROCODONE BITARTRATE AND ACETAMINOPHEN 1 TABLET: 7.5; 325 TABLET ORAL at 06:05

## 2025-05-14 RX ADMIN — DOCUSATE SODIUM 100 MG: 100 CAPSULE, LIQUID FILLED ORAL at 09:05

## 2025-05-14 NOTE — PROGRESS NOTES
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    Present to the ED on 05/11/2025 with chief complaint of several days of foul-smelling purulent drainage from his left heel wound.  Denies any fever or chills.  Report significant bilateral lower extremity swelling that has been increasing over the past 2 months.  Denies dyspnea, orthopnea or PNDs.    Upon arrival to ED was afebrile and hemodynamically stable, labs notable for no leukocytosis or left shift, ESR 97 and CRP 47.  LLE venous ultrasound showed no evidence of venous thrombosis.    He was blood cultured and started IV vancomycin and Zosyn, and referred to hospital medicine service and podiatry consulted. Report significant improvement in bilateral leg swelling with 1 dose of IV Lasix , also the erythema extending up in the leg has markedly retracted down to the ankle, Echo was done and show normal LVEF at 60-65% but noted for diastolic dysfunction present otherwise eCVP is normal at 3 and ePASP at 24 mm Hg, no significant valvular abnormalities.  Bilateral arterial ultrasound show patent arterial system as well as normal SANA at 1.0. Evaluated by Podiatry and no plan for surgical debridement, continued IV antibiotic and wound care.     ROS: Except as documented, all systems reviewed and negative.    Interval History:  No overnight events, left lower extremity swelling and erythema improving as well  as left left heel wound.  Wound culture further speciated to moderate Staphylococcus aureus and Bordetella trematum, sensitivities pending.    Objective   Physical exam:  Vital Signs  Temp:  [98.1 °F (36.7 °C)-98.4 °F (36.9 °C)]   Pulse:  [90-98]   Resp:  [20]   BP: (126-130)/(82-87)   SpO2:  [94 %-95 %]    General: Appears comfortable  HEENT: NC/AT  Neck:  No JVD  Chest: CTABL  CVS: Regular rhythm. Normal S1/S2.  Abdomen: nondistended, normoactive BS, soft and non-tender.  MSK: No obvious deformity or joint swelling  Skin:  Erythema extending from the left  foot to circumferentially just below the knee      Neuro: AAOx3  Psych: Cooperative    Labs & diagnostics  Recent Labs     05/12/25 0411 05/13/25 0437   WBC 7.61 7.47   HGB 11.5* 12.1*   HCT 35.1* 36.4*    299      Recent Labs     05/12/25 0411 05/13/25 0437    138   K 3.9 4.2    99   CO2 26 27   BUN 12.9 14.2   CREATININE 0.91 0.99   EGFRNORACEVR >60 >60   CALCIUM 8.8 9.0   MG  --  2.00   ALBUMIN 2.8* 2.9*   GLOBULIN 3.7* 3.9*   ALKPHOS 65 68   ALT 14 15   AST 19 20   BILITOT 0.6 0.6        Microbiology Results (last 7 days)       Procedure Component Value Units Date/Time    Blood culture #1 **CANNOT BE ORDERED STAT** [[id number]]  (Normal) Collected: 05/11/25 2041    Order Status: Completed Specimen: Blood Updated: 05/13/25 2300     Blood Culture No Growth At 48 Hours    Blood culture #2 **CANNOT BE ORDERED STAT** [[id number]]  (Normal) Collected: 05/11/25 2041    Order Status: Completed Specimen: Blood Updated: 05/13/25 2300     Blood Culture No Growth At 48 Hours    Tissue Culture - Aerobic [[id number]]  (Abnormal) Collected: 05/12/25 1407    Order Status: Completed Specimen: Tissue from Foot, Left Updated: 05/13/25 0906     Tissue - Aerobic Culture Moderate Gram-positive Cocci     Comment: with normal skin missy       Anaerobic Culture [[id number]] Collected: 05/12/25 1407    Order Status: Sent Specimen: Wound from Foot, Left Updated:  05/12/25 1409           Ankle Brachial Indices (SANA)  The right lower extremity ankle brachial index is 1.0  suggesting no   significant arterial obstructive disease.    The left lower extremity ankle brachial index is 1.0 suggesting no   significant arterial obstructive disease.    CV Ultrasound doppler arterial legs bilat  The right lower extremity arterial system is patent with no evidence of   focal stenosis or occlusion.  The left lower extremity arterial system is patent with no evidence of   focal stenosis or occlusion. Monophasic waveforms are identified from the   proximal femoral artery through the tibial arteries.    RT SANA = 1.0  LT SANA = 1.0      Inpatient Medications  Scheduled Med:   amLODIPine  10 mg Oral Daily    enoxparin  40 mg Subcutaneous Daily    piperacillin-tazobactam (Zosyn) IV (PEDS and ADULTS) (extended infusion is not appropriate)  4.5 g Intravenous Q8H    vancomycin 1,500 mg in D5W 250 mL IVPB (admixture device)  1,500 mg Intravenous Q12H      Continuous Infusions:     PRN Meds:    Current Facility-Administered Medications:     acetaminophen, 650 mg, Oral, Q8H PRN    HYDROcodone-acetaminophen, 1 tablet, Oral, Q6H PRN    ketorolac, 15 mg, Intravenous, Q6H PRN    melatonin, 6 mg, Oral, Nightly PRN    ondansetron, 4 mg, Intravenous, Q8H PRN    sodium chloride 0.9%, 10 mL, Intravenous, PRN    vancomycin - pharmacy to dose, , Intravenous, pharmacy to manage frequency     Assessment:   Infected chronic left heel ulcer with left foot and leg cellulitis  Wound swab culture 05/12/2025:  Staphylococcus aureus and Bordetella trematum-sensitivities pending    Chronic BLE edema likely venous stasis possible lymphedema    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  He is currently homeless, his sister today present at bedside and report she will offer him a place to stay in her house at least until his wound completely heal    VTE Prophylaxis:-enoxaparin 40 mg subQ daily  Patient condition:-stable  Disposition: TBD      Ilsa Hernandez MD  Internal Medicine

## 2025-05-14 NOTE — PROGRESS NOTES
[facility]                       [address] [address]    PATIENT NAME:       JULISSA SCOTT   YOB: 1965  CSN:                [id number]   MRN:                [id number]  ADMIT DATE:         05/11/2025 16:24:00  PHYSICIAN:          Get Enriquez DPM                            PROGRESS NOTE    DATE:  05/13/2025 00:00:00    SUBJECTIVE:  The patient was seen today, doing well.  No major issues reported.    Apparently, noncompliant with staying off the foot.  Claims that he is walking   on the forefoot.  I reiterated nonweightbearing.  His sister is present today.    He is on vancomycin and Zosyn.    PHYSICAL EXAMINATION:  Vital signs are stable, and he is afebrile.    LABORATORY DATA:  Labs reviewed.  White cell counts are 7, H and H 12 and 36.    BUN and creatinine 14 and 0.9.  Blood sugars low 100s.  Preliminary cultures are   growing out gram-positive cocci.  Anaerobes are pending.    The erythema to the leg is markedly improved, receded significantly away from   the previous marks.  Overall, edema is improved.  The wound is clean and viable   with more than 1 central fibrotic area.  Margins look better today.  There is   less exudate, still with a little bit of some darker tinge.  No bone exposed.    No fluctuance, crepitation, or bogginess to the tissues.    ASSESSMENT:  Recurrent left heel wound with underlying cellulitis.    PLAN:  Dressings were placed.  I reiterated nonweightbearing on the foot.    Continue with current antibiotics until cultures are back.        ______________________________  Get Enriquez DPM    GAS/AQS  DD:  05/13/2025  Time:  05:01PM  DT:  05/13/2025  Time:  07:13PM  Job #:  [id number]      PROGRESS NOTE

## 2025-05-15 LAB
ALBUMIN SERPL-MCNC: 3 G/DL (ref 3.4–4.8)
ALBUMIN/GLOB SERPL: 0.6 RATIO (ref 1.1–2)
ALP SERPL-CCNC: 61 UNIT/L (ref 40–150)
ALT SERPL-CCNC: 16 UNIT/L (ref 0–55)
ANION GAP SERPL CALC-SCNC: 10 MEQ/L
AST SERPL-CCNC: 18 UNIT/L (ref 11–45)
BACTERIA SPEC ANAEROBE CULT: NORMAL
BILIRUB SERPL-MCNC: 0.5 MG/DL
BUN SERPL-MCNC: 17.1 MG/DL (ref 8.4–25.7)
CALCIUM SERPL-MCNC: 9.5 MG/DL (ref 8.8–10)
CHLORIDE SERPL-SCNC: 99 MMOL/L (ref 98–107)
CO2 SERPL-SCNC: 26 MMOL/L (ref 23–31)
CREAT SERPL-MCNC: 1.18 MG/DL (ref 0.72–1.25)
CREAT/UREA NIT SERPL: 14
GFR SERPLBLD CREATININE-BSD FMLA CKD-EPI: >60 ML/MIN/1.73/M2
GLOBULIN SER-MCNC: 5.2 GM/DL (ref 2.4–3.5)
GLUCOSE SERPL-MCNC: 158 MG/DL (ref 82–115)
POTASSIUM SERPL-SCNC: 3.6 MMOL/L (ref 3.5–5.1)
PROT SERPL-MCNC: 8.2 GM/DL (ref 5.8–7.6)
SODIUM SERPL-SCNC: 135 MMOL/L (ref 136–145)
VANCOMYCIN TROUGH SERPL-MCNC: 21.4 UG/ML (ref 15–20)

## 2025-05-15 PROCEDURE — 25000003 PHARM REV CODE 250: Performed by: NURSE PRACTITIONER

## 2025-05-15 PROCEDURE — 25000003 PHARM REV CODE 250: Performed by: INTERNAL MEDICINE

## 2025-05-15 PROCEDURE — 94760 N-INVAS EAR/PLS OXIMETRY 1: CPT

## 2025-05-15 PROCEDURE — 63600175 PHARM REV CODE 636 W HCPCS: Performed by: INTERNAL MEDICINE

## 2025-05-15 PROCEDURE — 63600175 PHARM REV CODE 636 W HCPCS: Performed by: NURSE PRACTITIONER

## 2025-05-15 PROCEDURE — 11000001 HC ACUTE MED/SURG PRIVATE ROOM

## 2025-05-15 PROCEDURE — 80202 ASSAY OF VANCOMYCIN: CPT | Performed by: INTERNAL MEDICINE

## 2025-05-15 PROCEDURE — 36415 COLL VENOUS BLD VENIPUNCTURE: CPT | Performed by: INTERNAL MEDICINE

## 2025-05-15 PROCEDURE — 80053 COMPREHEN METABOLIC PANEL: CPT | Performed by: INTERNAL MEDICINE

## 2025-05-15 RX ORDER — MUPIROCIN 20 MG/G
OINTMENT TOPICAL DAILY
Status: DISCONTINUED | OUTPATIENT
Start: 2025-05-15 | End: 2025-05-17 | Stop reason: HOSPADM

## 2025-05-15 RX ADMIN — MUPIROCIN: 20 OINTMENT TOPICAL at 11:05

## 2025-05-15 RX ADMIN — PIPERACILLIN SODIUM AND TAZOBACTAM SODIUM 4.5 G: 4; .5 INJECTION, POWDER, LYOPHILIZED, FOR SOLUTION INTRAVENOUS at 08:05

## 2025-05-15 RX ADMIN — PIPERACILLIN SODIUM AND TAZOBACTAM SODIUM 4.5 G: 4; .5 INJECTION, POWDER, LYOPHILIZED, FOR SOLUTION INTRAVENOUS at 05:05

## 2025-05-15 RX ADMIN — PIPERACILLIN SODIUM AND TAZOBACTAM SODIUM 4.5 G: 4; .5 INJECTION, POWDER, LYOPHILIZED, FOR SOLUTION INTRAVENOUS at 12:05

## 2025-05-15 RX ADMIN — POLYETHYLENE GLYCOL 3350 17 G: 17 POWDER, FOR SOLUTION ORAL at 08:05

## 2025-05-15 RX ADMIN — HYDROCODONE BITARTRATE AND ACETAMINOPHEN 1 TABLET: 7.5; 325 TABLET ORAL at 06:05

## 2025-05-15 RX ADMIN — HYDROCODONE BITARTRATE AND ACETAMINOPHEN 1 TABLET: 7.5; 325 TABLET ORAL at 12:05

## 2025-05-15 RX ADMIN — ENOXAPARIN SODIUM 40 MG: 40 INJECTION SUBCUTANEOUS at 05:05

## 2025-05-15 RX ADMIN — DOCUSATE SODIUM 100 MG: 100 CAPSULE, LIQUID FILLED ORAL at 08:05

## 2025-05-15 RX ADMIN — AMLODIPINE BESYLATE 10 MG: 5 TABLET ORAL at 08:05

## 2025-05-15 RX ADMIN — DOCUSATE SODIUM 100 MG: 100 CAPSULE, LIQUID FILLED ORAL at 09:05

## 2025-05-15 RX ADMIN — VANCOMYCIN HYDROCHLORIDE 1250 MG: 1.25 INJECTION, POWDER, LYOPHILIZED, FOR SOLUTION INTRAVENOUS at 05:05

## 2025-05-15 NOTE — PROGRESS NOTES
Pharmacokinetic Assessment Follow Up: IV Vancomycin    Vancomycin serum concentration assessment(s):    The trough level was drawn correctly and can be used to guide therapy at this time. The measurement is below the desired definitive target range of 10 to 20 mcg/mL.    Vancomycin Regimen Plan:    Change regimen to Vancomycin 1250 mg IV every 12 hours to start at 1730 on 05/15 with next serum trough concentration measured at 0430 prior to 4tf dose on 05/17.    Drug levels (last 3 results):  Recent Labs   Lab Result Units 05/13/25  0831 05/15/25  0925   Vancomycin Trough ug/ml 13.6* 21.4*       Pharmacy will continue to follow and monitor vancomycin.    Please contact pharmacy at extension [phone] for questions regarding this assessment.    Thank you for the consult,   Kaley Kumar       Patient brief summary:  Kamaljit Frias is a 59 y.o. male initiated on antimicrobial therapy with IV Vancomycin for treatment of skin & soft tissue infection    The patient's current regimen is 1500mg q12h.    Drug Allergies:   Review of patient's allergies indicates:  No Known Allergies    Actual Body Weight:   118.6kg    Renal Function:   Estimated Creatinine Clearance: 88.7 mL/min (based on SCr of 1.18 mg/dL).,     Dialysis Method (if applicable):  N/A    CBC (last 72 hours):  Recent Labs   Lab Result Units 05/13/25  0437   WBC x10(3)/mcL 7.47   Hgb g/dL 12.1*   Hct % 36.4*   Platelet x10(3)/mcL 299       Metabolic Panel (last 72 hours):  Recent Labs   Lab Result Units 05/13/25  0437 05/14/25  0421 05/15/25  0343   Sodium mmol/L 138  --  135*   Potassium mmol/L 4.2  --  3.6   Chloride mmol/L 99  --  99   CO2 mmol/L 27  --  26   Glucose mg/dL 120*  --  158*   Blood Urea Nitrogen mg/dL 14.2  --  17.1   Creatinine mg/dL 0.99 1.10 1.18   Albumin g/dL 2.9*  --  3.0*   Bilirubin Total mg/dL 0.6  --  0.5   ALP unit/L 68  --  61   AST unit/L 20  --  18   ALT unit/L 15  --  16   Magnesium Level mg/dL 2.00  --   --        Vancomycin  Administrations:  vancomycin given in the last 96 hours                     vancomycin 1,500 mg in D5W 250 mL IVPB (admixture device) (mg) 1,500 mg New Bag 05/14/25 2247     1,500 mg New Bag  1138     1,500 mg New Bag 05/13/25 2357     1,500 mg New Bag  1106     1,500 mg New Bag 05/12/25 2137     1,500 mg New Bag  0949    vancomycin (VANCOCIN) 1,500 mg in D5W 250 mL IVPB (admixture device) (mg) 1,500 mg New Bag 05/11/25 2201                    Microbiologic Results:  Microbiology Results (last 7 days)       Procedure Component Value Units Date/Time    Anaerobic Culture [[id number]] Collected: 05/12/25 1407    Order Status: Completed Specimen: Wound from Foot, Left Updated: 05/15/25 0848     Anaerobe Culture No Anaerobes Isolated    Blood culture #1 **CANNOT BE ORDERED STAT** [[id number]]  (Normal) Collected: 05/11/25 2041    Order Status: Completed Specimen: Blood Updated: 05/14/25 2300     Blood Culture No Growth At 72 Hours    Blood culture #2 **CANNOT BE ORDERED STAT** [[id number]]  (Normal) Collected: 05/11/25 2041    Order Status: Completed Specimen: Blood Updated: 05/14/25 2300     Blood Culture No Growth At 72 Hours    Tissue Culture - Aerobic [[id number]]  (Abnormal) Collected: 05/12/25 1407    Order Status: Completed Specimen: Tissue from Foot, Left Updated: 05/14/25 1302     Tissue - Aerobic Culture Moderate Staphylococcus aureus      Few Bordetella trematum     Comment: Susceptibility sent to reference lab. Results to follow on separate report.

## 2025-05-15 NOTE — PLAN OF CARE
Currently on IV vanc and Zosyn. Patient is homeless and living out of his car. Sister spoke with hospitalist yesterday and agreed to provide housing for patient during recovery.

## 2025-05-15 NOTE — PROGRESS NOTES
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     Present to the ED on 05/11/2025 with chief complaint of several days of foul-smelling purulent drainage from his left heel wound.  Denies any fever or chills.  Report significant bilateral lower extremity swelling that has been increasing over the past 2 months.  Denies dyspnea, orthopnea or PNDs.     Upon arrival to ED was afebrile and hemodynamically stable, labs notable for no leukocytosis or left shift, ESR 97 and CRP 47.  LLE venous ultrasound showed no evidence of venous thrombosis.     He was blood cultured and started IV vancomycin and Zosyn, and referred to hospital medicine service and podiatry consulted. Report significant improvement in bilateral leg swelling with 1 dose of IV Lasix , also the erythema extending up in the leg has markedly retracted down to the ankle, Echo was done and show normal LVEF at 60-65% but noted for diastolic dysfunction present otherwise eCVP is normal at 3 and ePASP at 24 mm Hg, no significant valvular abnormalities.  Bilateral arterial ultrasound show patent arterial system as well as normal SANA at 1.0. Evaluated by Podiatry and no plan for surgical debridement, continued IV antibiotic and wound care  Wound culture is growing staph aureus and Bordetella we still awaiting final culture and sensitivity results  Interval Hx:   Patient seen and examined this morning has some redness and slight induration around the IV site  Case was discussed with patient's nurse and  on the floor.    Objective/physical exam:  General: In no acute distress, afebrile  Chest:  Clear to auscultation bilaterally  Heart: RRR, +S1, S2, no appreciable murmur  Abdomen: Soft, nontender, BS +  MSK: Warm, no lower extremity edema, no clubbing or cyanosis  Neurologic: Alert and oriented x4, Cranial nerve II-XII intact, Strength 5/5 in all 4 extremities  Skin: Slight induration and redness around the IV site  VITAL SIGNS: 24 HRS MIN & MAX LAST   Temp  Min: 98 °F (36.7 °C)  Max: 98.4 °F (36.9 °C) 98 °F (36.7 °C)   BP  Min: 117/77  Max: 141/88 (!) 141/88   Pulse  Min: 72  Max: 91  72   Resp  Min: 17  Max: 20 20   SpO2  Min: 94 %  Max: 97 % 95 %     I have reviewed the following labs:  Recent Labs   Lab 05/11/25  1836 05/12/25 0411 05/13/25  0437   WBC 10.26 7.61 7.47   RBC 4.23* 3.59* 3.82*   HGB 13.6* 11.5* 12.1*   HCT 41.5* 35.1* 36.4*   MCV 98.1* 97.8* 95.3*   MCH 32.2* 32.0* 31.7*   MCHC 32.8* 32.8* 33.2   RDW 14.4 14.4 14.3    303 299   MPV 8.5 8.6 8.7     Recent Labs   Lab 05/12/25  0411 05/13/25  0437 05/14/25  0421 05/15/25  0343    138  --  135*   K 3.9 4.2  --  3.6    99  --  99   CO2 26 27  --  26   BUN 12.9 14.2  --  17.1   CREATININE 0.91 0.99 1.10 1.18    120*  --  158*   CALCIUM 8.8 9.0  --  9.5   MG  --  2.00  --   --    ALBUMIN 2.8* 2.9*  --  3.0*   PROT 6.5 6.8  --  8.2*   ALKPHOS 65 68  --  61   ALT 14 15  --  16   AST 19 20  --  18   BILITOT 0.6 0.6  --  0.5     Microbiology Results (last 7 days)       Procedure Component Value Units Date/Time    Blood culture #1 **CANNOT BE ORDERED STAT** [[id number]]  (Normal) Collected: 05/11/25 2041    Order Status: Completed Specimen: Blood Updated: 05/14/25 2300     Blood Culture No Growth At 72 Hours    Blood culture #2 **CANNOT BE ORDERED STAT** [[id number]]  (Normal) Collected: 05/11/25 2041    Order Status: Completed Specimen: Blood Updated: 05/14/25 2300     Blood Culture No Growth At 72 Hours    Tissue Culture - Aerobic [[id number]]  (Abnormal) Collected: 05/12/25 1407    Order Status: Completed Specimen:  Tissue from Foot, Left Updated: 05/14/25 1302     Tissue - Aerobic Culture Moderate Staphylococcus aureus      Few Bordetella trematum     Comment: Susceptibility sent to reference lab. Results to follow on separate report.       Anaerobic Culture [[id number]] Collected: 05/12/25 1402    Order Status: Completed Specimen: Wound from Foot, Left Updated: 05/14/25 0940     Anaerobe Culture No Anaerobes Isolated             See below for Radiology    Assessment/Plan:  Infected left heel ulcer with left foot and leg cellulitis   Chronic bilateral lower extremity edema likely venous stasis and possible lymphedema   Mild cellulitis around the IV site  History of left sciatic nerve injury with chronic LLE neuropathy since teenager, HTN, BECK, prediabetes     Wound culture from the foot is growing staph and Bordetella we are awaiting final culture and sensitivity results continue with vancomycin and Zosyn   Patient has slight redness and slight induration around the IV site we will continue with IV vancomycin and Zosyn but I will add mupirocin and will do some warm compresses  Podiatry following   Reviewed lab work from this morning that looks stable   All vitals stable     Potential discharge once we have the final culture and sensitivity results  VTE prophylaxis:     Patient condition:  Stable/Fair/Guarded/ Serious/ Critical    Anticipated discharge and Disposition:         All diagnosis and differential diagnosis have been reviewed; assessment and plan has been documented; I have personally reviewed the labs and test results that are presently available; I have reviewed the patients medication list; I have reviewed the consulting providers response and recommendations. I have reviewed or attempted to review medical records based upon their availability    All of the patient's questions have been  addressed and answered. Patient's is agreeable to the above stated plan. I will continue to monitor closely and make adjustments  to medical management as needed.    Portions of this note dictated using EMR integrated voice recognition software, and may be subject to voice recognition errors not corrected at proofreading. Please contact writer for clarification if needed.   _____________________________________________________________________    Malnutrition Status:  Nutrition consulted. Most recent weight and BMI monitored-     Measurements:  Wt Readings from Last 1 Encounters:   05/11/25 118.6 kg (261 lb 6.4 oz)   Body mass index is 34.49 kg/m².    Patient has been screened and assessed by RD.    Malnutrition Type:  Context:    Level:      Malnutrition Characteristic Summary:       Interventions/Recommendations (treatment strategy):        Scheduled Med:   amLODIPine  10 mg Oral Daily    docusate sodium  100 mg Oral BID    enoxparin  40 mg Subcutaneous Daily    piperacillin-tazobactam (Zosyn) IV (PEDS and ADULTS) (extended infusion is not appropriate)  4.5 g Intravenous Q8H    polyethylene glycol  17 g Oral Daily    vancomycin 1,500 mg in D5W 250 mL IVPB (admixture device)  1,500 mg Intravenous Q12H      Continuous Infusions:     PRN Meds:    Current Facility-Administered Medications:     acetaminophen, 650 mg, Oral, Q8H PRN    HYDROcodone-acetaminophen, 1 tablet, Oral, Q6H PRN    melatonin, 6 mg, Oral, Nightly PRN    ondansetron, 4 mg, Intravenous, Q8H PRN    sodium chloride 0.9%, 10 mL, Intravenous, PRN    vancomycin - pharmacy to dose, , Intravenous, pharmacy to manage frequency     Radiology:  I have personally reviewed the following imaging and agree with the radiologist.     Ankle Brachial Indices (SANA)  The right lower extremity ankle brachial index is 1.0  suggesting no   significant arterial obstructive disease.    The left lower extremity ankle brachial index is 1.0 suggesting no   significant arterial obstructive disease.    CV Ultrasound doppler arterial legs bilat  The right lower extremity arterial system is patent with no  evidence of   focal stenosis or occlusion.  The left lower extremity arterial system is patent with no evidence of   focal stenosis or occlusion. Monophasic waveforms are identified from the   proximal femoral artery through the tibial arteries.    RT SANA = 1.0  LT SANA = 1.0      Charmaine Diaz MD  Department of Hospital Medicine   [facility]   05/15/2025

## 2025-05-15 NOTE — PROGRESS NOTES
[facility]                       [address] [address]    PATIENT NAME:       JULISSA SCOTT   YOB: 1965  CSN:                [id number]   MRN:                [id number]  ADMIT DATE:         05/11/2025 16:24:00  PHYSICIAN:          Get Enriquez DPM                            PROGRESS NOTE    DATE:  05/15/2025 00:00:00    SUBJECTIVE:  The patient is seen today, doing well.  No major issues reported.    No fevers.  No chills.  No GI complaints.  He remains on vancomycin and Zosyn.    PHYSICAL EXAMINATION:  VITAL SIGNS:  Stable and afebrile.  EXTREMITIES:  Wound remains unchanged.  Again, still with this small area of   eschar, which is starting to slowly slough off at the margins, but still adhered   centrally.  The remainder of the wound is granular.  All previous marginal   blistering is gone.    LABORATORY DATA:  Cultures are again growing out MRSA and Bordetella.  Anaerobes   are negative to date.    ASSESSMENT:  Recurrent left foot wound infection cellulitis.    PLAN:  Again, overall the area is doing better, slow improvement in the one   central eschar area.  Dressings were changed.  We would switch to [employer]in[employer].    Waiting on the Bordetella sensitivities, which got sent off to the reference   lab.  Hopefully, we will get these within the next several days.  Continue with   all other care for now.        ______________________________  Get Enriquez DPM    GAS/AQS  DD:  05/15/2025  Time:  04:36PM  DT:  05/15/2025  Time:  04:46PM  Job #:  [id number]      PROGRESS NOTE

## 2025-05-15 NOTE — PROGRESS NOTES
[facility]                       [address] [address]    PATIENT NAME:       JULISSA SCOTT   YOB: 1965  CSN:                [id number]   MRN:                [id number]  ADMIT DATE:         05/11/2025 16:24:00  PHYSICIAN:          Get Enriquez DPM                            PROGRESS NOTE    DATE:  05/14/2025 00:00:00    SUBJECTIVE:  The patient was seen today, doing well.  No major issues reported.    He is sitting up on the side of bed, eating.  Remains on antibiotics,   vancomycin, Zosyn.  No issues with such.  No GI complaints.  No chest pain,   shortness of breath.    PHYSICAL EXAMINATION:  VITAL SIGNS:  Stable and he is afebrile.  EXTREMITIES:  Dressings are intact.  Still with some exudate from these areas,   but overall the wounds look much better.  Still with a small little 2 cm   superficial eschar centrally,. but no fluctuance.  No crepitation.  No bogginess   to the areas.  Overall, edema continues to improve.  Minimal inflammatory   changes remain to the leg.  No pain.    LABORATORY DATA:  No labs posted for today.    Updates on his cultures growing out Staph aureus and Bordetella.  Sensitivities   are pending.    ASSESSMENT:  Recurrent left heel wound with secondary cellulitis.    PLAN:  Continue with current care.  Clinically, the inflammatory process is   markedly improved.  Wait for sensitivities to deescalate antibiotics  for him   to be discharged.  Strict nonweightbearing on the foot.  This was reiterated   again today.        ______________________________  Get Enriquez DPM    GAS/AQS  DD:  05/14/2025  Time:  05:10PM  DT:  05/14/2025  Time:  08:10PM  Job #:  [id number]      PROGRESS NOTE

## 2025-05-15 NOTE — PLAN OF CARE
Problem: Adult Inpatient Plan of Care  Goal: Plan of Care Review  5/15/2025 0414 by Jovon Yoder LPN  Outcome: Progressing  5/15/2025 0414 by Jovon Yoder LPN  Outcome: Progressing  Goal: Patient-Specific Goal (Individualized)  5/15/2025 0414 by Jovon Yoder LPN  Outcome: Progressing  5/15/2025 0414 by Jovon Yoder LPN  Outcome: Progressing  Goal: Absence of Hospital-Acquired Illness or Injury  5/15/2025 0414 by Jovon Yoder LPN  Outcome: Progressing  5/15/2025 0414 by Jovon Yoder LPN  Outcome: Progressing  Goal: Optimal Comfort and Wellbeing  5/15/2025 0414 by Jovon Yoder LPN  Outcome: Progressing  5/15/2025 0414 by Jovon Yoder LPN  Outcome: Progressing  Goal: Readiness for Transition of Care  5/15/2025 0414 by Jovon Yoder LPN  Outcome: Progressing  5/15/2025 0414 by Jovon Yoder LPN  Outcome: Progressing     Problem: Acute Kidney Injury/Impairment  Goal: Fluid and Electrolyte Balance  5/15/2025 0414 by Jovon Yoder LPN  Outcome: Progressing  5/15/2025 0414 by Jovon Yoder LPN  Outcome: Progressing  Goal: Improved Oral Intake  5/15/2025 0414 by Jovon Yoder LPN  Outcome: Progressing  5/15/2025 0414 by Jovon Yoder LPN  Outcome: Progressing  Goal: Effective Renal Function  5/15/2025 0414 by Jovon Yoder LPN  Outcome: Progressing  5/15/2025 0414 by Jovon Yoder LPN  Outcome: Progressing     Problem: Wound  Goal: Optimal Coping  5/15/2025 0414 by Jovon Yoder LPN  Outcome: Progressing  5/15/2025 0414 by Jovon Yoder LPN  Outcome: Progressing  Goal: Optimal Functional Ability  5/15/2025 0414 by Jovon Yoder LPN  Outcome: Progressing  5/15/2025 0414 by Jovon Yoder LPN  Outcome: Progressing  Goal: Absence of Infection Signs and Symptoms  5/15/2025 0414 by Jovon Yoder LPN  Outcome: Progressing  5/15/2025 0414 by Jovon Yoder LPN  Outcome: Progressing  Goal: Improved Oral  Intake  5/15/2025 0414 by Jovon Yoder LPN  Outcome: Progressing  5/15/2025 0414 by Jovon Yoder LPN  Outcome: Progressing  Goal: Optimal Pain Control and Function  5/15/2025 0414 by Jovon Yoder LPN  Outcome: Progressing  5/15/2025 0414 by oJvon Yoder LPN  Outcome: Progressing  Goal: Skin Health and Integrity  5/15/2025 0414 by Jovon Yoder LPN  Outcome: Progressing  5/15/2025 0414 by Jovon Yoder LPN  Outcome: Progressing  Goal: Optimal Wound Healing  5/15/2025 0414 by Jovon Yoder LPN  Outcome: Progressing  5/15/2025 0414 by Jovon Yoder LPN  Outcome: Progressing

## 2025-05-16 LAB
BACTERIA BLD CULT: NORMAL
BACTERIA BLD CULT: NORMAL

## 2025-05-16 PROCEDURE — 11000001 HC ACUTE MED/SURG PRIVATE ROOM

## 2025-05-16 PROCEDURE — 63600175 PHARM REV CODE 636 W HCPCS: Performed by: INTERNAL MEDICINE

## 2025-05-16 PROCEDURE — 25000003 PHARM REV CODE 250: Performed by: NURSE PRACTITIONER

## 2025-05-16 PROCEDURE — 63600175 PHARM REV CODE 636 W HCPCS: Performed by: NURSE PRACTITIONER

## 2025-05-16 PROCEDURE — 25000003 PHARM REV CODE 250: Performed by: INTERNAL MEDICINE

## 2025-05-16 PROCEDURE — 27000207 HC ISOLATION

## 2025-05-16 RX ADMIN — MUPIROCIN: 20 OINTMENT TOPICAL at 10:05

## 2025-05-16 RX ADMIN — HYDROCODONE BITARTRATE AND ACETAMINOPHEN 1 TABLET: 7.5; 325 TABLET ORAL at 01:05

## 2025-05-16 RX ADMIN — VANCOMYCIN HYDROCHLORIDE 1250 MG: 1.25 INJECTION, POWDER, LYOPHILIZED, FOR SOLUTION INTRAVENOUS at 05:05

## 2025-05-16 RX ADMIN — POLYETHYLENE GLYCOL 3350 17 G: 17 POWDER, FOR SOLUTION ORAL at 10:05

## 2025-05-16 RX ADMIN — ENOXAPARIN SODIUM 40 MG: 40 INJECTION SUBCUTANEOUS at 05:05

## 2025-05-16 RX ADMIN — HYDROCODONE BITARTRATE AND ACETAMINOPHEN 1 TABLET: 7.5; 325 TABLET ORAL at 07:05

## 2025-05-16 RX ADMIN — PIPERACILLIN SODIUM AND TAZOBACTAM SODIUM 4.5 G: 4; .5 INJECTION, POWDER, LYOPHILIZED, FOR SOLUTION INTRAVENOUS at 09:05

## 2025-05-16 RX ADMIN — HYDROCODONE BITARTRATE AND ACETAMINOPHEN 1 TABLET: 7.5; 325 TABLET ORAL at 03:05

## 2025-05-16 RX ADMIN — DOCUSATE SODIUM 100 MG: 100 CAPSULE, LIQUID FILLED ORAL at 10:05

## 2025-05-16 RX ADMIN — PIPERACILLIN SODIUM AND TAZOBACTAM SODIUM 4.5 G: 4; .5 INJECTION, POWDER, LYOPHILIZED, FOR SOLUTION INTRAVENOUS at 03:05

## 2025-05-16 RX ADMIN — HYDROCODONE BITARTRATE AND ACETAMINOPHEN 1 TABLET: 7.5; 325 TABLET ORAL at 09:05

## 2025-05-16 RX ADMIN — AMLODIPINE BESYLATE 10 MG: 5 TABLET ORAL at 10:05

## 2025-05-16 RX ADMIN — VANCOMYCIN HYDROCHLORIDE 1250 MG: 1.25 INJECTION, POWDER, LYOPHILIZED, FOR SOLUTION INTRAVENOUS at 06:05

## 2025-05-16 RX ADMIN — DOCUSATE SODIUM 100 MG: 100 CAPSULE, LIQUID FILLED ORAL at 09:05

## 2025-05-16 NOTE — PROGRESS NOTES
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     Present to the ED on 05/11/2025 with chief complaint of several days of foul-smelling purulent drainage from his left heel wound.  Denies any fever or chills.  Report significant bilateral lower extremity swelling that has been increasing over the past 2 months.  Denies dyspnea, orthopnea or PNDs.     Upon arrival to ED was afebrile and hemodynamically stable, labs notable for no leukocytosis or left shift, ESR 97 and CRP 47.  LLE venous ultrasound showed no evidence of venous thrombosis.     He was blood cultured and started IV vancomycin and Zosyn, and referred to hospital medicine service and podiatry consulted. Report significant improvement in bilateral leg swelling with 1 dose of IV Lasix , also the erythema extending up in the leg has markedly retracted down to the ankle, Echo was done and show normal LVEF at 60-65% but noted for diastolic dysfunction present otherwise eCVP is normal at 3 and ePASP at 24 mm Hg, no significant valvular abnormalities.  Bilateral arterial ultrasound show patent arterial system as well as normal SANA at 1.0. Evaluated by Podiatry and no plan for surgical debridement, continued IV antibiotic and wound care  Wound culture is growing staph aureus and Bordetella we still awaiting final culture and sensitivity results  Interval Hx:   Patient seen and examined this morning redness around the IV site is better.  Wound culture grew MSSA and Bordetella     Objective/physical exam:  General: In no acute distress, afebrile  Chest: Clear to auscultation bilaterally  Heart: RRR, +S1, S2,  no appreciable murmur  Abdomen: Soft, nontender, BS +  MSK: Warm, no lower extremity edema, no clubbing or cyanosis  Neurologic: Alert and oriented x4, Cranial nerve II-XII intact, Strength 5/5 in all 4 extremities  Skin: Slight induration and redness around the IV site  VITAL SIGNS: 24 HRS MIN & MAX LAST   Temp  Min: 97.6 °F (36.4 °C)  Max: 98.2 °F (36.8 °C) 97.6 °F (36.4 °C)   BP  Min: 118/80  Max: 135/87 134/79   Pulse  Min: 75  Max: 86  82   Resp  Min: 18  Max: 18 18   SpO2  Min: 94 %  Max: 98 % 97 %     I have reviewed the following labs:  Recent Labs   Lab 05/11/25 1836 05/12/25 0411 05/13/25 0437   WBC 10.26 7.61 7.47   RBC 4.23* 3.59* 3.82*   HGB 13.6* 11.5* 12.1*   HCT 41.5* 35.1* 36.4*   MCV 98.1* 97.8* 95.3*   MCH 32.2* 32.0* 31.7*   MCHC 32.8* 32.8* 33.2   RDW 14.4 14.4 14.3    303 299   MPV 8.5 8.6 8.7     Recent Labs   Lab 05/12/25 0411 05/13/25  0437 05/14/25  0421 05/15/25  0343    138  --  135*   K 3.9 4.2  --  3.6    99  --  99   CO2 26 27  --  26   BUN 12.9 14.2  --  17.1   CREATININE 0.91 0.99 1.10 1.18    120*  --  158*   CALCIUM 8.8 9.0  --  9.5   MG  --  2.00  --   --    ALBUMIN 2.8* 2.9*  --  3.0*   PROT 6.5 6.8  --  8.2*   ALKPHOS 65 68  --  61   ALT 14 15  --  16   AST 19 20  --  18   BILITOT 0.6 0.6  --  0.5     Microbiology Results (last 7 days)       Procedure Component Value Units Date/Time    Blood culture #1 **CANNOT BE ORDERED STAT** [[id number]]  (Normal) Collected: 05/11/25 2041    Order Status: Completed Specimen: Blood Updated: 05/15/25 2300     Blood Culture No Growth At 96 Hours    Blood culture #2 **CANNOT BE ORDERED STAT** [[id number]]  (Normal) Collected: 05/11/25 2041    Order Status: Completed Specimen: Blood Updated: 05/15/25 2300     Blood Culture No Growth At 96 Hours    Tissue Culture - Aerobic [[id number]]  (Abnormal)  (Susceptibility) Collected: 05/12/25 1407    Order Status: Completed Specimen: Tissue from Foot, Left Updated: 05/15/25  1148     Tissue - Aerobic Culture Moderate Methicillin resistant Staphylococcus aureus      Few Bordetella trematum     Comment: Susceptibility sent to reference lab. Results to follow on separate report.       Anaerobic Culture [[id number]] Collected: 05/12/25 1407    Order Status: Completed Specimen: Wound from Foot, Left Updated: 05/15/25 0848     Anaerobe Culture No Anaerobes Isolated             See below for Radiology    Assessment/Plan:  Infected left heel ulcer with left foot and leg cellulitis   Chronic bilateral lower extremity edema likely venous stasis and possible lymphedema   Mild cellulitis around the IV site  History of left sciatic nerve injury with chronic LLE neuropathy since teenager, HTN, BECK, prediabetes     Wound culture from the foot is growing staph and Bordetella we are awaiting final culture and sensitivity results continue with vancomycin and Zosyn , called the micro lab and they have sent it to [facility] for the sensitivities for Bordetella discussed this with the patient  Patient has slight redness and slight induration around the IV site continue with warm compresses mupirocin and IV antibiotics Podiatry following     VTE prophylaxis:     Patient condition:  Stable/Fair/Guarded/ Serious/ Critical    Anticipated discharge and Disposition:         All diagnosis and differential diagnosis have been reviewed; assessment and plan has been documented; I have personally reviewed the labs and test results that are presently available; I have reviewed the patients medication list; I have reviewed the consulting providers response and recommendations. I have reviewed or attempted to review medical records based upon their availability    All of the patient's questions have been  addressed and answered. Patient's is agreeable to the above stated plan. I will continue to monitor closely and make adjustments to medical management as needed.    Portions of this note dictated using EMR integrated  voice recognition software, and may be subject to voice recognition errors not corrected at proofreading. Please contact writer for clarification if needed.   _____________________________________________________________________    Malnutrition Status:  Nutrition consulted. Most recent weight and BMI monitored-     Measurements:  Wt Readings from Last 1 Encounters:   05/11/25 118.6 kg (261 lb 6.4 oz)   Body mass index is 34.49 kg/m².    Patient has been screened and assessed by RD.    Malnutrition Type:  Context:    Level:      Malnutrition Characteristic Summary:       Interventions/Recommendations (treatment strategy):        Scheduled Med:   amLODIPine  10 mg Oral Daily    docusate sodium  100 mg Oral BID    enoxparin  40 mg Subcutaneous Daily    mupirocin   Topical (Top) Daily    piperacillin-tazobactam (Zosyn) IV (PEDS and ADULTS) (extended infusion is not appropriate)  4.5 g Intravenous Q8H    polyethylene glycol  17 g Oral Daily    vancomycin 1,250 mg in D5W 250 mL IVPB (admixture device)  1,250 mg Intravenous Q12H      Continuous Infusions:     PRN Meds:    Current Facility-Administered Medications:     acetaminophen, 650 mg, Oral, Q8H PRN    HYDROcodone-acetaminophen, 1 tablet, Oral, Q6H PRN    melatonin, 6 mg, Oral, Nightly PRN    ondansetron, 4 mg, Intravenous, Q8H PRN    sodium chloride 0.9%, 10 mL, Intravenous, PRN    vancomycin - pharmacy to dose, , Intravenous, pharmacy to manage frequency     Radiology:  I have personally reviewed the following imaging and agree with the radiologist.     Ankle Brachial Indices (SANA)  The right lower extremity ankle brachial index is 1.0  suggesting no   significant arterial obstructive disease.    The left lower extremity ankle brachial index is 1.0 suggesting no   significant arterial obstructive disease.    CV Ultrasound doppler arterial legs bilat  The right lower extremity arterial system is patent with no evidence of   focal stenosis or occlusion.  The left  lower extremity arterial system is patent with no evidence of   focal stenosis or occlusion. Monophasic waveforms are identified from the   proximal femoral artery through the tibial arteries.    RT SANA = 1.0  LT SANA = 1.0      Charmaine Diaz MD  Department of Hospital Medicine   [facility]   05/16/2025

## 2025-05-16 NOTE — PROGRESS NOTES
[facility]                       [address] [place] [id number]    PATIENT NAME:       JULISSA SCOTT   YOB: 1965  CSN:                [id number]   MRN:                [id number]  ADMIT DATE:         05/11/2025 16:24:00  PHYSICIAN:          Get Enriquez DPM                            PROGRESS NOTE    DATE:  05/16/2025 00:00:00    SUBJECTIVE:  The patient was seen today, not voicing any complaints.  Again, as   noted yesterday, his Bordetella cultures got sent off to [place].  He remains on   vancomycin and Zosyn.  No complaints.  No fevers or chills.    OBJECTIVE:  Vital signs are stable, and he is afebrile.    LABORATORY DATA:  No labs posted for today.    Again, anaerobes were negative.    The wound cultures grew out MRSA and Bordetella.    The wound site again continues to improve, still with a small central escharotic   area that is starting to slough off at the margins, but still pretty well   adhered.  No fluctuance, crepitation, or bogginess.  Margins are viable.    Inflammatory changes pretty much resolved to the leg.  He still has some notable   edema.    ASSESSMENT:  Left heel wound infection and cellulitis, improved.    PLAN:  Continue with current wound care.  Dressings were placed.  Strict   nonweightbearing on this foot.  This was reiterated.        ______________________________  Get Enriquez DPM    GAS/AQS  DD:  05/16/2025  Time:  03:53PM  DT:  05/16/2025  Time:  04:10PM  Job #:  [id number]      PROGRESS NOTE

## 2025-05-16 NOTE — PLAN OF CARE
Problem: Adult Inpatient Plan of Care  Goal: Plan of Care Review  Outcome: Progressing  Goal: Patient-Specific Goal (Individualized)  Outcome: Progressing  Goal: Absence of Hospital-Acquired Illness or Injury  Outcome: Progressing  Goal: Optimal Comfort and Wellbeing  Outcome: Progressing  Goal: Readiness for Transition of Care  Outcome: Progressing     Problem: Acute Kidney Injury/Impairment  Goal: Fluid and Electrolyte Balance  Outcome: Progressing  Goal: Improved Oral Intake  Outcome: Progressing  Goal: Effective Renal Function  Outcome: Progressing     Problem: Wound  Goal: Optimal Coping  Outcome: Progressing  Goal: Optimal Functional Ability  Outcome: Progressing  Goal: Absence of Infection Signs and Symptoms  Outcome: Progressing  Goal: Improved Oral Intake  Outcome: Progressing  Goal: Optimal Pain Control and Function  Outcome: Progressing  Goal: Skin Health and Integrity  Outcome: Progressing  Goal: Optimal Wound Healing  Outcome: Progressing     Problem: Infection  Goal: Absence of Infection Signs and Symptoms  Outcome: Progressing

## 2025-05-17 VITALS
BODY MASS INDEX: 34.64 KG/M2 | DIASTOLIC BLOOD PRESSURE: 80 MMHG | HEART RATE: 79 BPM | HEIGHT: 73 IN | SYSTOLIC BLOOD PRESSURE: 128 MMHG | TEMPERATURE: 98 F | WEIGHT: 261.38 LBS | RESPIRATION RATE: 18 BRPM | OXYGEN SATURATION: 98 %

## 2025-05-17 LAB
ALBUMIN SERPL-MCNC: 3 G/DL (ref 3.4–4.8)
ALBUMIN/GLOB SERPL: 0.8 RATIO (ref 1.1–2)
ALP SERPL-CCNC: 62 UNIT/L (ref 40–150)
ALT SERPL-CCNC: 17 UNIT/L (ref 0–55)
ANION GAP SERPL CALC-SCNC: 11 MEQ/L
AST SERPL-CCNC: 18 UNIT/L (ref 11–45)
BASOPHILS # BLD AUTO: 0.05 X10(3)/MCL
BASOPHILS NFR BLD AUTO: 0.6 %
BILIRUB SERPL-MCNC: 0.3 MG/DL
BUN SERPL-MCNC: 22.4 MG/DL (ref 8.4–25.7)
CALCIUM SERPL-MCNC: 8.9 MG/DL (ref 8.8–10)
CHLORIDE SERPL-SCNC: 102 MMOL/L (ref 98–107)
CO2 SERPL-SCNC: 24 MMOL/L (ref 23–31)
CREAT SERPL-MCNC: 1.25 MG/DL (ref 0.72–1.25)
CREAT/UREA NIT SERPL: 18
EOSINOPHIL # BLD AUTO: 0.49 X10(3)/MCL (ref 0–0.9)
EOSINOPHIL NFR BLD AUTO: 6.3 %
ERYTHROCYTE [DISTWIDTH] IN BLOOD BY AUTOMATED COUNT: 13.8 % (ref 11.5–17)
GFR SERPLBLD CREATININE-BSD FMLA CKD-EPI: >60 ML/MIN/1.73/M2
GLOBULIN SER-MCNC: 4 GM/DL (ref 2.4–3.5)
GLUCOSE SERPL-MCNC: 147 MG/DL (ref 82–115)
HCT VFR BLD AUTO: 36.2 % (ref 42–52)
HGB BLD-MCNC: 11.8 G/DL (ref 14–18)
IMM GRANULOCYTES # BLD AUTO: 0.03 X10(3)/MCL (ref 0–0.04)
IMM GRANULOCYTES NFR BLD AUTO: 0.4 %
LYMPHOCYTES # BLD AUTO: 1.58 X10(3)/MCL (ref 0.6–4.6)
LYMPHOCYTES NFR BLD AUTO: 20.2 %
MCH RBC QN AUTO: 31.7 PG (ref 27–31)
MCHC RBC AUTO-ENTMCNC: 32.6 G/DL (ref 33–36)
MCV RBC AUTO: 97.3 FL (ref 80–94)
MONOCYTES # BLD AUTO: 1.19 X10(3)/MCL (ref 0.1–1.3)
MONOCYTES NFR BLD AUTO: 15.2 %
NEUTROPHILS # BLD AUTO: 4.49 X10(3)/MCL (ref 2.1–9.2)
NEUTROPHILS NFR BLD AUTO: 57.3 %
NRBC BLD AUTO-RTO: 0 %
PLATELET # BLD AUTO: 262 X10(3)/MCL (ref 130–400)
PMV BLD AUTO: 8.8 FL (ref 7.4–10.4)
POTASSIUM SERPL-SCNC: 3.9 MMOL/L (ref 3.5–5.1)
PROT SERPL-MCNC: 7 GM/DL (ref 5.8–7.6)
RBC # BLD AUTO: 3.72 X10(6)/MCL (ref 4.7–6.1)
SODIUM SERPL-SCNC: 137 MMOL/L (ref 136–145)
VANCOMYCIN TROUGH SERPL-MCNC: 16 UG/ML (ref 15–20)
WBC # BLD AUTO: 7.83 X10(3)/MCL (ref 4.5–11.5)

## 2025-05-17 PROCEDURE — 99223 1ST HOSP IP/OBS HIGH 75: CPT | Mod: ,,, | Performed by: INTERNAL MEDICINE

## 2025-05-17 PROCEDURE — 63600175 PHARM REV CODE 636 W HCPCS: Performed by: INTERNAL MEDICINE

## 2025-05-17 PROCEDURE — 36415 COLL VENOUS BLD VENIPUNCTURE: CPT | Performed by: INTERNAL MEDICINE

## 2025-05-17 PROCEDURE — 25000003 PHARM REV CODE 250: Performed by: NURSE PRACTITIONER

## 2025-05-17 PROCEDURE — 85025 COMPLETE CBC W/AUTO DIFF WBC: CPT | Performed by: INTERNAL MEDICINE

## 2025-05-17 PROCEDURE — 80053 COMPREHEN METABOLIC PANEL: CPT | Performed by: INTERNAL MEDICINE

## 2025-05-17 PROCEDURE — 25000003 PHARM REV CODE 250: Performed by: INTERNAL MEDICINE

## 2025-05-17 PROCEDURE — 80202 ASSAY OF VANCOMYCIN: CPT | Performed by: INTERNAL MEDICINE

## 2025-05-17 PROCEDURE — 63600175 PHARM REV CODE 636 W HCPCS: Performed by: NURSE PRACTITIONER

## 2025-05-17 RX ORDER — SULFAMETHOXAZOLE AND TRIMETHOPRIM 800; 160 MG/1; MG/1
1 TABLET ORAL 2 TIMES DAILY
Qty: 28 TABLET | Refills: 0 | Status: SHIPPED | OUTPATIENT
Start: 2025-05-17 | End: 2025-05-31

## 2025-05-17 RX ORDER — HYDROCODONE BITARTRATE AND ACETAMINOPHEN 7.5; 325 MG/1; MG/1
1 TABLET ORAL EVERY 12 HOURS PRN
Qty: 8 TABLET | Refills: 0 | Status: SHIPPED | OUTPATIENT
Start: 2025-05-17 | End: 2025-05-21

## 2025-05-17 RX ADMIN — HYDROCODONE BITARTRATE AND ACETAMINOPHEN 1 TABLET: 7.5; 325 TABLET ORAL at 11:05

## 2025-05-17 RX ADMIN — PIPERACILLIN SODIUM AND TAZOBACTAM SODIUM 4.5 G: 4; .5 INJECTION, POWDER, LYOPHILIZED, FOR SOLUTION INTRAVENOUS at 02:05

## 2025-05-17 RX ADMIN — PIPERACILLIN SODIUM AND TAZOBACTAM SODIUM 4.5 G: 4; .5 INJECTION, POWDER, LYOPHILIZED, FOR SOLUTION INTRAVENOUS at 05:05

## 2025-05-17 RX ADMIN — POLYETHYLENE GLYCOL 3350 17 G: 17 POWDER, FOR SOLUTION ORAL at 10:05

## 2025-05-17 RX ADMIN — DOCUSATE SODIUM 100 MG: 100 CAPSULE, LIQUID FILLED ORAL at 10:05

## 2025-05-17 RX ADMIN — MUPIROCIN: 20 OINTMENT TOPICAL at 10:05

## 2025-05-17 RX ADMIN — VANCOMYCIN HYDROCHLORIDE 1250 MG: 1.25 INJECTION, POWDER, LYOPHILIZED, FOR SOLUTION INTRAVENOUS at 05:05

## 2025-05-17 RX ADMIN — AMLODIPINE BESYLATE 10 MG: 5 TABLET ORAL at 10:05

## 2025-05-17 RX ADMIN — HYDROCODONE BITARTRATE AND ACETAMINOPHEN 1 TABLET: 7.5; 325 TABLET ORAL at 05:05

## 2025-05-17 NOTE — PROGRESS NOTES
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     Present to the ED on 05/11/2025 with chief complaint of several days of foul-smelling purulent drainage from his left heel wound.  Denies any fever or chills.  Report significant bilateral lower extremity swelling that has been increasing over the past 2 months.  Denies dyspnea, orthopnea or PNDs.     Upon arrival to ED was afebrile and hemodynamically stable, labs notable for no leukocytosis or left shift, ESR 97 and CRP 47.  LLE venous ultrasound showed no evidence of venous thrombosis.     He was blood cultured and started IV vancomycin and Zosyn, and referred to hospital medicine service and podiatry consulted. Report significant improvement in bilateral leg swelling with 1 dose of IV Lasix , also the erythema extending up in the leg has markedly retracted down to the ankle, Echo was done and show normal LVEF at 60-65% but noted for diastolic dysfunction present otherwise eCVP is normal at 3 and ePASP at 24 mm Hg, no significant valvular abnormalities.  Bilateral arterial ultrasound show patent arterial system as well as normal SANA at 1.0. Evaluated by Podiatry and no plan for surgical debridement, continued IV antibiotic and wound care  Wound culture is growing staph aureus and Bordetella we still awaiting final culture and sensitivity results  Interval Hx:   Patient seen and examined this morning redness around the IV site is better.  Wound culture grew MSSA and Bordetella     Objective/physical exam:  General: In no acute distress, afebrile  Chest: Clear to auscultation bilaterally  Heart: RRR, +S1, S2,  no appreciable murmur  Abdomen: Soft, nontender, BS +  MSK: Warm, no lower extremity edema, no clubbing or cyanosis  Neurologic: Alert and oriented x4, Cranial nerve II-XII intact, Strength 5/5 in all 4 extremities  Skin: Slight induration and redness around the IV site  VITAL SIGNS: 24 HRS MIN & MAX LAST   Temp  Min: 97.5 °F (36.4 °C)  Max: 98.2 °F (36.8 °C) 97.8 °F (36.6 °C)   BP  Min: 121/77  Max: 138/84 128/82   Pulse  Min: 82  Max: 89  85   Resp  Min: 18  Max: 18 18   SpO2  Min: 93 %  Max: 99 % 96 %     I have reviewed the following labs:  Recent Labs   Lab 05/12/25 0411 05/13/25 0437 05/17/25  0432   WBC 7.61 7.47 7.83   RBC 3.59* 3.82* 3.72*   HGB 11.5* 12.1* 11.8*   HCT 35.1* 36.4* 36.2*   MCV 97.8* 95.3* 97.3*   MCH 32.0* 31.7* 31.7*   MCHC 32.8* 33.2 32.6*   RDW 14.4 14.3 13.8    299 262   MPV 8.6 8.7 8.8     Recent Labs   Lab 05/13/25  0437 05/14/25  0421 05/15/25  0343 05/17/25  0431     --  135* 137   K 4.2  --  3.6 3.9   CL 99  --  99 102   CO2 27  --  26 24   BUN 14.2  --  17.1 22.4   CREATININE 0.99 1.10 1.18 1.25   *  --  158* 147*   CALCIUM 9.0  --  9.5 8.9   MG 2.00  --   --   --    ALBUMIN 2.9*  --  3.0* 3.0*   PROT 6.8  --  8.2* 7.0   ALKPHOS 68  --  61 62   ALT 15  --  16 17   AST 20  --  18 18   BILITOT 0.6  --  0.5 0.3     Microbiology Results (last 7 days)       Procedure Component Value Units Date/Time    Blood culture #1 **CANNOT BE ORDERED STAT** [[id number]]  (Normal) Collected: 05/11/25 2041    Order Status: Completed Specimen: Blood Updated: 05/16/25 2300     Blood Culture No Growth at 5 days    Blood culture #2 **CANNOT BE ORDERED STAT** [[id number]]  (Normal) Collected: 05/11/25 2041    Order Status: Completed Specimen: Blood Updated: 05/16/25 2300     Blood Culture No Growth at 5 days    Tissue Culture - Aerobic [[id number]]  (Abnormal)  (Susceptibility) Collected: 05/12/25 1407    Order Status: Completed Specimen: Tissue from Foot, Left Updated: 05/15/25 1148      Tissue - Aerobic Culture Moderate Methicillin resistant Staphylococcus aureus      Few Bordetella trematum     Comment: Susceptibility sent to reference lab. Results to follow on separate report.       Anaerobic Culture [[id number]] Collected: 05/12/25 1407    Order Status: Completed Specimen: Wound from Foot, Left Updated: 05/15/25 0817     Anaerobe Culture No Anaerobes Isolated             See below for Radiology    Assessment/Plan:  Infected left heel ulcer with left foot and leg cellulitis   Chronic bilateral lower extremity edema likely venous stasis and possible lymphedema   Mild cellulitis around the IV site  History of left sciatic nerve injury with chronic LLE neuropathy since teenager, HTN, BECK, prediabetes     We have consulted Infectious Diseases about the antibiotic recommendations  In the meantime will continue with vancomycin and Zosyn   I had called Micro Lab and they said they have sent the sensitivities for Bordetella to [facility] and it might take a while for the results to be back  Patient has slight redness and slight induration around the IV site continue with warm compresses mupirocin and IV antibiotics and that is improving   Podiatry following   All other vitals looks stable  Reviewed lab work from this morning that looks stable  VTE prophylaxis:     Patient condition:  Stable/Fair/Guarded/ Serious/ Critical    Anticipated discharge and Disposition:         All diagnosis and differential diagnosis have been reviewed; assessment and plan has been documented; I have personally reviewed the labs and test results that are presently available; I have reviewed the patients medication list; I have reviewed the consulting providers response and recommendations. I have reviewed or attempted to review medical records based upon their availability    All of the patient's questions have been  addressed and answered. Patient's is agreeable to the above stated plan. I will continue to monitor  closely and make adjustments to medical management as needed.    Portions of this note dictated using EMR integrated voice recognition software, and may be subject to voice recognition errors not corrected at proofreading. Please contact writer for clarification if needed.   _____________________________________________________________________    Malnutrition Status:  Nutrition consulted. Most recent weight and BMI monitored-     Measurements:  Wt Readings from Last 1 Encounters:   05/11/25 118.6 kg (261 lb 6.4 oz)   Body mass index is 34.49 kg/m².    Patient has been screened and assessed by RD.    Malnutrition Type:  Context:    Level:      Malnutrition Characteristic Summary:       Interventions/Recommendations (treatment strategy):        Scheduled Med:   amLODIPine  10 mg Oral Daily    docusate sodium  100 mg Oral BID    enoxparin  40 mg Subcutaneous Daily    mupirocin   Topical (Top) Daily    piperacillin-tazobactam (Zosyn) IV (PEDS and ADULTS) (extended infusion is not appropriate)  4.5 g Intravenous Q8H    polyethylene glycol  17 g Oral Daily    vancomycin 1,250 mg in D5W 250 mL IVPB (admixture device)  1,250 mg Intravenous Q12H      Continuous Infusions:     PRN Meds:    Current Facility-Administered Medications:     acetaminophen, 650 mg, Oral, Q8H PRN    HYDROcodone-acetaminophen, 1 tablet, Oral, Q6H PRN    melatonin, 6 mg, Oral, Nightly PRN    ondansetron, 4 mg, Intravenous, Q8H PRN    sodium chloride 0.9%, 10 mL, Intravenous, PRN    vancomycin - pharmacy to dose, , Intravenous, pharmacy to manage frequency     Radiology:  I have personally reviewed the following imaging and agree with the radiologist.     Ankle Brachial Indices (SANA)  The right lower extremity ankle brachial index is 1.0  suggesting no   significant arterial obstructive disease.    The left lower extremity ankle brachial index is 1.0 suggesting no   significant arterial obstructive disease.    CV Ultrasound doppler arterial legs  bilat  The right lower extremity arterial system is patent with no evidence of   focal stenosis or occlusion.  The left lower extremity arterial system is patent with no evidence of   focal stenosis or occlusion. Monophasic waveforms are identified from the   proximal femoral artery through the tibial arteries.    RT SANA = 1.0  LT SANA = 1.0      Charmaine Diaz MD  Department of Hospital Medicine   [facility]   05/17/2025

## 2025-05-17 NOTE — PROGRESS NOTES
Pharmacokinetic Assessment Follow Up: IV Vancomycin    Vancomycin serum concentration assessment(s):    The trough level was drawn correctly and can be used to guide therapy at this time. The measurement is within the desired definitive target range of 15 to 20 mcg/mL.    Vancomycin Regimen Plan:    Continue regimen to Vancomycin 1250 mg IV every 12 hours with next serum trough concentration measured at 0430 prior to the 0530 dose on 05/19.    Drug levels (last 3 results):  Recent Labs   Lab Result Units 05/15/25  0925 05/17/25  0431   Vancomycin Trough ug/ml 21.4* 16.0       Pharmacy will continue to follow and monitor vancomycin.    Please contact pharmacy at extension [phone] for questions regarding this assessment.    Thank you for the consult,   Jessi York       Patient brief summary:  Kamaljit Frias is a 59 y.o. male initiated on antimicrobial therapy with IV Vancomycin for treatment of skin & soft tissue infection.    Drug Allergies:   Review of patient's allergies indicates:  No Known Allergies    Actual Body Weight:   118.6 kg    Renal Function:   Estimated Creatinine Clearance: 83.7 mL/min (based on SCr of 1.25 mg/dL).,     Dialysis Method (if applicable):  N/A    CBC (last 72 hours):  Recent Labs   Lab Result Units 05/17/25  0432   WBC x10(3)/mcL 7.83   Hgb g/dL 11.8*   Hct % 36.2*   Platelet x10(3)/mcL 262   Mono % % 15.2   Eos % % 6.3   Basophil % % 0.6       Metabolic Panel (last 72 hours):  Recent Labs   Lab Result Units 05/15/25  0343 05/17/25  0431   Sodium mmol/L 135* 137   Potassium mmol/L 3.6 3.9   Chloride mmol/L 99 102   CO2 mmol/L 26 24   Glucose mg/dL 158* 147*   Blood Urea Nitrogen mg/dL 17.1 22.4   Creatinine mg/dL 1.18 1.25   Albumin g/dL 3.0* 3.0*   Bilirubin Total mg/dL 0.5 0.3   ALP unit/L 61 62   AST unit/L 18 18   ALT unit/L 16 17       Vancomycin Administrations:  vancomycin given in the last 96 hours                     vancomycin 1,250 mg in D5W 250 mL IVPB (admixture device)  (mg) 1,250 mg New Bag 05/17/25 0513     1,250 mg New Bag 05/16/25 1810     1,250 mg New Bag  0555     1,250 mg New Bag 05/15/25 1701    vancomycin 1,500 mg in D5W 250 mL IVPB (admixture device) (mg) 1,500 mg New Bag 05/14/25 2247     1,500 mg New Bag  1138     1,500 mg New Bag 05/13/25 2357     1,500 mg New Bag  1106                    Microbiologic Results:  Microbiology Results (last 7 days)       Procedure Component Value Units Date/Time    Blood culture #1 **CANNOT BE ORDERED STAT** [[id number]]  (Normal) Collected: 05/11/25 2041    Order Status: Completed Specimen: Blood Updated: 05/16/25 2300     Blood Culture No Growth at 5 days    Blood culture #2 **CANNOT BE ORDERED STAT** [[id number]]  (Normal) Collected: 05/11/25 2041    Order Status: Completed Specimen: Blood Updated: 05/16/25 2300     Blood Culture No Growth at 5 days    Tissue Culture - Aerobic [[id number]]  (Abnormal)  (Susceptibility) Collected: 05/12/25 1407    Order Status: Completed Specimen: Tissue from Foot, Left Updated: 05/15/25 1148     Tissue - Aerobic Culture Moderate Methicillin resistant Staphylococcus aureus      Few Bordetella trematum     Comment: Susceptibility sent to reference lab. Results to follow on separate report.       Anaerobic Culture [[id number]] Collected: 05/12/25 1407    Order Status: Completed Specimen: Wound from Foot, Left Updated: 05/15/25 0848     Anaerobe Culture No Anaerobes Isolated

## 2025-05-17 NOTE — PROGRESS NOTES
[facility]                       [address] [place] [address]    PATIENT NAME:       JULISSA SCOTT   YOB: 1965  CSN:                [id number]   MRN:                [id number]  ADMIT DATE:         05/11/2025 16:24:00  PHYSICIAN:          Get Enriquez DPM                            PROGRESS NOTE    DATE:  05/17/2025 00:00:00    SUBJECTIVE:  The patient is seen today.  Not voicing any complaints.  No fevers.    No chills.  No GI complaints.  Remains on vancomycin and Zosyn.    PHYSICAL EXAMINATION:  VITAL SIGNS:  Stable and afebrile.    LABORATORY DATA:  Today reveals white cell count 7.8, H and H 11.8 and 36.2.    Blood sugars mid 100s.  BUN and creatinine 22 and 1.25.    Dressings are intact.  Wound site again continues to clear out.  Still with a   small central fibrotic area.  No fluctuance.  No crepitation.  No bogginess.    Generalized edema persists to the extremities.  All erythema has completely   resolved to the leg and the foot.    ASSESSMENT:  Recurrent left heel wound with secondary infection, cellulitis.    PLAN:  Dressings were placed.  Continue using Dakin's solution daily.  Strict   nonweightbearing on the foot.  Again, this was reiterated and has been done   every day since his admission.  Await on ID input concerning the Bordetella   cultures, these were sent out to a reference lab.  I suspect it is going to take   a minimal of a week to come back at [place].        ______________________________  Get Enriquez DPM    GAS/AQS  DD:  05/17/2025  Time:  08:31AM  DT:  05/17/2025  Time:  08:57AM  Job #:  [id number]      PROGRESS NOTE

## 2025-05-17 NOTE — PLAN OF CARE
Problem: Adult Inpatient Plan of Care  Goal: Plan of Care Review  Outcome: Met  Goal: Patient-Specific Goal (Individualized)  Outcome: Met  Goal: Absence of Hospital-Acquired Illness or Injury  Outcome: Met  Goal: Optimal Comfort and Wellbeing  Outcome: Met  Goal: Readiness for Transition of Care  Outcome: Met     Problem: Acute Kidney Injury/Impairment  Goal: Fluid and Electrolyte Balance  Outcome: Met  Goal: Improved Oral Intake  Outcome: Met  Goal: Effective Renal Function  Outcome: Met     Problem: Wound  Goal: Optimal Coping  Outcome: Met  Goal: Optimal Functional Ability  Outcome: Met  Goal: Absence of Infection Signs and Symptoms  Outcome: Met  Goal: Improved Oral Intake  Outcome: Met  Goal: Optimal Pain Control and Function  Outcome: Met  Goal: Skin Health and Integrity  Outcome: Met  Goal: Optimal Wound Healing  Outcome: Met     Problem: Infection  Goal: Absence of Infection Signs and Symptoms  Outcome: Met

## 2025-05-19 ENCOUNTER — TELEPHONE (OUTPATIENT)
Dept: PRIMARY CARE CLINIC | Facility: CLINIC | Age: 60
End: 2025-05-19
Payer: COMMERCIAL

## 2025-05-19 NOTE — TELEPHONE ENCOUNTER
Copied from CRM #[id number]. Topic: Appointments - Appointment Scheduling  >> May 19, 2025  8:14 AM Yakelin wrote:  .Who Called: Kamaljit Frias    Caller is requesting a sooner appointment. Caller declined first available appointment listed below. Caller will not accept being placed on the waitlist and is requesting a message be sent to doctor.    When is the first available appointment?06/18  Options offered (Virtual Visit, Urgent Care): [facility]  Symptoms:wound care      Preferred Method of Contact: Phone Call  Patient's Preferred Phone Number on File: [phone]   Best Call Back Number, if different:  Additional Information: pt needs appt in one week please

## 2025-05-20 LAB — MAYO GENERIC ORDERABLE RESULT: ABNORMAL

## 2025-05-20 NOTE — DISCHARGE SUMMARY
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     Present to the ED on 05/11/2025 with chief complaint of several days of foul-smelling purulent drainage from his left heel wound.  Denies any fever or chills.  Report significant bilateral lower extremity swelling that has been increasing over the past 2 months.  Denies dyspnea, orthopnea or PNDs.     Upon arrival to ED was afebrile and hemodynamically stable, labs notable for no leukocytosis or left shift, ESR 97 and CRP 47.  LLE venous ultrasound showed no evidence of venous thrombosis.     He was blood cultured and started IV vancomycin and Zosyn, and referred to hospital medicine service and podiatry consulted. Report significant improvement in bilateral leg swelling with 1 dose of IV Lasix , also the erythema extending up in the leg has markedly retracted down to the ankle, Echo was done and show normal LVEF at 60-65% but noted for diastolic dysfunction present otherwise eCVP is normal at 3 and ePASP at 24 mm Hg, no significant valvular abnormalities.  Bilateral arterial ultrasound show patent arterial system as well as normal SANA at 1.0. Evaluated by Podiatry and no plan for surgical debridement, continued IV antibiotic and wound care  Wound culture is growing staph aureus and Bordetella ID was consulted and they recommended Bactrim with a follow up with PCP and ID patient was discharged home in stable condition       Interval Hx:   Patient seen and examined this morning redness around the IV site is better.  Wound culture grew MSSA and Bordetella      Objective/physical exam:  General: In no acute  distress, afebrile  Chest: Clear to auscultation bilaterally  Heart: RRR, +S1, S2, no appreciable murmur  Abdomen: Soft, nontender, BS +  MSK: Warm, no lower extremity edema, no clubbing or cyanosis  Neurologic: Alert and oriented x4, Cranial nerve II-XII intact, Strength 5/5 in all 4 extremities  Skin: Slight induration and redness around the IV site  VITAL SIGNS: 24 HRS MIN & MAX LAST   Temp  Min: 97.5 °F (36.4 °C)  Max: 98.2 °F (36.8 °C) 97.8 °F (36.6 °C)   BP  Min: 121/77  Max: 138/84 128/82   Pulse  Min: 82  Max: 89  85   Resp  Min: 18  Max: 18 18   SpO2  Min: 93 %  Max: 99 % 96 %      I have reviewed the following labs:        Recent Labs   Lab 05/12/25  0411 05/13/25  0437 05/17/25  0432   WBC 7.61 7.47 7.83   RBC 3.59* 3.82* 3.72*   HGB 11.5* 12.1* 11.8*   HCT 35.1* 36.4* 36.2*   MCV 97.8* 95.3* 97.3*   MCH 32.0* 31.7* 31.7*   MCHC 32.8* 33.2 32.6*   RDW 14.4 14.3 13.8    299 262   MPV 8.6 8.7 8.8             Recent Labs   Lab 05/13/25  0437 05/14/25  0421 05/15/25  0343 05/17/25  0431     --  135* 137   K 4.2  --  3.6 3.9   CL 99  --  99 102   CO2 27  --  26 24   BUN 14.2  --  17.1 22.4   CREATININE 0.99 1.10 1.18 1.25   *  --  158* 147*   CALCIUM 9.0  --  9.5 8.9   MG 2.00  --   --   --    ALBUMIN 2.9*  --  3.0* 3.0*   PROT 6.8  --  8.2* 7.0   ALKPHOS 68  --  61 62   ALT 15  --  16 17   AST 20  --  18 18   BILITOT 0.6  --  0.5 0.3      Microbiology Results (last 7 days)         Procedure Component Value Units Date/Time     Blood culture #1 **CANNOT BE ORDERED STAT** [[id number]]  (Normal) Collected: 05/11/25 2041     Order Status: Completed Specimen: Blood Updated: 05/16/25 2300       Blood Culture No Growth at 5 days     Blood culture #2 **CANNOT BE ORDERED STAT** [[id number]]  (Normal) Collected: 05/11/25 2041     Order Status: Completed Specimen: Blood Updated: 05/16/25 2300       Blood Culture No Growth at 5 days     Tissue Culture - Aerobic [[id number]]  (Abnormal)   (Susceptibility) Collected: 05/12/25 6444     Order Status: Completed Specimen: Tissue from Foot, Left Updated: 05/15/25 1148       Tissue - Aerobic Culture Moderate Methicillin resistant Staphylococcus aureus         Few Bordetella trematum       Comment: Susceptibility sent to reference lab. Results to follow on separate report.        Anaerobic Culture [[id number]] Collected: 05/12/25 1409     Order Status: Completed Specimen: Wound from Foot, Left Updated: 05/15/25 0848       Anaerobe Culture No Anaerobes Isolated                See below for Radiology     Discharge diagnosis   Infected left heel ulcer with left foot and leg cellulitis   Chronic bilateral lower extremity edema likely venous stasis and possible lymphedema   Mild cellulitis around the IV site  History of left sciatic nerve injury with chronic LLE neuropathy since teenager, HTN, BECK, prediabetes         Patient condition:  Stable/Fair/Guarded/ Serious/ Critical     Anticipated discharge and Disposition:           All diagnosis and differential diagnosis have been reviewed; assessment and plan has been documented; I have personally reviewed the labs and test results that are presently available; I have reviewed the patients medication list; I have reviewed the consulting providers response and recommendations. I have reviewed or attempted to review medical records based upon their availability     All of the patient's questions have been  addressed and answered. Patient's is agreeable to the above stated plan. I will continue to monitor closely and make adjustments to medical management as needed.     Portions of this note dictated using EMR integrated voice recognition software, and may be subject to voice recognition errors not corrected at proofreading. Please contact writer for clarification if needed.   _____________________________________________________________________     Malnutrition Status:  Nutrition consulted. Most recent weight and BMI  monitored-      Measurements:      Wt Readings from Last 1 Encounters:   05/11/25 118.6 kg (261 lb 6.4 oz)   Body mass index is 34.49 kg/m².     Patient has been screened and assessed by RD.     Malnutrition Type:  Context:    Level:       Malnutrition Characteristic Summary:     Interventions/Recommendations (treatment strategy):     Scheduled Med:   amLODIPine  10 mg Oral Daily    docusate sodium  100 mg Oral BID    enoxparin  40 mg Subcutaneous Daily    mupirocin   Topical (Top) Daily    piperacillin-tazobactam (Zosyn) IV (PEDS and ADULTS) (extended infusion is not appropriate)  4.5 g Intravenous Q8H    polyethylene glycol  17 g Oral Daily    vancomycin 1,250 mg in D5W 250 mL IVPB (admixture device)  1,250 mg Intravenous Q12H      Continuous Infusions:     PRN Meds:     Current Facility-Administered Medications:     acetaminophen, 650 mg, Oral, Q8H PRN    HYDROcodone-acetaminophen, 1 tablet, Oral, Q6H PRN    melatonin, 6 mg, Oral, Nightly PRN    ondansetron, 4 mg, Intravenous, Q8H PRN    sodium chloride 0.9%, 10 mL, Intravenous, PRN    vancomycin - pharmacy to dose, , Intravenous, pharmacy to manage frequency      Radiology:  I have personally reviewed the following imaging and agree with the radiologist.      Ankle Brachial Indices (SANA)  The right lower extremity ankle brachial index is 1.0  suggesting no   significant arterial obstructive disease.    The left lower extremity ankle brachial index is 1.0 suggesting no   significant arterial obstructive disease.    CV Ultrasound doppler arterial legs bilat  The right lower extremity arterial system is patent with no evidence of   focal stenosis or occlusion.  The left lower extremity arterial system is patent with no evidence of   focal stenosis or occlusion. Monophasic waveforms are identified from the   proximal femoral artery through the tibial arteries.     RT SANA = 1.0  LT SANA = 1.0        Charmaine Diaz MD  Department of Hospital Medicine   [facility]   05/17/2025

## 2025-05-22 LAB
BACTERIA TISS AEROBE CULT: ABNORMAL
BACTERIA TISS AEROBE CULT: ABNORMAL

## 2025-05-28 ENCOUNTER — OFFICE VISIT (OUTPATIENT)
Dept: INFECTIOUS DISEASES | Facility: CLINIC | Age: 60
End: 2025-05-28
Payer: COMMERCIAL

## 2025-05-28 ENCOUNTER — APPOINTMENT (OUTPATIENT)
Dept: LAB | Facility: HOSPITAL | Age: 60
End: 2025-05-28
Payer: COMMERCIAL

## 2025-05-28 VITALS
RESPIRATION RATE: 18 BRPM | DIASTOLIC BLOOD PRESSURE: 88 MMHG | OXYGEN SATURATION: 98 % | SYSTOLIC BLOOD PRESSURE: 138 MMHG | TEMPERATURE: 98 F | WEIGHT: 264.38 LBS | HEIGHT: 73 IN | BODY MASS INDEX: 35.04 KG/M2 | HEART RATE: 110 BPM

## 2025-05-28 DIAGNOSIS — I89.0 LYMPHEDEMA OF LEFT LEG: ICD-10-CM

## 2025-05-28 DIAGNOSIS — L08.9: ICD-10-CM

## 2025-05-28 DIAGNOSIS — L97.509 TYPE 2 DIABETES MELLITUS WITH FOOT ULCER, WITHOUT LONG-TERM CURRENT USE OF INSULIN: ICD-10-CM

## 2025-05-28 DIAGNOSIS — L03.116 CELLULITIS OF LEFT LOWER EXTREMITY: Primary | ICD-10-CM

## 2025-05-28 DIAGNOSIS — S91.302A NON HEALING LEFT HEEL WOUND: ICD-10-CM

## 2025-05-28 DIAGNOSIS — L08.9 LEFT FOOT INFECTION: ICD-10-CM

## 2025-05-28 DIAGNOSIS — E11.621 TYPE 2 DIABETES MELLITUS WITH FOOT ULCER, WITHOUT LONG-TERM CURRENT USE OF INSULIN: ICD-10-CM

## 2025-05-28 DIAGNOSIS — S90.822S: ICD-10-CM

## 2025-05-28 DIAGNOSIS — N17.9 AKI (ACUTE KIDNEY INJURY): ICD-10-CM

## 2025-05-28 LAB
ALBUMIN SERPL-MCNC: 3.7 G/DL (ref 3.4–4.8)
ALBUMIN/GLOB SERPL: 0.9 RATIO (ref 1.1–2)
ALP SERPL-CCNC: 67 UNIT/L (ref 40–150)
ALT SERPL-CCNC: 18 UNIT/L (ref 0–55)
ANION GAP SERPL CALC-SCNC: 12 MEQ/L
AST SERPL-CCNC: 21 UNIT/L (ref 11–45)
BASOPHILS # BLD AUTO: 0.09 X10(3)/MCL
BASOPHILS NFR BLD AUTO: 1 %
BILIRUB SERPL-MCNC: 0.4 MG/DL
BUN SERPL-MCNC: 17.6 MG/DL (ref 8.4–25.7)
CALCIUM SERPL-MCNC: 9.3 MG/DL (ref 8.8–10)
CHLORIDE SERPL-SCNC: 104 MMOL/L (ref 98–107)
CO2 SERPL-SCNC: 24 MMOL/L (ref 23–31)
CREAT SERPL-MCNC: 1.17 MG/DL (ref 0.72–1.25)
CREAT/UREA NIT SERPL: 15
CRP SERPL-MCNC: 9.3 MG/L
EOSINOPHIL # BLD AUTO: 0.38 X10(3)/MCL (ref 0–0.9)
EOSINOPHIL NFR BLD AUTO: 4.1 %
ERYTHROCYTE [DISTWIDTH] IN BLOOD BY AUTOMATED COUNT: 13.3 % (ref 11.5–17)
ERYTHROCYTE [SEDIMENTATION RATE] IN BLOOD: 46 MM/HR (ref 0–20)
GFR SERPLBLD CREATININE-BSD FMLA CKD-EPI: >60 ML/MIN/1.73/M2
GLOBULIN SER-MCNC: 4.2 GM/DL (ref 2.4–3.5)
GLUCOSE SERPL-MCNC: 117 MG/DL (ref 82–115)
HCT VFR BLD AUTO: 40.1 % (ref 42–52)
HGB BLD-MCNC: 13.1 G/DL (ref 14–18)
IMM GRANULOCYTES # BLD AUTO: 0.03 X10(3)/MCL (ref 0–0.04)
IMM GRANULOCYTES NFR BLD AUTO: 0.3 %
LYMPHOCYTES # BLD AUTO: 2.21 X10(3)/MCL (ref 0.6–4.6)
LYMPHOCYTES NFR BLD AUTO: 23.8 %
MCH RBC QN AUTO: 31.5 PG (ref 27–31)
MCHC RBC AUTO-ENTMCNC: 32.7 G/DL (ref 33–36)
MCV RBC AUTO: 96.4 FL (ref 80–94)
MONOCYTES # BLD AUTO: 1.02 X10(3)/MCL (ref 0.1–1.3)
MONOCYTES NFR BLD AUTO: 11 %
NEUTROPHILS # BLD AUTO: 5.57 X10(3)/MCL (ref 2.1–9.2)
NEUTROPHILS NFR BLD AUTO: 59.8 %
NRBC BLD AUTO-RTO: 0 %
PLATELET # BLD AUTO: 419 X10(3)/MCL (ref 130–400)
PMV BLD AUTO: 9.2 FL (ref 7.4–10.4)
POTASSIUM SERPL-SCNC: 4.4 MMOL/L (ref 3.5–5.1)
PROT SERPL-MCNC: 7.9 GM/DL (ref 5.8–7.6)
RBC # BLD AUTO: 4.16 X10(6)/MCL (ref 4.7–6.1)
SODIUM SERPL-SCNC: 140 MMOL/L (ref 136–145)
WBC # BLD AUTO: 9.3 X10(3)/MCL (ref 4.5–11.5)

## 2025-05-28 PROCEDURE — 99999 PR PBB SHADOW E&M-EST. PATIENT-LVL IV: CPT | Mod: PBBFAC,,,

## 2025-05-28 PROCEDURE — 36415 COLL VENOUS BLD VENIPUNCTURE: CPT

## 2025-05-28 RX ORDER — KETOCONAZOLE 20 MG/ML
SHAMPOO, SUSPENSION TOPICAL
Status: CANCELLED | OUTPATIENT
Start: 2025-05-29

## 2025-05-28 NOTE — PROGRESS NOTES
Subjective:       Patient ID: Kamaljit Frias 59 y.o.     Chief Complaint:   Chief Complaint   Patient presents with    Hospital Follow Up     MRSA        HPI:  5/17/2025 office visit:   59-year-old man with history of neuropathy involving left lower extremity, due to hold injury to left sciatic nerve many years ago. Over the past year he has been living in his car and developed edema to both legs. He damaged left heel while trying to put on his boot over his swollen foot 1 year ago. He has had repeated break down of the left heel with infections on and off since, with this being the 4th episode. Patient came in on this occasion because of increased drainage and smell from the wound. On admission started on antibiotics including vancomycin and Zosyn. With that and wound care he has improved and ID is asked to recommend antibiotics in preparation for discharge home. Patient has not had fever or other constitutional symptoms.     ASSESSMENT:  Soft tissue infection to left heel with MRSA and Bordetella species isolated.  Similar organisms isolated by our lab 1 year ago and the reference lab re-identified the 2nd organism as Alcaligenes faecalis, susceptible to Bactrim.  Suspect that will be the same organism once again this time.  No associated bacteremia.  Neuropathy involving left lower extremity, chronic leg edema, predisposing the patient to nonhealing left heel ulcer     PLAN:  Replace Zosyn and vancomycin with Bactrim double strength 1 tablet twice a day x 2 weeks  Continue aggressive local wound care  Manage edema as best as possible to help with wound healing  ID clinic follow-up in 1 week with BMP to follow finalized culture report (pending from [place]).    5/28/2025 office visit:   Mr. Frias presents for follow-up. Continues on Bactrim 1tab DS q12 hours. Denies any fever, chills, or night sweats. Denies any nausea, vomiting, or diarrhea. Continues to see podiatry.     Past Medical History:   Diagnosis Date     Essential (primary) hypertension     Neuropathy of lower extremity         Past Surgical History:   Procedure Laterality Date    COLOSTOMY      DEBRIDEMENT OF FOOT Left 2025    Procedure: DEBRIDEMENT, FOOT;  Surgeon: Get Enriquez DPM;  Location: [facility];  Service: Podiatry;  Laterality: Left;    Detachment at Left 1st Toe, Complete, Open Approach Left 2017    herniated disc repaired   2015    JOINT REPLACEMENT  Oct. 2023 2024    Left hip replacement and right hip replacement    left femur break       left hip replacement  2023    sciatic nerve repair  Left         Social History     Socioeconomic History    Marital status:     Number of children: 0   Occupational History    Occupation: oil    Tobacco Use    Smoking status: Former     Current packs/day: 0.00     Average packs/day: 0.2 packs/day for 28.0 years (5.6 ttl pk-yrs)     Types: Cigarettes, Cigars     Start date: 1981     Quit date: 2009     Years since quittin.4    Smokeless tobacco: Former     Types: Snuff    Tobacco comments:     Age started: 18; Age stopped: 54   Substance and Sexual Activity    Alcohol use: Yes     Comment: every 2-3 days    Drug use: Never    Sexual activity: Not Currently     Partners: Female     Birth control/protection: Condom     Social Drivers of Health     Financial Resource Strain: High Risk (2025)    Overall Financial Resource Strain (CARDIA)     Difficulty of Paying Living Expenses: Very hard   Food Insecurity: Food Insecurity Present (2025)    Hunger Vital Sign     Worried About Running Out of Food in the Last Year: Often true     Ran Out of Food in the Last Year: Often true   Transportation Needs: No Transportation Needs (2025)    PRAPARE - Transportation     Lack of Transportation (Medical): No     Lack of Transportation (Non-Medical): No   Stress: Stress Concern Present (2025)    Citizen of [place] [employer] [employer] -  "Occupational Stress Questionnaire     Feeling of Stress : Very much   Housing Stability: High Risk (2025)    Housing Stability Vital Sign     Unable to Pay for Housing in the Last Year: Yes     Homeless in the Last Year: Yes        Family History   Problem Relation Name Age of Onset    Hypertension Mother No history of stroke     Stroke Father Get Frias  of a stroke at the age of 44     Fibromyalgia Sister      No Known Problems Sister          Review of patient's allergies indicates:  No Known Allergies     Immunization History   Administered Date(s) Administered    COVID-19 Vaccine 2021, 2021    COVID-19, MRNA, LN-S, PF (MODERNA FULL 0.5 ML DOSE) 2021, 2021    Influenza (FLUBLOK) - Quadrivalent - Recombinant - PF *Preferred* (egg allergy) 2022    Influenza - Quadrivalent 10/05/2023    Influenza - Quadrivalent - PF *Preferred* (6 months and older) 2020, 10/05/2023    Influenza - Trivalent - Fluarix, Flulaval, Fluzone, Afluria - PF 2015, 2020, 10/09/2024    Influenza Whole 2015    Pneumococcal Conjugate - 13 Valent 2015    Pneumococcal Polysaccharide - 23 Valent 2015    Tdap 2019, 2019, 03/10/2025    Zoster Recombinant 10/25/2023        Review of Systems   All other systems reviewed and are negative.         Objective:      /88 (BP Location: Left arm, Patient Position: Sitting)   Pulse 110   Temp 97.8 °F (36.6 °C) (Oral)   Resp 18   Ht 6' 1" (1.854 m)   Wt 119.9 kg (264 lb 6.4 oz)   SpO2 98%   BMI 34.88 kg/m²      Physical Exam  Vitals reviewed.   Constitutional:       General: He is not in acute distress.     Appearance: Normal appearance. He is not toxic-appearing.      Comments: walker   HENT:      Mouth/Throat:      Mouth: Mucous membranes are moist.      Pharynx: No oropharyngeal exudate or posterior oropharyngeal erythema.   Eyes:      General: No scleral icterus.     Conjunctiva/sclera: Conjunctivae " normal.      Pupils: Pupils are equal, round, and reactive to light.   Cardiovascular:      Rate and Rhythm: Normal rate and regular rhythm.      Pulses: Normal pulses.      Heart sounds: No murmur heard.  Pulmonary:      Effort: Pulmonary effort is normal. No respiratory distress.      Breath sounds: Normal breath sounds.   Abdominal:      General: Abdomen is flat. Bowel sounds are normal.      Palpations: Abdomen is soft.      Tenderness: There is no abdominal tenderness.   Musculoskeletal:         General: No swelling.      Cervical back: Normal range of motion and neck supple.      Comments: Left heel with open wound- evidence of good granulation   Lymphadenopathy:      Cervical: No cervical adenopathy.   Skin:     General: Skin is warm and dry.      Findings: No rash.   Neurological:      General: No focal deficit present.      Mental Status: He is alert and oriented to person, place, and time.   Psychiatric:         Mood and Affect: Mood normal.         Behavior: Behavior normal.          Labs: Reviewed most recent relevant labs available, notable results highlighted in this note    Imaging: Reviewed most recent relevant imaging studies available, notable results highlighted in this note    Assessment:       Problem List Items Addressed This Visit          Renal/    BROOKS (acute kidney injury)       ID    Cellulitis of left lower extremity - Primary    Relevant Orders    CBC Auto Differential (Completed)    Comprehensive Metabolic Panel (Completed)    C-Reactive Protein (Completed)    Sedimentation rate (Completed)    Left foot infection    Relevant Orders    CBC Auto Differential (Completed)    Comprehensive Metabolic Panel (Completed)    C-Reactive Protein (Completed)    Sedimentation rate (Completed)       Endocrine    Type 2 diabetes mellitus with foot ulcer, without long-term current use of insulin       Orthopedic    Blister of left heel with infection    Relevant Orders    CBC Auto Differential  (Completed)    Comprehensive Metabolic Panel (Completed)    C-Reactive Protein (Completed)    Sedimentation rate (Completed)    Non healing left heel wound       Other    Lymphedema of left leg          Plan:   Mr. Frias is being treated for soft tissue infection to left heel with a MRSA and Bordetella species isolated.  He is on a course of piperacillin/tazobactam and vancomycin while inpatient and then transitioned to TMP/SMX 1 tab double-strength q.12 hours for a total of 2 weeks ending today.  -continue aggressive wound care  -educated on the importance of strict glycemic control and high-protein diet for adequate wound healing  -can consider lymphedema therapy  -once antimicrobials or complete can monitor off.  If systemic or localized signs of infection present can contact ID clinic.    -can follow up again in 4 weeks      Portions of this note dictated using EMR integrated voice recognition software, and may be subject to voice recognition errors not corrected at proofreading. Please contact writer for clarification if needed.    45 minutes of total time spent on the encounter, which includes face to face time and non-face to face time preparing to see the patient (eg, review of tests), Obtaining and/or reviewing separately obtained history, Documenting clinical information in the electronic or other health record, Independently interpreting results (not separately reported) and communicating results to the patient/family/caregiver, or Care coordination (not separately reported).

## 2025-05-30 ENCOUNTER — HOSPITAL ENCOUNTER (OUTPATIENT)
Dept: WOUND CARE | Facility: HOSPITAL | Age: 60
Discharge: HOME | End: 2025-05-30
Attending: FAMILY MEDICINE
Payer: COMMERCIAL

## 2025-05-30 VITALS
SYSTOLIC BLOOD PRESSURE: 128 MMHG | TEMPERATURE: 98 F | RESPIRATION RATE: 18 BRPM | DIASTOLIC BLOOD PRESSURE: 84 MMHG | HEART RATE: 94 BPM | OXYGEN SATURATION: 96 %

## 2025-05-30 DIAGNOSIS — S81.802D WOUND OF LEFT LOWER EXTREMITY, SUBSEQUENT ENCOUNTER: Primary | ICD-10-CM

## 2025-05-30 PROCEDURE — 99214 OFFICE O/P EST MOD 30 MIN: CPT

## 2025-05-30 PROCEDURE — 27000999 HC MEDICAL RECORD PHOTO DOCUMENTATION
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
## 2025-05-30 NOTE — PATIENT INSTRUCTIONS
Pt seen today by: Oren Hill DO    Home health and self care DRESSING INSTRUCTIONS:        Wound location: Left foot    Dressings to be changed every other day and as needed if soiled or not intact.      Cleanse wound with half strength Dakins wound cleanser  Apply polymem max to the wound bed  Cover with dry gauze and abd  Wrap with kerlix and tape      Wound location: Right foot    Dressings to be changed daily and as needed if soiled or not intact.      Cleanse wound with half strength Dakins wound cleanser  And keep dry    Compression with: tubigrip size G to bilateral legs    Return visit: 3 weeks    FOR ANY WORSENING CONDITION PRIOR TO NEXT APPOINTMENT REPORT TO THE NEAREST EMERGENCY ROOM    Nutrition:  The current daily value (%DV) for protein is 50 grams per day and is meant as a general goal for most people. Further increasing your dietary protein intake is very important for wound healing. Typically one needs over 100g of protein per day to help with wound healing needs.  If you are a dialysis patient or have problems with your kidneys, talk to your Nephrologist about how much protein you can take in with your condition.  Examples of high protein items that can be added to your diet include: eggs, chicken, red meats, almonds, cottage cheese, Greek yogurt, beans, and peanut butter.  Fortified protein bars, shakes and drinks can add 15-30 additional grams of protein per serving.  Also add:   1 daily general multivitamin   Vitamin C : 500mg twice daily   Zinc 220 mg daily  Vit D : once daily    Offloading   Offload your wound. This means to reduce pressure on and around the wound that reduces blood flow to the wound and prevents healing. Your wound care team will discuss specific ways for you to offload your specific wound. Common offloading strategies include:    Turn or reposition every 2 hours or sooner  Use pillows, wedges, ROHO wheelchair cushions or other special devices like boots and shoes  to lift the wound off of hard surfaces  Alternating Low Air-loss (ALAL) mattress may be ordered  Padded dressings can reduce wound pressure          Compression: You may be using a compressive type of dressings to control edema: If so, keep your compression wrap or tubigrip in place. Do not get them wet, they should feel snug. If they feel tight, or cause pain in any way,  elevate your legs above your heart for 15 minutes. If the wraps still cause pain or you can not tolerate compression,  please remove and notify the clinic or your home health.         Call our [facility] wound clinic for questions/concerns a [phone] .

## 2025-06-06 ENCOUNTER — OFFICE VISIT (OUTPATIENT)
Dept: PRIMARY CARE CLINIC | Facility: CLINIC | Age: 60
End: 2025-06-06
Payer: COMMERCIAL

## 2025-06-06 VITALS
HEIGHT: 73 IN | HEART RATE: 97 BPM | OXYGEN SATURATION: 95 % | TEMPERATURE: 99 F | SYSTOLIC BLOOD PRESSURE: 123 MMHG | WEIGHT: 246 LBS | DIASTOLIC BLOOD PRESSURE: 79 MMHG | BODY MASS INDEX: 32.6 KG/M2

## 2025-06-06 DIAGNOSIS — R60.0 BILATERAL LOWER EXTREMITY EDEMA: ICD-10-CM

## 2025-06-06 DIAGNOSIS — L97.521 ULCER OF LEFT FOOT, LIMITED TO BREAKDOWN OF SKIN: ICD-10-CM

## 2025-06-06 DIAGNOSIS — Z09 HOSPITAL DISCHARGE FOLLOW-UP: Primary | ICD-10-CM

## 2025-06-09 ENCOUNTER — TELEPHONE (OUTPATIENT)
Dept: PRIMARY CARE CLINIC | Facility: CLINIC | Age: 60
End: 2025-06-09
Payer: COMMERCIAL

## 2025-06-09 RX ORDER — FUROSEMIDE 20 MG/1
20 TABLET ORAL DAILY
Qty: 30 TABLET | Refills: 5 | Status: SHIPPED | OUTPATIENT
Start: 2025-06-09 | End: 2025-12-06

## 2025-06-09 NOTE — TELEPHONE ENCOUNTER
Copied from CRM #[id number]. Topic: General Inquiry - Patient Advice  >> Jun 9, 2025  3:25 PM Krupa wrote:  Who Called: Kamaljit Frias    Caller is requesting assistance/information from provider's office.    Symptoms (please be specific): NA   How long has patient had these symptoms:  NA  List of preferred pharmacies on file (remove unneeded): [unfilled]  If different, enter pharmacy into here including location and phone number: NA      Preferred Method of Contact: Phone Call  Patient's Preferred Phone Number on File: [phone]   Best Call Back Number, if different:  Additional Information: medical advice, pt called to confirm status on RX for fluid discussed at appt 6/6/25, please advise, thanks

## 2025-06-23 ENCOUNTER — HOSPITAL ENCOUNTER (OUTPATIENT)
Dept: WOUND CARE | Facility: HOSPITAL | Age: 60
Discharge: HOME | End: 2025-06-23
Attending: NURSE PRACTITIONER
Payer: COMMERCIAL

## 2025-06-23 VITALS
DIASTOLIC BLOOD PRESSURE: 84 MMHG | OXYGEN SATURATION: 96 % | RESPIRATION RATE: 18 BRPM | SYSTOLIC BLOOD PRESSURE: 128 MMHG | TEMPERATURE: 98 F | HEART RATE: 91 BPM

## 2025-06-23 DIAGNOSIS — E11.621 TYPE 2 DIABETES MELLITUS WITH FOOT ULCER, WITHOUT LONG-TERM CURRENT USE OF INSULIN: ICD-10-CM

## 2025-06-23 DIAGNOSIS — I89.0 LYMPHEDEMA OF LEFT LEG: ICD-10-CM

## 2025-06-23 DIAGNOSIS — L97.509 TYPE 2 DIABETES MELLITUS WITH FOOT ULCER, WITHOUT LONG-TERM CURRENT USE OF INSULIN: ICD-10-CM

## 2025-06-23 DIAGNOSIS — S91.302A NON HEALING LEFT HEEL WOUND: Primary | ICD-10-CM

## 2025-06-23 DIAGNOSIS — Z89.412 HISTORY OF AMPUTATION OF LEFT GREAT TOE: ICD-10-CM

## 2025-06-23 DIAGNOSIS — R60.0 BILATERAL LOWER EXTREMITY EDEMA: ICD-10-CM

## 2025-06-23 PROCEDURE — 27000999 HC MEDICAL RECORD PHOTO DOCUMENTATION

## 2025-06-23 PROCEDURE — 99214 OFFICE O/P EST MOD 30 MIN: CPT | Mod: ,,, | Performed by: NURSE PRACTITIONER

## 2025-06-23 PROCEDURE — 99211 OFF/OP EST MAY X REQ PHY/QHP: CPT
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
## 2025-06-23 NOTE — PATIENT INSTRUCTIONS
Pt seen today by: Angelita Melton FNP    Self care DRESSING INSTRUCTIONS:        Wound location: Left foot    Dressings to be changed every day and as needed if soiled or not intact      Cleanse wound with half strength Dakins wound cleanser  Apply polymem max, drawtex edema to the wound bed  Cover with dry gauze and abd  Wrap with kerlix and tape      Compression with: tubigrip size G to bilateral legs    Return visit: 3 weeks    FOR ANY WORSENING CONDITION PRIOR TO NEXT APPOINTMENT REPORT TO THE NEAREST EMERGENCY ROOM    Nutrition:  The current daily value (%DV) for protein is 50 grams per day and is meant as a general goal for most people. Further increasing your dietary protein intake is very important for wound healing. Typically one needs over 100g of protein per day to help with wound healing needs.  If you are a dialysis patient or have problems with your kidneys, talk to your Nephrologist about how much protein you can take in with your condition.  Examples of high protein items that can be added to your diet include: eggs, chicken, red meats, almonds, cottage cheese, Greek yogurt, beans, and peanut butter.  Fortified protein bars, shakes and drinks can add 15-30 additional grams of protein per serving.  Also add:   1 daily general multivitamin   Vitamin C : 500mg twice daily   Zinc 220 mg daily  Vit D : once daily    Offloading   Offload your wound. This means to reduce pressure on and around the wound that reduces blood flow to the wound and prevents healing. Your wound care team will discuss specific ways for you to offload your specific wound. Common offloading strategies include:    Turn or reposition every 2 hours or sooner  Use pillows, wedges, ROHO wheelchair cushions or other special devices like boots and shoes to lift the wound off of hard surfaces  Alternating Low Air-loss (ALAL) mattress may be ordered  Padded dressings can reduce wound pressure          Compression: You may be using a  compressive type of dressings to control edema: If so, keep your compression wrap or tubigrip in place. Do not get them wet, they should feel snug. If they feel tight, or cause pain in any way,  elevate your legs above your heart for 15 minutes. If the wraps still cause pain or you can not tolerate compression,  please remove and notify the clinic or your home health.         Call our [facility] wound clinic for questions/concerns a [phone] .

## 2025-06-26 ENCOUNTER — OFFICE VISIT (OUTPATIENT)
Dept: INFECTIOUS DISEASES | Facility: CLINIC | Age: 60
End: 2025-06-26
Payer: COMMERCIAL

## 2025-06-26 VITALS
HEIGHT: 73 IN | TEMPERATURE: 98 F | HEART RATE: 90 BPM | DIASTOLIC BLOOD PRESSURE: 84 MMHG | BODY MASS INDEX: 33.4 KG/M2 | RESPIRATION RATE: 18 BRPM | OXYGEN SATURATION: 95 % | SYSTOLIC BLOOD PRESSURE: 134 MMHG | WEIGHT: 252 LBS

## 2025-06-26 DIAGNOSIS — E11.621 TYPE 2 DIABETES MELLITUS WITH FOOT ULCER, WITHOUT LONG-TERM CURRENT USE OF INSULIN: Primary | ICD-10-CM

## 2025-06-26 DIAGNOSIS — S91.302A NON HEALING LEFT HEEL WOUND: ICD-10-CM

## 2025-06-26 DIAGNOSIS — L97.509 TYPE 2 DIABETES MELLITUS WITH FOOT ULCER, WITHOUT LONG-TERM CURRENT USE OF INSULIN: Primary | ICD-10-CM

## 2025-06-26 DIAGNOSIS — Z89.412 HISTORY OF AMPUTATION OF LEFT GREAT TOE: ICD-10-CM

## 2025-06-26 DIAGNOSIS — I89.0 LYMPHEDEMA OF LEFT LEG: ICD-10-CM

## 2025-06-26 DIAGNOSIS — R60.0 BILATERAL LOWER EXTREMITY EDEMA: ICD-10-CM

## 2025-06-26 PROCEDURE — 99999 PR PBB SHADOW E&M-EST. PATIENT-LVL III: CPT | Mod: PBBFAC,,,

## 2025-06-26 RX ORDER — LISINOPRIL 10 MG/1
TABLET ORAL
COMMUNITY

## 2025-06-26 NOTE — PROGRESS NOTES
Subjective:       Patient ID: Kamaljit Frias 59 y.o.     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 Over the past year he has been living in his car and developed edema to both legs. He damaged left heel while trying to put on his boot over his swollen foot 1 year ago. He has had repeated break down of the left heel with infections on and off since, with this being the 4th episode. Patient came in on this occasion because of increased drainage and smell from the wound. On admission started on antibiotics including vancomycin and Zosyn. With that and wound care he has improved and ID is asked to recommend antibiotics in preparation for discharge home. Patient has not had fever or other constitutional symptoms.     ASSESSMENT:  Soft tissue infection to left heel with MRSA and Bordetella species isolated.  Similar organisms isolated by our lab 1 year ago and the reference lab re-identified the 2nd organism as Alcaligenes faecalis, susceptible to Bactrim.  Suspect that will be the same organism once again this time.  No associated bacteremia.  Neuropathy involving left lower extremity, chronic leg edema, predisposing the patient to nonhealing left heel ulcer     PLAN:  Replace Zosyn and vancomycin with Bactrim double strength 1 tablet twice a day x 2 weeks  Continue aggressive local wound care  Manage edema as best as possible to help with wound healing  ID clinic follow-up in 1 week with BMP to follow finalized culture report (pending from [facility]).    5/28/2025 office visit:   Mr. Frias presents for follow-up. Continues on Bactrim 1tab DS q12 hours. Denies any fever, chills, or night sweats. Denies any nausea, vomiting, or diarrhea. Continues to see podiatry.     6/26/2025:    History of Present Illness    Mr. Frias presents today for follow up of wound care. He reports ongoing wound management with sister performing wound care at home. The wound has decreased by 1 cm laterally but remains sizeable. Current wound care regimen includes cutting medicinal squares into fourths, applying to wound, then covering with gauze and feminine pads for fluid drainage absorption. He notes continued drainage but denies any infection. Next wound care appointment is scheduled in three weeks. He reports significant functional impairment due to edema. He is unable to wear most regular shoes due to swelling, with only a few fitting tightly. He has been prescribed a mechanical massaging device, described as a sock-like device with battery-operated massaging agents, intended to help reduce edema and facilitate fluid elimination through kidneys. He expresses frustration with loss of previous ankle appearance and ongoing swelling. Recent glucose reading was 117, timing uncertain relative to meals. His A1C values were 6.6 in March 2025 and 6.8 in 2022, placing him in prediabetic to diabetic range, though he reports it was approximately 5.0 one year prior. He denies taking any diabetes medications and expresses surprise at being characterized as diabetic. He is no longer taking Bactrim or other antibiotics. He has unused Cipro remaining from a previous prescription which he plans to discard upon expiration.           Past Medical History:   Diagnosis Date    Essential (primary) hypertension     Neuropathy of lower extremity         Past Surgical History:   Procedure Laterality Date    COLOSTOMY  1981    DEBRIDEMENT OF FOOT Left 03/11/2025    Procedure: DEBRIDEMENT, FOOT;  Surgeon: Get Enriquez DPM;  Location: [facility];  Service: Podiatry;  Laterality: Left;    Detachment at Left 1st Toe, Complete, Open Approach Left 03/13/2017    herniated disc repaired   2015    JOINT REPLACEMENT  Oct. 2023 April 2024     Left hip replacement and right hip replacement    left femur break       left hip replacement  2023    sciatic nerve repair  Left         Social History     Socioeconomic History    Marital status:     Number of children: 0   Occupational History    Occupation: oil    Tobacco Use    Smoking status: Former     Current packs/day: 0.00     Average packs/day: 0.2 packs/day for 28.0 years (5.6 ttl pk-yrs)     Types: Cigarettes, Cigars     Start date: 1981     Quit date: 2009     Years since quittin.4    Smokeless tobacco: Former     Types: Snuff    Tobacco comments:     Age started: 18; Age stopped: 54   Substance and Sexual Activity    Alcohol use: Yes     Comment: every 2-3 days    Drug use: Never    Sexual activity: Not Currently     Partners: Female     Birth control/protection: Condom     Social Drivers of Health     Financial Resource Strain: High Risk (2025)    Overall Financial Resource Strain (CARDIA)     Difficulty of Paying Living Expenses: Very hard   Food Insecurity: Food Insecurity Present (2025)    Hunger Vital Sign     Worried About Running Out of Food in the Last Year: Often true     Ran Out of Food in the Last Year: Often true   Transportation Needs: No Transportation Needs (2025)    PRAPARE - Transportation     Lack of Transportation (Medical): No     Lack of Transportation (Non-Medical): No   Stress: Stress Concern Present (2025)    [employer] - Occupational Stress Questionnaire     Feeling of Stress : Very much   Housing Stability: High Risk (2025)    Housing Stability Vital Sign     Unable to Pay for Housing in the Last Year: Yes     Homeless in the Last Year: Yes        Family History   Problem Relation Name Age of Onset    Hypertension Mother No history of stroke     Stroke Father Get Frias  of a stroke at the age of 44     Fibromyalgia Sister      No Known Problems Sister       "    Review of patient's allergies indicates:  No Known Allergies     Immunization History   Administered Date(s) Administered    COVID-19 Vaccine 06/01/2021, 07/01/2021    COVID-19, MRNA, LN-S, PF (MODERNA FULL 0.5 ML DOSE) 06/01/2021, 07/01/2021    Influenza (FLUBLOK) - Quadrivalent - Recombinant - PF *Preferred* (egg allergy) 11/03/2022    Influenza - Quadrivalent 10/05/2023    Influenza - Quadrivalent - PF *Preferred* (6 months and older) 12/16/2020, 10/05/2023    Influenza - Trivalent - Fluarix, Flulaval, Fluzone, Afluria - PF 08/26/2015, 12/16/2020, 10/09/2024    Influenza Whole 08/26/2015    Pneumococcal Conjugate - 13 Valent 08/26/2015    Pneumococcal Polysaccharide - 23 Valent 08/26/2015    Tdap 08/29/2019, 11/13/2019, 03/10/2025    Zoster Recombinant 10/25/2023        Review of Systems   All other systems reviewed and are negative.         Objective:      /84 (BP Location: Left arm, Patient Position: Sitting)   Pulse 90   Temp 98 °F (36.7 °C) (Oral)   Resp 18   Ht 6' 1" (1.854 m)   Wt 114.3 kg (252 lb)   SpO2 95%   BMI 33.25 kg/m²      Physical Exam  Vitals reviewed.   Constitutional:       General: He is not in acute distress.     Appearance: Normal appearance. He is not toxic-appearing.      Comments: walker   HENT:      Mouth/Throat:      Mouth: Mucous membranes are moist.      Pharynx: No oropharyngeal exudate or posterior oropharyngeal erythema.   Eyes:      General: No scleral icterus.     Conjunctiva/sclera: Conjunctivae normal.      Pupils: Pupils are equal, round, and reactive to light.   Cardiovascular:      Rate and Rhythm: Normal rate and regular rhythm.      Pulses: Normal pulses.      Heart sounds: No murmur heard.  Pulmonary:      Effort: Pulmonary effort is normal. No respiratory distress.      Breath sounds: Normal breath sounds.   Abdominal:      General: Abdomen is flat. Bowel sounds are normal.      Palpations: Abdomen is soft.      Tenderness: There is no abdominal " tenderness.   Musculoskeletal:         General: Swelling present.      Cervical back: Normal range of motion and neck supple.      Comments: Bilateral lymphedema   Left heel with open wound- evidence of good granulation   Lymphadenopathy:      Cervical: No cervical adenopathy.   Skin:     General: Skin is warm and dry.      Findings: No rash.   Neurological:      General: No focal deficit present.      Mental Status: He is alert and oriented to person, place, and time.   Psychiatric:         Mood and Affect: Mood normal.         Behavior: Behavior normal.               Is reviewed from wound care on 06/23/2025    Labs: Reviewed most recent relevant labs available, notable results highlighted in this note    Imaging: Reviewed most recent relevant imaging studies available, notable results highlighted in this note    Assessment:       Problem List Items Addressed This Visit          Endocrine    Type 2 diabetes mellitus with foot ulcer, without long-term current use of insulin - Primary       Orthopedic    Non healing left heel wound    History of amputation of left great toe       Other    Bilateral lower extremity edema    Lymphedema of left leg            Plan:   Mr. Frias is being treated for soft tissue infection to left heel with a MRSA and Bordetella species isolated.  He is on a course of piperacillin/tazobactam and vancomycin while inpatient and then transitioned to TMP/SMX 1 tab double-strength q.12 hours for a total of 2 weeks ending 5/28/2025.  - Assessed wound healing progress based on recent wound care images from [facility]: wound has shrunk 1 cm both laterally and vertically and appears to be healing without current infection.  -labs from 05/28/2025 within normal limits.  No evidence of BROOKS with concurrent Bactrim use.  -he has remained off of antimicrobials for 1 month now and continues to progress with wound healing.  Can continue to monitor off of antimicrobial treatment.  -recommend ongoing follow up  with wound care  - Discussed importance of good nutrition for wound healing, particularly lean protein intake.  - Recognized lymphedema as potential complicating factor in wound healing.  - Informed patient about potential link between cellulitis and subsequent lymphedema.  -did offer referral to lymphedema physical therapist however due to financial constraints patient is deferring at this time.  - Contact office if wound worsens or fever develops.  -no need for further follow up in ID clinic    Portions of this note dictated using EMR integrated voice recognition software, and may be subject to voice recognition errors not corrected at proofreading. Please contact writer for clarification if needed.    35 minutes of total time spent on the encounter, which includes face to face time and non-face to face time preparing to see the patient (eg, review of tests), Obtaining and/or reviewing separately obtained history, Documenting clinical information in the electronic or other health record, Independently interpreting results (not separately reported) and communicating results to the patient/family/caregiver, or Care coordination (not separately reported).

## 2025-07-04 NOTE — PROGRESS NOTES
"Follow-up       HPI:    Patient seen on 06/26/2025 for hospital discharge follow-up.  Patient has been seeing wound care for heel ulcer; sees them every 2-3 weeks. He has a lot of clear drainage.  He last saw them 06/23/2025.  Sequential compression pumps ordered.  Patient saw Infectious Disease on 06/26/2025; discharged from their care  Patient was started on Lasix 20 mg at last office visit for bilateral lower extremity edema.    Current Outpatient Medications   Medication Instructions    amLODIPine (NORVASC) 10 mg, Oral, Daily    furosemide (LASIX) 40 mg, Oral, Daily    senna-docusate 8.6-50 mg (PERICOLACE) 8.6-50 mg per tablet 1 tablet, Oral, Daily         ROS:      PE:    ..Visit Vitals  /72   Pulse 109   Temp 98.3 °F (36.8 °C)   Ht 6' 1" (1.854 m)   Wt 114.3 kg (251 lb 14.4 oz)   SpO2 95%   BMI 33.23 kg/m²        General: He is well-developed well-nourished white male in no apparent distress.  He is alert and oriented.  He appears well  Left heel:  Not examined today as patient has an appointment with wound care this a.m.  Bilateral lower extremities:  Left:  3-4+ pitting edema from feet to pretibial areas.  Right:  2+ mildly pitting edema from foot to mid pretibial region.          1. Ulcer of left foot, limited to breakdown of skin  Overview:  Patient with denuded area to left heel; margins with maceration and adjacent cellulitis.  There is foul-smelling drainage noted.  He has cellulitis extending from foot to distal calf region.  Will send to ER to be admitted.    10/09/2024: Patient presents with ulcerated areas of left heel.  He states area has been draining.  He denies any fever or chills.  Exam reveals dime-sized ulcerated area to plantar aspect of heel.    Patient advised he will need to elevate left lower extremity when he is not active.    Start Omnicef 300 mg twice daily times 14 days.    ER precautions given for worsening symptoms.    Refer to Get Enriquez, podiatrist.  He cared for " patient's last ulcer.    12/11/2024:  Patient presents for possible work release.  He saw Dr. Enriquez for his foot ulcer.  He sees him again on 12/17/2024.  He states ulcers closed in.  Exam does not reveal any drainage, tenderness or erythema.  However, in general, his heel/plantar surface of foot does not look well.  I advise patient to follow-up with his podiatrist his plan and that he will need to get work clearance from him.  ER precautions given.  I advised patient that it would be very difficult to treat this properly with him living in his car.    Assessment & Plan:  Patient continues to go to wound care; wound is slowly healing.  Last seen 06/23/2025.      2. Bilateral lower extremity edema  Overview:  Patient advised to take Lasix as needed or every other day to control edema.    07/10/2023:  He has not had any issues with lower extremity edema; he does not recall the last time he took Lasix.    04/10/2024: Patient with marked edema to left lower extremity for week secondary to living in automobile.  Patient has 3+ pitting edema from his foot to his proximal shin on examination.    04/18/2024:  Patient has lower extremity edema is much improved.  He still has 2+ pitting edema of left lower extremity secondary to not elevating extremity.  He has no evidence of cellulitis.    10/09/2024: Patient still with pitting edema to left foot, ankle and pretibial area.  Patient advised to elevate lower extremity.    Patient advised to consider compression stocking.    Limit sodium consumption.    Patient declines prescription for Lasix at this time.    Assessment & Plan:  Patient has not noticed much difference since initiating Lasix 20 mg daily.    Will increase to 40 mg daily.  Patient with lymphedema to left lower extremity; wound care has a ordered sequential compression devices.      3. Lymphedema of left leg  Overview:  07/09/2025:  Patient continues to have marked edema to left lower extremity with lesser edema  to right lower extremity.    Wound care has ordered sequential compression devices.  Will increase Lasix to 40 mg daily.  Continue to elevate lower extremities.  Refer to Kush Gregory for therapy.    Orders:  -     Ambulatory Referral/Consult to Physical Therapy; Future; Expected date: 07/09/2025    4. Essential (primary) hypertension  Overview:  Usually well controlled; elevated today x2.  Probably secondary to pain.  Patient advised to monitor blood pressures and let us know how they are doing.    Patient advised to monitor pressures and let me know how they are doing in 2 weeks.    07/10/2023: His blood pressure is well controlled; continue current medications.  Refills sent.    Limit sodium consumption.    Patient encouraged to increase physical activity, exercise and lose weight.    10/05/2023: His blood pressure is well controlled; continue current medication.    Limit sodium consumption.    Patient encouraged to lose weight.    10/09/2024:  His blood pressure is usually well controlled.  He did not take his blood pressure medication today; he is going to pharmacy to fill his prescription.  Limit sodium consumption.    Patient encouraged to lose weight.    Assessment & Plan:  His blood pressure is well controlled today; continue amlodipine.  See overview.      Other orders  -     furosemide (LASIX) 20 MG tablet; Take 2 tablets (40 mg total) by mouth once daily.  Dispense: 60 tablet; Refill: 2              ..Follow up in about 1 month (around 8/9/2025) for Follow Up.       Future Appointments   Date Time Provider Department Center   7/9/2025 11:30 AM Oren Hill DO [facility]   8/13/2025  8:30 AM [facility]

## 2025-07-09 ENCOUNTER — HOSPITAL ENCOUNTER (OUTPATIENT)
Dept: WOUND CARE | Facility: HOSPITAL | Age: 60
Discharge: HOME | End: 2025-07-09
Attending: FAMILY MEDICINE
Payer: COMMERCIAL

## 2025-07-09 ENCOUNTER — OFFICE VISIT (OUTPATIENT)
Dept: PRIMARY CARE CLINIC | Facility: CLINIC | Age: 60
End: 2025-07-09
Payer: COMMERCIAL

## 2025-07-09 VITALS
HEART RATE: 109 BPM | HEIGHT: 73 IN | BODY MASS INDEX: 33.38 KG/M2 | TEMPERATURE: 98 F | WEIGHT: 251.88 LBS | OXYGEN SATURATION: 95 % | DIASTOLIC BLOOD PRESSURE: 72 MMHG | SYSTOLIC BLOOD PRESSURE: 109 MMHG

## 2025-07-09 VITALS
OXYGEN SATURATION: 97 % | SYSTOLIC BLOOD PRESSURE: 156 MMHG | DIASTOLIC BLOOD PRESSURE: 88 MMHG | RESPIRATION RATE: 18 BRPM | HEART RATE: 96 BPM | TEMPERATURE: 98 F

## 2025-07-09 DIAGNOSIS — R60.0 BILATERAL LOWER EXTREMITY EDEMA: ICD-10-CM

## 2025-07-09 DIAGNOSIS — S81.802D WOUND OF LEFT LOWER EXTREMITY, SUBSEQUENT ENCOUNTER: Primary | ICD-10-CM

## 2025-07-09 DIAGNOSIS — I89.0 LYMPHEDEMA OF LEFT LEG: ICD-10-CM

## 2025-07-09 DIAGNOSIS — I10 ESSENTIAL (PRIMARY) HYPERTENSION: ICD-10-CM

## 2025-07-09 DIAGNOSIS — L97.521 ULCER OF LEFT FOOT, LIMITED TO BREAKDOWN OF SKIN: Primary | ICD-10-CM

## 2025-07-09 PROCEDURE — 27000999 HC MEDICAL RECORD PHOTO DOCUMENTATION

## 2025-07-09 PROCEDURE — 99211 OFF/OP EST MAY X REQ PHY/QHP: CPT

## 2025-07-09 RX ORDER — FUROSEMIDE 20 MG/1
40 TABLET ORAL DAILY
Qty: 60 TABLET | Refills: 2 | Status: SHIPPED | OUTPATIENT
Start: 2025-07-09 | End: 2026-01-05
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
## 2025-07-09 NOTE — ASSESSMENT & PLAN NOTE
Patient has not noticed much difference since initiating Lasix 20 mg daily.    Will increase to 40 mg daily.  Patient with lymphedema to left lower extremity; wound care has a ordered sequential compression devices.

## 2025-07-09 NOTE — PATIENT INSTRUCTIONS
Pt seen today by: Angelita Melton FNP    Self care DRESSING INSTRUCTIONS:        Wound location: Left foot    Dressings to be changed every day and as needed if soiled or not intact      Cleanse wound with half strength Dakins wound cleanser  Apply polymem max to the wound bed  Cover with dry gauze and abd  Wrap with kerlix and tape      Compression with: tubigrip size G to bilateral legs    Return visit: 2 weeks    FOR ANY WORSENING CONDITION PRIOR TO NEXT APPOINTMENT REPORT TO THE NEAREST EMERGENCY ROOM    Nutrition:  The current daily value (%DV) for protein is 50 grams per day and is meant as a general goal for most people. Further increasing your dietary protein intake is very important for wound healing. Typically one needs over 100g of protein per day to help with wound healing needs.  If you are a dialysis patient or have problems with your kidneys, talk to your Nephrologist about how much protein you can take in with your condition.  Examples of high protein items that can be added to your diet include: eggs, chicken, red meats, almonds, cottage cheese, Greek yogurt, beans, and peanut butter.  Fortified protein bars, shakes and drinks can add 15-30 additional grams of protein per serving.  Also add:   1 daily general multivitamin   Vitamin C : 500mg twice daily   Zinc 220 mg daily  Vit D : once daily    Offloading   Offload your wound. This means to reduce pressure on and around the wound that reduces blood flow to the wound and prevents healing. Your wound care team will discuss specific ways for you to offload your specific wound. Common offloading strategies include:    Turn or reposition every 2 hours or sooner  Use pillows, wedges, ROHO wheelchair cushions or other special devices like boots and shoes to lift the wound off of hard surfaces  Alternating Low Air-loss (ALAL) mattress may be ordered  Padded dressings can reduce wound pressure          Compression: You may be using a compressive type of  dressings to control edema: If so, keep your compression wrap or tubigrip in place. Do not get them wet, they should feel snug. If they feel tight, or cause pain in any way,  elevate your legs above your heart for 15 minutes. If the wraps still cause pain or you can not tolerate compression,  please remove and notify the clinic or your home health.         Call our [facility] wound clinic for questions/concerns a [phone] .

## 2025-07-21 ENCOUNTER — DOCUMENTATION ONLY (OUTPATIENT)
Dept: PRIMARY CARE CLINIC | Facility: CLINIC | Age: 60
End: 2025-07-21
Payer: COMMERCIAL

## 2025-07-22 ENCOUNTER — DOCUMENTATION ONLY (OUTPATIENT)
Dept: PRIMARY CARE CLINIC | Facility: CLINIC | Age: 60
End: 2025-07-22
Payer: COMMERCIAL

## 2025-07-22 ENCOUNTER — TELEPHONE (OUTPATIENT)
Dept: PRIMARY CARE CLINIC | Facility: CLINIC | Age: 60
End: 2025-07-22
Payer: COMMERCIAL

## 2025-07-22 NOTE — TELEPHONE ENCOUNTER
Copied from CRM #[id number]. Topic: General Inquiry - Information Request  >> Jul 22, 2025  1:59 PM Emory wrote:  .Who Called: Vanessa with [facility]    Caller is requesting assistance/information from provider's office.    Symptoms (please be specific): N/A   How long has patient had these symptoms:  N/A  List of preferred pharmacies on file (remove unneeded): [unfilled]  If different, enter pharmacy into here including location and phone number: N/A      Preferred Method of Contact: Phone Call    Patient's Preferred Phone Number on File: [phone]     Best Call Back Number, if different: [phone]    Additional Information: Vanessa states they need insurance information and demographics fax to [phone]. Please advise, thank you

## 2025-07-23 ENCOUNTER — HOSPITAL ENCOUNTER (OUTPATIENT)
Dept: WOUND CARE | Facility: HOSPITAL | Age: 60
Discharge: HOME | End: 2025-07-23
Attending: FAMILY MEDICINE
Payer: COMMERCIAL

## 2025-07-23 VITALS
OXYGEN SATURATION: 96 % | RESPIRATION RATE: 18 BRPM | TEMPERATURE: 98 F | HEART RATE: 96 BPM | DIASTOLIC BLOOD PRESSURE: 79 MMHG | SYSTOLIC BLOOD PRESSURE: 125 MMHG

## 2025-07-23 DIAGNOSIS — S91.302A NON HEALING LEFT HEEL WOUND: Primary | ICD-10-CM

## 2025-07-23 PROCEDURE — 99211 OFF/OP EST MAY X REQ PHY/QHP: CPT

## 2025-07-23 PROCEDURE — 27000999 HC MEDICAL RECORD PHOTO DOCUMENTATION

## 2025-07-23 NOTE — PATIENT INSTRUCTIONS
Pt seen today by: Dr EVELYNE Hill    Self care DRESSING INSTRUCTIONS:        Wound location: Left heel    Dressings to be changed every day and as needed if soiled or not intact      Cleanse wound with half strength Dakins wound cleanser  Apply Fibracol plus  Apply polymem max to the wound bed  Cover with dry gauze and abd  Wrap with kerlix and tape      Compression with: tubigrip size G to bilateral legs    Return visit: 2 weeks    FOR ANY WORSENING CONDITION PRIOR TO NEXT APPOINTMENT REPORT TO THE NEAREST EMERGENCY ROOM    Nutrition:  The current daily value (%DV) for protein is 50 grams per day and is meant as a general goal for most people. Further increasing your dietary protein intake is very important for wound healing. Typically one needs over 100g of protein per day to help with wound healing needs.  If you are a dialysis patient or have problems with your kidneys, talk to your Nephrologist about how much protein you can take in with your condition.  Examples of high protein items that can be added to your diet include: eggs, chicken, red meats, almonds, cottage cheese, Greek yogurt, beans, and peanut butter.  Fortified protein bars, shakes and drinks can add 15-30 additional grams of protein per serving.  Also add:   1 daily general multivitamin   Vitamin C : 500mg twice daily   Zinc 220 mg daily  Vit D : once daily    Offloading   Offload your wound. This means to reduce pressure on and around the wound that reduces blood flow to the wound and prevents healing. Your wound care team will discuss specific ways for you to offload your specific wound. Common offloading strategies include:    Turn or reposition every 2 hours or sooner  Use pillows, wedges, ROHO wheelchair cushions or other special devices like boots and shoes to lift the wound off of hard surfaces  Alternating Low Air-loss (ALAL) mattress may be ordered  Padded dressings can reduce wound pressure          Compression: You may be using a  compressive type of dressings to control edema: If so, keep your compression wrap or tubigrip in place. Do not get them wet, they should feel snug. If they feel tight, or cause pain in any way,  elevate your legs above your heart for 15 minutes. If the wraps still cause pain or you can not tolerate compression,  please remove and notify the clinic or your home health.         Call our [facility] wound clinic for questions/concerns a [phone] .

## 2025-07-23 NOTE — PROGRESS NOTES
CHIEF COMPLAINT:  No chief complaint on file.    HISTORY OF  PRESENT ILLNESS:  59 y.o. White male being seen today for left heel wound.  Patient was admitted on 03/09/25 and was discharged 5 days later.  He had I and D done on 03/11 by podiatrist at [facility].  He was on broad-spectrum antibiotics and was given Augmentin at discharge.  His only other medical history is high blood pressure.  Of note, patient is homeless and has limited resources.  He has been cleaning the area with water and covering.    Today's information:  Patient here for follow-up left heel wound.  He has been cleaning with Vashe, applying PolyMem max and covering with foam bandage.  He has been using compression stockings at home and reports less lower extremity swelling.  His Lasix dose was also increased.  He is able to do his own wound care.    REVIEW OF SYSTEMS:  Review of Systems   Constitutional:  Negative for chills and fever.   Respiratory:  Negative for cough.    Gastrointestinal:  Negative for nausea.   Musculoskeletal:         As stated in HPI   Skin:         As stated in HPI   Psychiatric/Behavioral: Negative.         Past Medical History:   Diagnosis Date    Essential (primary) hypertension     Neuropathy of lower extremity       Past Surgical History:   Procedure Laterality Date    COLOSTOMY  1981    DEBRIDEMENT OF FOOT Left 03/11/2025    Procedure: DEBRIDEMENT, FOOT;  Surgeon: Get Enriquez DPM;  Location: [facility];  Service: Podiatry;  Laterality: Left;    Detachment at Left 1st Toe, Complete, Open Approach Left 03/13/2017    herniated disc repaired   2015    JOINT REPLACEMENT  Oct. 2023 April 2024    Left hip replacement and right hip replacement    left femur break   2008    left hip replacement  02/02/2023    sciatic nerve repair  Left 1980      Social History     Socioeconomic History    Marital status:     Number of children: 0   Occupational History    Occupation: oil    Tobacco Use     Smoking status: Former     Current packs/day: 0.00     Average packs/day: 0.2 packs/day for 28.0 years (5.6 ttl pk-yrs)     Types: Cigarettes, Cigars     Start date: 1981     Quit date: 2009     Years since quittin.5    Smokeless tobacco: Former     Types: Snuff    Tobacco comments:     Age started: 18; Age stopped: 54   Substance and Sexual Activity    Alcohol use: Yes     Comment: every 2-3 days    Drug use: Never    Sexual activity: Not Currently     Partners: Female     Birth control/protection: Condom     Social Drivers of Health     Financial Resource Strain: High Risk (2025)    Overall Financial Resource Strain (CARDIA)     Difficulty of Paying Living Expenses: Very hard   Food Insecurity: Food Insecurity Present (2025)    Hunger Vital Sign     Worried About Running Out of Food in the Last Year: Often true     Ran Out of Food in the Last Year: Often true   Transportation Needs: No Transportation Needs (2025)    PRAPARE - Transportation     Lack of Transportation (Medical): No     Lack of Transportation (Non-Medical): No   Stress: Stress Concern Present (2025)    [employer] - Occupational Stress Questionnaire     Feeling of Stress : Very much   Housing Stability: High Risk (2025)    Housing Stability Vital Sign     Unable to Pay for Housing in the Last Year: Yes     Homeless in the Last Year: Yes       Review of Most Recent Wound Care-Related Labs:  Lab Results   Component Value Date/Time    WBC 9.30 2025 12:17 PM    RBC 4.16 (L) 2025 12:17 PM    HGB 13.1 (L) 2025 12:17 PM    HGB 12.9 (L) 10/17/2023 10:44 AM    HCT 40.1 (L) 2025 12:17 PM    HCT 38.4 (L) 10/17/2023 10:44 AM    MCV 96.4 (H) 2025 12:17 PM    MCH 31.5 (H) 2025 12:17 PM    CREATININE 1.17 2025 12:17 PM    CREATININE 1.31 (H) 10/17/2023 10:44 AM    HGBA1C 6.6 03/10/2025 03:44 AM    HGBA1C 5.8 2023 11:12 AM    CRP 9.30 (H) 2025  12:17 PM        Wound-Related Imaging:      PHYSICAL EXAMINATION:  Blood pressure 125/79, pulse 96, temperature 97.9 °F (36.6 °C), temperature source Oral, resp. rate 18, SpO2 96%.  General:  No acute distress.  Talkative  Respiratory: Non labored.  No coughing.  Cardiovascular:  + lower extremity swelling on the left- improved since previous visit  Musculoskeletal:  No joint deformities  Neurologic:  A&O X 3.    Psychiatric:  Calm, cooperative.  Mood and effect normal  Integumentary:  Left heel wound-smaller in size compared to previous visit, 100% granulation tissue    ASSESSMENT/PLAN:    Patient Active Problem List    Diagnosis Date Noted    Lymphedema of left leg 06/23/2025    History of amputation of left great toe 06/23/2025    Type 2 diabetes mellitus with foot ulcer, without long-term current use of insulin 06/23/2025    Non healing left heel wound 05/11/2025    Left foot infection 03/10/2025    BROOKS (acute kidney injury) 03/10/2025    Blister of left heel with infection 04/15/2024    Ulcer of left foot 04/10/2024    Cellulitis of left lower extremity 04/10/2024    Primary osteoarthritis of right hip 10/03/2023    Bilateral lower extremity edema 04/10/2023    Hypercalcemia 04/10/2023    History of total hip replacement, left 02/14/2023    Hospital discharge follow-up 02/12/2023    Primary osteoarthritis of both hips 01/09/2023    Hyperglycemia 01/09/2023    Pre-op examination 01/08/2023    Immunization due 11/03/2022    Neuropathy of lower extremity     Chronic midline low back pain without sciatica 07/15/2022    Essential (primary) hypertension      Left heel wound-clean with Dakin's, apply Fibracol, polymem max, then secondary dressing.  He needs to change bandage daily.  Tubigrip given to help with swelling, lower extremity swelling is better, continue elevating and staying off of the foot as much as possible  Return to clinic in 2-3 weeks

## 2025-07-24 ENCOUNTER — TELEPHONE (OUTPATIENT)
Dept: PRIMARY CARE CLINIC | Facility: CLINIC | Age: 60
End: 2025-07-24
Payer: COMMERCIAL

## 2025-07-24 NOTE — TELEPHONE ENCOUNTER
[facility] is unable to see patient due to patient not having out of network benefits. Please send referral to a different provider.

## 2025-08-08 ENCOUNTER — HOSPITAL ENCOUNTER (OUTPATIENT)
Dept: WOUND CARE | Facility: HOSPITAL | Age: 60
Discharge: HOME | End: 2025-08-08
Attending: FAMILY MEDICINE
Payer: COMMERCIAL

## 2025-08-08 VITALS
DIASTOLIC BLOOD PRESSURE: 82 MMHG | SYSTOLIC BLOOD PRESSURE: 119 MMHG | RESPIRATION RATE: 18 BRPM | TEMPERATURE: 98 F | OXYGEN SATURATION: 95 % | HEART RATE: 95 BPM

## 2025-08-08 DIAGNOSIS — S91.302A NON HEALING LEFT HEEL WOUND: Primary | ICD-10-CM

## 2025-08-08 PROCEDURE — 11055 PARING/CUTG B9 HYPRKER LES 1: CPT

## 2025-08-08 PROCEDURE — 99211 OFF/OP EST MAY X REQ PHY/QHP: CPT | Mod: 25

## 2025-08-08 PROCEDURE — 27000999 HC MEDICAL RECORD PHOTO DOCUMENTATION

## 2025-08-08 NOTE — PATIENT INSTRUCTIONS
Pt seen today by: Dr EVELYNE Hill    Self care DRESSING INSTRUCTIONS:        Wound location: Left heel    Dressings to be changed every day and as needed if soiled or not intact      Cleanse wound with half strength Dakins wound cleanser  Apply polymem silver to wound bed with grid facing up/away from wound  Cover with drawtex edema x 2  Wrap with kerlix and tape      Compression with: tubigrip size G to bilateral legs    Return visit: 2 weeks    FOR ANY WORSENING CONDITION PRIOR TO NEXT APPOINTMENT REPORT TO THE NEAREST EMERGENCY ROOM    Nutrition:  The current daily value (%DV) for protein is 50 grams per day and is meant as a general goal for most people. Further increasing your dietary protein intake is very important for wound healing. Typically one needs over 100g of protein per day to help with wound healing needs.  If you are a dialysis patient or have problems with your kidneys, talk to your Nephrologist about how much protein you can take in with your condition.  Examples of high protein items that can be added to your diet include: eggs, chicken, red meats, almonds, cottage cheese, Greek yogurt, beans, and peanut butter.  Fortified protein bars, shakes and drinks can add 15-30 additional grams of protein per serving.  Also add:   1 daily general multivitamin   Vitamin C : 500mg twice daily   Zinc 220 mg daily  Vit D : once daily    Offloading   Offload your wound. This means to reduce pressure on and around the wound that reduces blood flow to the wound and prevents healing. Your wound care team will discuss specific ways for you to offload your specific wound. Common offloading strategies include:    Turn or reposition every 2 hours or sooner  Use pillows, wedges, ROHO wheelchair cushions or other special devices like boots and shoes to lift the wound off of hard surfaces  Alternating Low Air-loss (ALAL) mattress may be ordered  Padded dressings can reduce wound pressure          Compression: You may be  using a compressive type of dressings to control edema: If so, keep your compression wrap or tubigrip in place. Do not get them wet, they should feel snug. If they feel tight, or cause pain in any way,  elevate your legs above your heart for 15 minutes. If the wraps still cause pain or you can not tolerate compression,  please remove and notify the clinic or your home health.         Call our [facility] wound clinic for questions/concerns a [phone] .

## 2025-08-08 NOTE — PROGRESS NOTES
CHIEF COMPLAINT:  No chief complaint on file.    HISTORY OF  PRESENT ILLNESS:  59 y.o. White male being seen today for left heel wound.  Patient was admitted on 03/09/25 and was discharged 5 days later.  He had I and D done on 03/11 by podiatrist at [facility].  He was on broad-spectrum antibiotics and was given Augmentin at discharge.  His only other medical history is high blood pressure.  Of note, patient is homeless and has limited resources.  He has been cleaning the area with water and covering.    Today's information:  Patient here for follow-up left heel wound.  He has been cleaning with Vashe, applying fibracol, PolyMem max, and covering with foam bandage.  He has been using compression stockings at home and reports less lower extremity swelling. He uses lymphedema pumps daily.  He reports bandage has been soaked more than usual over the last week.    REVIEW OF SYSTEMS:  Review of Systems   Constitutional:  Negative for chills and fever.   Respiratory:  Negative for cough.    Gastrointestinal:  Negative for nausea.   Musculoskeletal:         As stated in HPI   Skin:         As stated in HPI   Psychiatric/Behavioral: Negative.         Past Medical History:   Diagnosis Date    Essential (primary) hypertension     Neuropathy of lower extremity       Past Surgical History:   Procedure Laterality Date    COLOSTOMY  1981    DEBRIDEMENT OF FOOT Left 03/11/2025    Procedure: DEBRIDEMENT, FOOT;  Surgeon: Get Enriquez DPM;  Location: [facility];  Service: Podiatry;  Laterality: Left;    Detachment at Left 1st Toe, Complete, Open Approach Left 03/13/2017    herniated disc repaired   2015    JOINT REPLACEMENT  Oct. 2023 April 2024    Left hip replacement and right hip replacement    left femur break   2008    left hip replacement  02/02/2023    sciatic nerve repair  Left 1980      Social History     Socioeconomic History    Marital status:     Number of children: 0   Occupational History     Occupation: oil    Tobacco Use    Smoking status: Former     Current packs/day: 0.00     Average packs/day: 0.2 packs/day for 28.0 years (5.6 ttl pk-yrs)     Types: Cigarettes, Cigars     Start date: 1981     Quit date: 2009     Years since quittin.6    Smokeless tobacco: Former     Types: Snuff    Tobacco comments:     Age started: 18; Age stopped: 54   Substance and Sexual Activity    Alcohol use: Yes     Comment: every 2-3 days    Drug use: Never    Sexual activity: Not Currently     Partners: Female     Birth control/protection: Condom     Social Drivers of Health     Financial Resource Strain: High Risk (2025)    Overall Financial Resource Strain (CARDIA)     Difficulty of Paying Living Expenses: Very hard   Food Insecurity: Food Insecurity Present (2025)    Hunger Vital Sign     Worried About Running Out of Food in the Last Year: Often true     Ran Out of Food in the Last Year: Often true   Transportation Needs: No Transportation Needs (2025)    PRAPARE - Transportation     Lack of Transportation (Medical): No     Lack of Transportation (Non-Medical): No   Stress: Stress Concern Present (2025)    [employer] - Occupational Stress Questionnaire     Feeling of Stress : Very much   Housing Stability: High Risk (2025)    Housing Stability Vital Sign     Unable to Pay for Housing in the Last Year: Yes     Homeless in the Last Year: Yes       Review of Most Recent Wound Care-Related Labs:  Lab Results   Component Value Date/Time    WBC 9.30 2025 12:17 PM    RBC 4.16 (L) 2025 12:17 PM    HGB 13.1 (L) 2025 12:17 PM    HGB 12.9 (L) 10/17/2023 10:44 AM    HCT 40.1 (L) 2025 12:17 PM    HCT 38.4 (L) 10/17/2023 10:44 AM    MCV 96.4 (H) 2025 12:17 PM    MCH 31.5 (H) 2025 12:17 PM    CREATININE 1.17 2025 12:17 PM    CREATININE 1.31 (H) 10/17/2023 10:44 AM    HGBA1C 6.6 03/10/2025 03:44 AM    HGBA1C 5.8  09/19/2023 11:12 AM    CRP 9.30 (H) 05/28/2025 12:17 PM        Wound-Related Imaging:          PHYSICAL EXAMINATION:  Blood pressure 119/82, pulse 95, temperature 97.6 °F (36.4 °C), temperature source Oral, resp. rate 18, SpO2 95%.  General:  No acute distress  Respiratory: Non labored.  No coughing.  Cardiovascular:  LE swelling on the left   Musculoskeletal:  No joint deformities  Neurologic:  A&O X 3.    Psychiatric:  Calm, cooperative.  Mood and effect normal  Integumentary:  Left heel wound-maceration of periwound - callous pared down with curette    ASSESSMENT/PLAN:    Patient Active Problem List    Diagnosis Date Noted    Lymphedema of left leg 06/23/2025    History of amputation of left great toe 06/23/2025    Type 2 diabetes mellitus with foot ulcer, without long-term current use of insulin 06/23/2025    Non healing left heel wound 05/11/2025    Left foot infection 03/10/2025    BROOKS (acute kidney injury) 03/10/2025    Blister of left heel with infection 04/15/2024    Ulcer of left foot 04/10/2024    Cellulitis of left lower extremity 04/10/2024    Primary osteoarthritis of right hip 10/03/2023    Bilateral lower extremity edema 04/10/2023    Hypercalcemia 04/10/2023    History of total hip replacement, left 02/14/2023    Hospital discharge follow-up 02/12/2023    Primary osteoarthritis of both hips 01/09/2023    Hyperglycemia 01/09/2023    Pre-op examination 01/08/2023    Immunization due 11/03/2022    Neuropathy of lower extremity     Chronic midline low back pain without sciatica 07/15/2022    Essential (primary) hypertension      Left heel wound-clean with Dakin's, apply silver polymem, drawtex, then secondary dressing.  He needs to change bandage daily.  Cont Tubigrip, cont lymphedema pumps, wound is stay wetter than usual, discussed if bandage is full of fluid, to change twice a day.  Return to clinic in 2-3 weeks

## 2025-08-11 DIAGNOSIS — L97.509 TYPE 2 DIABETES MELLITUS WITH FOOT ULCER, WITHOUT LONG-TERM CURRENT USE OF INSULIN: Primary | ICD-10-CM

## 2025-08-11 DIAGNOSIS — E11.621 TYPE 2 DIABETES MELLITUS WITH FOOT ULCER, WITHOUT LONG-TERM CURRENT USE OF INSULIN: Primary | ICD-10-CM

## 2025-08-13 ENCOUNTER — OFFICE VISIT (OUTPATIENT)
Dept: PRIMARY CARE CLINIC | Facility: CLINIC | Age: 60
End: 2025-08-13
Payer: COMMERCIAL

## 2025-08-13 ENCOUNTER — TELEPHONE (OUTPATIENT)
Dept: PRIMARY CARE CLINIC | Facility: CLINIC | Age: 60
End: 2025-08-13

## 2025-08-13 VITALS
SYSTOLIC BLOOD PRESSURE: 121 MMHG | TEMPERATURE: 98 F | DIASTOLIC BLOOD PRESSURE: 75 MMHG | OXYGEN SATURATION: 96 % | BODY MASS INDEX: 33 KG/M2 | HEIGHT: 73 IN | WEIGHT: 249 LBS | HEART RATE: 94 BPM

## 2025-08-13 DIAGNOSIS — I89.0 LYMPHEDEMA OF LEFT LEG: ICD-10-CM

## 2025-08-13 DIAGNOSIS — G89.29 CHRONIC MIDLINE LOW BACK PAIN WITHOUT SCIATICA: ICD-10-CM

## 2025-08-13 DIAGNOSIS — K02.9 DENTAL CARIES: ICD-10-CM

## 2025-08-13 DIAGNOSIS — M54.50 CHRONIC MIDLINE LOW BACK PAIN WITHOUT SCIATICA: ICD-10-CM

## 2025-08-13 DIAGNOSIS — R60.0 BILATERAL LOWER EXTREMITY EDEMA: ICD-10-CM

## 2025-08-13 DIAGNOSIS — L97.521 ULCER OF LEFT FOOT, LIMITED TO BREAKDOWN OF SKIN: Primary | ICD-10-CM

## 2025-08-13 RX ORDER — TRAMADOL HYDROCHLORIDE 50 MG/1
50 TABLET, FILM COATED ORAL EVERY 8 HOURS PRN
Qty: 30 TABLET | Refills: 1 | Status: SHIPPED | OUTPATIENT
Start: 2025-08-13

## 2025-08-14 RX ORDER — AMOXICILLIN 875 MG/1
875 TABLET, COATED ORAL EVERY 12 HOURS
Qty: 14 TABLET | Refills: 0 | Status: SHIPPED | OUTPATIENT
Start: 2025-08-14 | End: 2025-08-21

## 2025-08-22 ENCOUNTER — HOSPITAL ENCOUNTER (OUTPATIENT)
Dept: WOUND CARE | Facility: HOSPITAL | Age: 60
Discharge: HOME | End: 2025-08-22
Attending: FAMILY MEDICINE
Payer: COMMERCIAL

## 2025-08-22 VITALS
DIASTOLIC BLOOD PRESSURE: 84 MMHG | SYSTOLIC BLOOD PRESSURE: 137 MMHG | RESPIRATION RATE: 19 BRPM | TEMPERATURE: 97 F | OXYGEN SATURATION: 96 % | HEART RATE: 97 BPM

## 2025-08-22 DIAGNOSIS — S81.802D WOUND OF LEFT LOWER EXTREMITY, SUBSEQUENT ENCOUNTER: Primary | ICD-10-CM

## 2025-08-22 PROCEDURE — 27000999 HC MEDICAL RECORD PHOTO DOCUMENTATION

## 2025-08-22 PROCEDURE — 99211 OFF/OP EST MAY X REQ PHY/QHP: CPT

## 2025-08-28 ENCOUNTER — PATIENT OUTREACH (OUTPATIENT)
Facility: CLINIC | Age: 60
End: 2025-08-28
Payer: COMMERCIAL

## 2025-08-28 DIAGNOSIS — L97.509 TYPE 2 DIABETES MELLITUS WITH FOOT ULCER, WITHOUT LONG-TERM CURRENT USE OF INSULIN: Primary | ICD-10-CM

## 2025-08-28 DIAGNOSIS — E11.621 TYPE 2 DIABETES MELLITUS WITH FOOT ULCER, WITHOUT LONG-TERM CURRENT USE OF INSULIN: Primary | ICD-10-CM

## 2025-09-05 ENCOUNTER — HOSPITAL ENCOUNTER (OUTPATIENT)
Dept: WOUND CARE | Facility: HOSPITAL | Age: 60
Discharge: HOME | End: 2025-09-05
Attending: FAMILY MEDICINE
Payer: COMMERCIAL

## 2025-09-05 VITALS
OXYGEN SATURATION: 95 % | DIASTOLIC BLOOD PRESSURE: 90 MMHG | SYSTOLIC BLOOD PRESSURE: 154 MMHG | HEART RATE: 93 BPM | TEMPERATURE: 98 F

## 2025-09-05 DIAGNOSIS — S81.802D WOUND OF LEFT LOWER EXTREMITY, SUBSEQUENT ENCOUNTER: Primary | ICD-10-CM

## 2025-09-05 PROBLEM — L08.9 WOUND INFECTION: Status: ACTIVE | Noted: 2025-09-05

## 2025-09-05 PROBLEM — T14.8XXA WOUND INFECTION: Status: ACTIVE | Noted: 2025-09-05

## 2025-09-05 PROCEDURE — 99211 OFF/OP EST MAY X REQ PHY/QHP: CPT | Mod: 25

## 2025-09-05 PROCEDURE — 27000999 HC MEDICAL RECORD PHOTO DOCUMENTATION

## (undated) DEVICE — TRAY SKIN SCRUB WET PREMIUM

## (undated) DEVICE — NDL HYPO 22GX1 1/2 SYR 10ML LL

## (undated) DEVICE — SOL NACL IRR 1000ML BTL

## (undated) DEVICE — NDL HYPO REG 25G X 1 1/2

## (undated) DEVICE — KIT SURGICAL TURNOVER

## (undated) DEVICE — GLOVE PROTEXIS HYDROGEL SZ7.5

## (undated) DEVICE — BLADE SURG STAINLESS STEEL #15

## (undated) DEVICE — Device

## (undated) DEVICE — BANDAGE GAUZE COT STRL 4.5X4.1

## (undated) DEVICE — BANDAGE ESMARK 4INX3YD

## (undated) DEVICE — DRAPE EXTREMITY HVY DTY REINF

## (undated) DEVICE — SPONGE KERLIX ANTIMIC 6X6.75IN

## (undated) DEVICE — BANDAGE VELCLOSE ELAS 4INX5YD

## (undated) DEVICE — SUT ETHILON 4-0 PS4 18 BLK

## (undated) DEVICE — ELECTRODE PATIENT RETURN DISP